# Patient Record
Sex: FEMALE | Race: WHITE | NOT HISPANIC OR LATINO | Employment: OTHER | URBAN - METROPOLITAN AREA
[De-identification: names, ages, dates, MRNs, and addresses within clinical notes are randomized per-mention and may not be internally consistent; named-entity substitution may affect disease eponyms.]

---

## 2017-01-04 ENCOUNTER — TELEPHONE (OUTPATIENT)
Dept: BARIATRICS | Facility: CLINIC | Age: 72
End: 2017-01-04

## 2017-01-04 ENCOUNTER — PATIENT MESSAGE (OUTPATIENT)
Dept: BARIATRICS | Facility: CLINIC | Age: 72
End: 2017-01-04

## 2017-01-04 NOTE — TELEPHONE ENCOUNTER
Pt called back and states she has a hiatal hernia.  Says she would like to have her sleeve sx done on the [facility].      Pt opted to do online finn. She requested instructions be sent via my [facility].

## 2017-01-04 NOTE — TELEPHONE ENCOUNTER
----- Message from Francokiera Edmond sent at 1/4/2017  9:27 AM CST -----  [phone]/pt states that she needs to speak with someone in ref to seeing if she can have her hernia repaired at the same time as a sleeve procedure/please call//thank you

## 2017-01-04 NOTE — TELEPHONE ENCOUNTER
Called pt back but no answer.  LVM stating she can have hernia taken out depending on what kind of hernia she has and if she can wait for the sleeve to have it repaired at once. Advised if she is having severe pain, she must take out first then sleeve later.  Requested she call me back to discuss what kind of hernia she has.

## 2017-01-13 ENCOUNTER — LAB VISIT (OUTPATIENT)
Dept: LAB | Facility: HOSPITAL | Age: 72
End: 2017-01-13
Attending: INTERNAL MEDICINE
Payer: MEDICARE

## 2017-01-13 ENCOUNTER — OFFICE VISIT (OUTPATIENT)
Dept: FAMILY MEDICINE | Facility: CLINIC | Age: 72
End: 2017-01-13
Payer: MEDICARE

## 2017-01-13 ENCOUNTER — TELEPHONE (OUTPATIENT)
Dept: FAMILY MEDICINE | Facility: CLINIC | Age: 72
End: 2017-01-13

## 2017-01-13 VITALS
SYSTOLIC BLOOD PRESSURE: 160 MMHG | WEIGHT: 247.94 LBS | TEMPERATURE: 98 F | BODY MASS INDEX: 37.58 KG/M2 | DIASTOLIC BLOOD PRESSURE: 90 MMHG | HEART RATE: 86 BPM | HEIGHT: 68 IN | RESPIRATION RATE: 17 BRPM | OXYGEN SATURATION: 97 %

## 2017-01-13 DIAGNOSIS — M27.69 FAILING DENTAL IMPLANT: ICD-10-CM

## 2017-01-13 DIAGNOSIS — N18.30 CKD (CHRONIC KIDNEY DISEASE), STAGE III: ICD-10-CM

## 2017-01-13 DIAGNOSIS — E66.9 OBESITY (BMI 30-39.9): ICD-10-CM

## 2017-01-13 DIAGNOSIS — N25.81 SECONDARY HYPERPARATHYROIDISM: ICD-10-CM

## 2017-01-13 DIAGNOSIS — I10 ESSENTIAL HYPERTENSION: Primary | ICD-10-CM

## 2017-01-13 DIAGNOSIS — Z23 NEED FOR INFLUENZA VACCINATION: ICD-10-CM

## 2017-01-13 DIAGNOSIS — M27.69 FAILING DENTAL IMPLANT: Primary | ICD-10-CM

## 2017-01-13 DIAGNOSIS — Z13.6 SCREENING FOR CARDIOVASCULAR CONDITION: ICD-10-CM

## 2017-01-13 LAB
25(OH)D3+25(OH)D2 SERPL-MCNC: 15 NG/ML
ALBUMIN SERPL BCP-MCNC: 3.6 G/DL
ANION GAP SERPL CALC-SCNC: 8 MMOL/L
BUN SERPL-MCNC: 16 MG/DL
CALCIUM SERPL-MCNC: 9.1 MG/DL
CHLORIDE SERPL-SCNC: 109 MMOL/L
CHOLEST/HDLC SERPL: 5.1 {RATIO}
CO2 SERPL-SCNC: 23 MMOL/L
CREAT SERPL-MCNC: 1.2 MG/DL
EST. GFR  (AFRICAN AMERICAN): 52.5 ML/MIN/1.73 M^2
EST. GFR  (NON AFRICAN AMERICAN): 45.6 ML/MIN/1.73 M^2
GLUCOSE SERPL-MCNC: 90 MG/DL
HDL/CHOLESTEROL RATIO: 19.5 %
HDLC SERPL-MCNC: 159 MG/DL
HDLC SERPL-MCNC: 31 MG/DL
LDLC SERPL CALC-MCNC: 105.4 MG/DL
NONHDLC SERPL-MCNC: 128 MG/DL
PHOSPHATE SERPL-MCNC: 3.7 MG/DL
POTASSIUM SERPL-SCNC: 4.6 MMOL/L
PTH-INTACT SERPL-MCNC: 160 PG/ML
SODIUM SERPL-SCNC: 140 MMOL/L
TRIGL SERPL-MCNC: 113 MG/DL

## 2017-01-13 PROCEDURE — 99999 PR PBB SHADOW E&M-EST. PATIENT-LVL III: CPT | Mod: PBBFAC,,, | Performed by: FAMILY MEDICINE

## 2017-01-13 PROCEDURE — 83970 ASSAY OF PARATHORMONE: CPT

## 2017-01-13 PROCEDURE — 36415 COLL VENOUS BLD VENIPUNCTURE: CPT | Mod: PO

## 2017-01-13 PROCEDURE — 99214 OFFICE O/P EST MOD 30 MIN: CPT | Mod: S$PBB,,, | Performed by: FAMILY MEDICINE

## 2017-01-13 PROCEDURE — 82306 VITAMIN D 25 HYDROXY: CPT

## 2017-01-13 PROCEDURE — 80061 LIPID PANEL: CPT

## 2017-01-13 PROCEDURE — 80069 RENAL FUNCTION PANEL: CPT

## 2017-01-13 NOTE — TELEPHONE ENCOUNTER
----- Message from Ayana Cervantes sent at 1/13/2017 11:41 AM CST -----  Pt requesting new referral to dr Marco Smith [address] [place] [address] [address] [place] [address], [place] Tel# [phone] Fax [phone]. Pt states dr hancock is no longer on [facility]. Please fax referral to his office.

## 2017-01-13 NOTE — PROGRESS NOTES
Chief Complaint   Patient presents with    Anxiety     1 month follow up , labs results discuss     Hypertension       Luisana Chun is a 71 y.o. female who presents per the Chief Complaint.  Pt is known to me and was last seen by me on 12/15/2016.  All known chronic medical issues have been documented.       Hypertension   This is a chronic problem. The current episode started more than 1 year ago. The problem is unchanged. The problem is uncontrolled. Associated symptoms include anxiety. Pertinent negatives include no blurred vision, chest pain, headaches, malaise/fatigue, neck pain, orthopnea, palpitations, peripheral edema, PND, shortness of breath or sweats. There are no associated agents to hypertension. Risk factors for coronary artery disease include obesity, post-menopausal state and sedentary lifestyle. Past treatments include angiotensin blockers and diuretics. The current treatment provides moderate improvement. Compliance problems include diet and exercise.  Hypertensive end-organ damage includes kidney disease. There is no history of angina, CAD/MI, CVA, heart failure, left ventricular hypertrophy, PVD, renovascular disease, retinopathy or a thyroid problem. Identifiable causes of hypertension include chronic renal disease. There is no history of coarctation of the aorta, hyperaldosteronism, hypercortisolism, hyperparathyroidism, a hypertension causing med, pheochromocytoma or sleep apnea.        ROS  Review of Systems   Constitutional: Negative.  Negative for activity change, appetite change, chills, diaphoresis, fatigue, fever, malaise/fatigue and unexpected weight change.   HENT: Negative.  Negative for congestion, ear pain, hearing loss, nosebleeds, postnasal drip, rhinorrhea, sinus pressure, sneezing, sore throat and trouble swallowing.    Eyes: Negative for blurred vision, pain and visual disturbance.   Respiratory: Negative for cough, choking and shortness of breath.    Cardiovascular:  "Negative for chest pain, palpitations, orthopnea, leg swelling and PND.   Gastrointestinal: Negative for abdominal pain, constipation, diarrhea, nausea and vomiting.   Genitourinary: Negative for difficulty urinating, dysuria, frequency and urgency.   Musculoskeletal: Positive for arthralgias (right shoulder) and myalgias. Negative for back pain, gait problem, joint swelling, neck pain and neck stiffness.   Skin: Negative.    Allergic/Immunologic: Negative for environmental allergies and food allergies.   Neurological: Negative.  Negative for dizziness, seizures, syncope, weakness, light-headedness and headaches.   Psychiatric/Behavioral: Negative.  Negative for confusion, decreased concentration, dysphoric mood and sleep disturbance. The patient is not nervous/anxious.        Physical Exam  Vitals:    01/13/17 0959   BP: (!) 160/90   Pulse: 86   Resp: 17   Temp: 98 °F (36.7 °C)    Body mass index is 37.69 kg/(m^2).  Weight: 112.5 kg (247 lb 14.5 oz)   Height: 5' 8" (172.7 cm)     Physical Exam   Constitutional: She is oriented to person, place, and time. She appears well-developed and well-nourished. She is active and cooperative.  Non-toxic appearance. She does not have a sickly appearance. She does not appear ill. No distress.   HENT:   Head: Normocephalic and atraumatic.   Right Ear: Hearing, tympanic membrane, external ear and ear canal normal. No tenderness. No foreign bodies. Tympanic membrane is not injected, not scarred, not perforated, not erythematous, not retracted and not bulging. No decreased hearing is noted.   Left Ear: Hearing, tympanic membrane, external ear and ear canal normal. No tenderness. No foreign bodies. Tympanic membrane is not injected, not scarred, not perforated, not erythematous, not retracted and not bulging. No decreased hearing is noted.   Nose: Nose normal. No rhinorrhea or nasal deformity.   Mouth/Throat: Uvula is midline, oropharynx is clear and moist and mucous membranes are " normal. She does not have dentures. No dental caries.   Eyes: Conjunctivae, EOM and lids are normal. Pupils are equal, round, and reactive to light. Right eye exhibits no chemosis, no discharge and no exudate. No foreign body present in the right eye. Left eye exhibits no chemosis, no discharge and no exudate. No foreign body present in the left eye. No scleral icterus.   Neck: Normal range of motion and full passive range of motion without pain. Neck supple. No thyroid mass and no thyromegaly present.   Cardiovascular: Normal rate, regular rhythm, S1 normal, S2 normal and normal heart sounds.  Exam reveals no gallop and no friction rub.    No murmur heard.  Pulmonary/Chest: Effort normal. She has no decreased breath sounds. She has no wheezes. She has no rhonchi. She has no rales. She exhibits no mass, no tenderness and no deformity.   Abdominal: Soft. Normal appearance and bowel sounds are normal. She exhibits no distension, no ascites and no mass. There is no hepatosplenomegaly. There is no tenderness. There is no rigidity, no rebound and no guarding.   Musculoskeletal:        Right shoulder: She exhibits decreased range of motion, tenderness, bony tenderness, pain and decreased strength. She exhibits no swelling, no effusion, no crepitus, no deformity, no laceration, no spasm and normal pulse.        Right knee: She exhibits normal range of motion, no swelling, no effusion, no ecchymosis, no deformity, no laceration, no erythema, normal alignment, no LCL laxity, normal patellar mobility, no bony tenderness, normal meniscus and no MCL laxity. Tenderness found. Medial joint line and patellar tendon tenderness noted. No MCL and no LCL tenderness noted.        Left knee: She exhibits bony tenderness. She exhibits normal range of motion, no swelling, no effusion, no ecchymosis, no deformity, no laceration, no erythema, normal alignment, no LCL laxity, normal patellar mobility, normal meniscus and no MCL laxity.  Tenderness found. No medial joint line, no lateral joint line, no MCL, no LCL and no patellar tendon tenderness noted.   Lymphadenopathy:        Head (right side): No submental, no submandibular, no tonsillar, no preauricular and no posterior auricular adenopathy present.        Head (left side): No submental, no submandibular, no tonsillar, no preauricular and no posterior auricular adenopathy present.     She has no cervical adenopathy.   Neurological: She is alert and oriented to person, place, and time. She has normal strength. No cranial nerve deficit or sensory deficit. She exhibits normal muscle tone. She displays no seizure activity.   Skin: Skin is warm, dry and intact. No rash noted. She is not diaphoretic. No pallor.   Psychiatric: She has a normal mood and affect. Her speech is normal and behavior is normal. Judgment and thought content normal. Cognition and memory are normal. She is attentive.   Vitals reviewed.      Assessment & Plan    1. Essential hypertension  Patient was counseled and encouraged to maintain a low sodium diet, as well as increasing physical activity.  Recommend random BP checks at home on a regular basis.  Will continue medication at this time, and follow up as recommended, or sooner if blood pressure is not well controlled.     2. Secondary hyperparathyroidism  Managed by Endocrinology; blood test will be drawn today and managed accordingly.    3. CKD (chronic kidney disease), stage III  Stable; encouraged patient to avoid NSAID medications and to increase water and clear liquid hydration.  Will continue to monitor for decline and refer as necessary.     4. Obesity (BMI 30-39.9)  We discussed weight and safe, effective ways of losing pounds, including low carbohydrate, low fat diet and increasing physical activity to improve cardiovascular performance and increase metabolism.  Patient was encouraged to set realistic attainable goals for weight loss, and we will follow up  periodically.  Interested in [employer] Study.    5. Failing dental implant  Refer for evaluation and management.   - Ambulatory consult to Oral Maxillofacial Surgery    6. Need for influenza vaccination  Received at outside pharmacy.    Follow up documented    ACTIVE MEDICAL ISSUES:  Documented in Problem List    PAST MEDICAL HISTORY  Documented    PAST SURGICAL HISTORY:  Documented    SOCIAL HISTORY:  Documented    FAMILY HISTORY:  Documented    ALLERGIES AND MEDICATIONS: updated and reviewed.  Documented    Health Maintenance       Date Due Completion Date    Lipid Panel 1945 ---    TETANUS VACCINE 9/16/1963 ---    Colonoscopy 9/16/1995 ---    Zoster Vaccine 9/16/2005 ---    Influenza Vaccine 8/1/2016 ---    Pneumococcal (65+) (1 of 2 - PCV13) 6/23/2017 (Originally 9/16/2010) ---    Mammogram 2/17/2018 2/17/2016    DEXA SCAN 2/16/2019 2/16/2016

## 2017-01-19 ENCOUNTER — TELEPHONE (OUTPATIENT)
Dept: FAMILY MEDICINE | Facility: CLINIC | Age: 72
End: 2017-01-19

## 2017-01-19 NOTE — TELEPHONE ENCOUNTER
Patient informed, per Dr. Lombard, that she should be fine taking the medication Cozaar, just do not add any potassium supplements to regimen.  Patient verbalized understanding, no questions noted.

## 2017-01-19 NOTE — TELEPHONE ENCOUNTER
----- Message from Aparna Austin sent at 1/19/2017 10:16 AM CST -----  Contact: self  Pt calling to discuss being on medication of losartan (COZAAR) 100 MG tablet. Pt states this medication contains potassium an she is not supposed to have that. Please call pt to discuss to [phone].

## 2017-01-31 ENCOUNTER — TELEPHONE (OUTPATIENT)
Dept: FAMILY MEDICINE | Facility: CLINIC | Age: 72
End: 2017-01-31

## 2017-01-31 NOTE — TELEPHONE ENCOUNTER
----- Message from Karely Treviño sent at 1/31/2017 11:15 AM CST -----  Contact: self  Patient states the Maxillo- facial specialist she was referred to does not accept Medicare . Can she be referred to someone else ? I suggested she may have to call her insurance company to find someone that accepts Medicare .   Please advise .      [phone]   LL

## 2017-01-31 NOTE — TELEPHONE ENCOUNTER
----- Message from Tamar Brito sent at 1/31/2017 10:56 AM CST -----  Contact: self  Please call pt in regards to referral for Maxillo-facial. Pt states that original referral, provider is no longer available. [phone]          Thanks

## 2017-01-31 NOTE — TELEPHONE ENCOUNTER
Pt states [facility] is no longer available; I informed her referral was placed for Dr Marco Smith; pt states she hasn't heard from anyone to schedule appointment; I gave pt phone number to office and also faxed referral to office per pt request

## 2017-02-10 ENCOUNTER — OFFICE VISIT (OUTPATIENT)
Dept: FAMILY MEDICINE | Facility: CLINIC | Age: 72
End: 2017-02-10
Payer: MEDICARE

## 2017-02-10 VITALS
HEART RATE: 80 BPM | TEMPERATURE: 99 F | DIASTOLIC BLOOD PRESSURE: 78 MMHG | RESPIRATION RATE: 16 BRPM | WEIGHT: 246.94 LBS | BODY MASS INDEX: 37.43 KG/M2 | OXYGEN SATURATION: 99 % | SYSTOLIC BLOOD PRESSURE: 146 MMHG | HEIGHT: 68 IN

## 2017-02-10 DIAGNOSIS — R19.7 DIARRHEA FOLLOWING GASTROINTESTINAL SURGERY: ICD-10-CM

## 2017-02-10 DIAGNOSIS — Z98.890 DIARRHEA FOLLOWING GASTROINTESTINAL SURGERY: ICD-10-CM

## 2017-02-10 DIAGNOSIS — I10 ESSENTIAL HYPERTENSION: Primary | ICD-10-CM

## 2017-02-10 PROCEDURE — 99214 OFFICE O/P EST MOD 30 MIN: CPT | Mod: S$PBB,,, | Performed by: FAMILY MEDICINE

## 2017-02-10 PROCEDURE — 99213 OFFICE O/P EST LOW 20 MIN: CPT | Mod: PBBFAC,PO | Performed by: FAMILY MEDICINE

## 2017-02-10 PROCEDURE — 99999 PR PBB SHADOW E&M-EST. PATIENT-LVL III: CPT | Mod: PBBFAC,,, | Performed by: FAMILY MEDICINE

## 2017-02-10 RX ORDER — DIPHENOXYLATE HYDROCHLORIDE AND ATROPINE SULFATE 2.5; .025 MG/1; MG/1
2 TABLET ORAL 3 TIMES DAILY PRN
Qty: 180 TABLET | Refills: 0 | Status: SHIPPED | OUTPATIENT
Start: 2017-02-10 | End: 2017-04-22 | Stop reason: SDUPTHER

## 2017-02-10 NOTE — PROGRESS NOTES
Chief Complaint   Patient presents with    Follow-up    lab results     kidney labs     discuss results for lab to check liver     ALP level    would like to discuss possible bacteria in system     toilet has black bacteria stains    infection in large intestine/colon     informed by Dr. Pham Larios     hurt both knees    medication request     would like to have a script for ambeva Lieberman Bessy Chun is a 71 y.o. female who presents per the Chief Complaint.  Pt is known to me and was last seen by me on 1/13/2017.  All known chronic medical issues have been documented.       Diarrhea    This is a chronic problem. The current episode started more than 1 year ago. The problem occurs 5 to 10 times per day. The problem has been unchanged. The stool consistency is described as watery. The patient states that diarrhea awakens her from sleep. Associated symptoms include abdominal pain. Pertinent negatives include no arthralgias, bloating, chills, coughing, fever, headaches, increased  flatus, myalgias, sweats, URI, vomiting or weight loss. Nothing aggravates the symptoms. She has tried anti-motility drug for the symptoms. 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 There is no history of inflammatory bowel disease, irritable bowel syndrome, a recent abdominal surgery or short gut syndrome.   Hypertension   This is a chronic problem. The current episode started more than 1 year ago. The problem is unchanged. The problem is uncontrolled. Associated symptoms include anxiety. Pertinent negatives include no blurred vision, chest pain, headaches, malaise/fatigue, neck pain, orthopnea, palpitations, peripheral edema, PND, shortness of breath or sweats. There are no associated agents to hypertension. Risk factors for coronary artery disease include obesity, post-menopausal state and sedentary lifestyle.  Past treatments include angiotensin blockers and diuretics. The current treatment provides moderate improvement. Compliance problems include diet and exercise.  Hypertensive end-organ damage includes kidney disease. There is no history of angina, CAD/MI, CVA, heart failure, left ventricular hypertrophy, PVD, renovascular disease, retinopathy or a thyroid problem. Identifiable causes of hypertension include chronic renal disease. There is no history of coarctation of the aorta, hyperaldosteronism, hypercortisolism, hyperparathyroidism, a hypertension causing med, pheochromocytoma or sleep apnea.        ROS  Review of Systems   Constitutional: Negative.  Negative for activity change, appetite change, chills, diaphoresis, fatigue, fever, malaise/fatigue, unexpected weight change and weight loss.   HENT: Negative.  Negative for congestion, ear pain, hearing loss, nosebleeds, postnasal drip, rhinorrhea, sinus pressure, sneezing, sore throat and trouble swallowing.    Eyes: Negative for blurred vision, pain and visual disturbance.   Respiratory: Negative for cough, choking and shortness of breath.    Cardiovascular: Positive for leg swelling. Negative for chest pain, palpitations, orthopnea and PND.   Gastrointestinal: Positive for abdominal pain and diarrhea. Negative for bloating, constipation, flatus, nausea and vomiting.   Genitourinary: Negative for difficulty urinating, dysuria, frequency and urgency.   Musculoskeletal: Negative.  Negative for arthralgias, back pain, gait problem, joint swelling, myalgias and neck pain.   Skin: Negative.    Allergic/Immunologic: Negative for environmental allergies and food allergies.   Neurological: Negative.  Negative for dizziness, seizures, syncope, weakness, light-headedness and headaches.   Psychiatric/Behavioral: Negative.  Negative for confusion, decreased concentration, dysphoric mood and sleep disturbance. The patient is not nervous/anxious.        Physical Exam  Vitals:  "   02/10/17 1428   BP: (!) 146/78   Pulse: 80   Resp: 16   Temp: 98.6 °F (37 °C)    Body mass index is 37.54 kg/(m^2).  Weight: 112 kg (246 lb 14.6 oz)   Height: 5' 8" (172.7 cm)     Physical Exam   Constitutional: She is oriented to person, place, and time. She appears well-developed and well-nourished. She is active and cooperative.  Non-toxic appearance. She does not have a sickly appearance. She does not appear ill. No distress.   HENT:   Head: Normocephalic and atraumatic.   Right Ear: Hearing, tympanic membrane, external ear and ear canal normal. No tenderness. No foreign bodies. Tympanic membrane is not injected, not scarred, not perforated, not erythematous, not retracted and not bulging. No decreased hearing is noted.   Left Ear: Hearing, tympanic membrane, external ear and ear canal normal. No tenderness. No foreign bodies. Tympanic membrane is not injected, not scarred, not perforated, not erythematous, not retracted and not bulging. No decreased hearing is noted.   Nose: Nose normal. No rhinorrhea or nasal deformity.   Mouth/Throat: Uvula is midline, oropharynx is clear and moist and mucous membranes are normal. She does not have dentures. Normal dentition. No dental caries.   Eyes: Conjunctivae, EOM and lids are normal. Pupils are equal, round, and reactive to light. Right eye exhibits no chemosis, no discharge and no exudate. No foreign body present in the right eye. Left eye exhibits no chemosis, no discharge and no exudate. No foreign body present in the left eye. No scleral icterus.   Neck: Normal range of motion and full passive range of motion without pain. Neck supple. No thyroid mass and no thyromegaly present.   Cardiovascular: Normal rate, regular rhythm, S1 normal, S2 normal and normal heart sounds.  Exam reveals no gallop and no friction rub.    No murmur heard.  Pulmonary/Chest: Effort normal and breath sounds normal. No accessory muscle usage. No respiratory distress. She has no decreased " breath sounds. She has no wheezes. She has no rhonchi. She has no rales. She exhibits no mass, no tenderness and no deformity.   Abdominal: Soft. Normal appearance and bowel sounds are normal. She exhibits no distension, no ascites and no mass. There is no hepatosplenomegaly. There is no tenderness. There is no rigidity, no rebound and no guarding.   Musculoskeletal:        Right shoulder: She exhibits decreased range of motion, tenderness, bony tenderness, pain and decreased strength. She exhibits no swelling, no effusion, no crepitus, no deformity, no laceration, no spasm and normal pulse.        Right knee: She exhibits normal range of motion, no swelling, no effusion, no ecchymosis, no deformity, no laceration, no erythema, normal alignment, no LCL laxity, normal patellar mobility, no bony tenderness, normal meniscus and no MCL laxity. Tenderness found. Medial joint line and patellar tendon tenderness noted. No MCL and no LCL tenderness noted.        Left knee: She exhibits bony tenderness. She exhibits normal range of motion, no swelling, no effusion, no ecchymosis, no deformity, no laceration, no erythema, normal alignment, no LCL laxity, normal patellar mobility, normal meniscus and no MCL laxity. Tenderness found. No medial joint line, no lateral joint line, no MCL, no LCL and no patellar tendon tenderness noted.   Lymphadenopathy:        Head (right side): No submental, no submandibular, no tonsillar, no preauricular and no posterior auricular adenopathy present.        Head (left side): No submental, no submandibular, no tonsillar, no preauricular and no posterior auricular adenopathy present.     She has no cervical adenopathy.   Neurological: She is alert and oriented to person, place, and time. She has normal strength. No cranial nerve deficit or sensory deficit. She exhibits normal muscle tone. She displays no seizure activity. Coordination and gait normal.   Skin: Skin is warm, dry and intact. No rash  noted. She is not diaphoretic. No pallor.   Psychiatric: She has a normal mood and affect. Her speech is normal and behavior is normal. Judgment and thought content normal. Cognition and memory are normal. She is attentive.   Vitals reviewed.      Assessment & Plan    1. Essential hypertension  Patient was counseled and encouraged to maintain a low sodium diet, as well as increasing physical activity.  Recommend random BP checks at home on a regular basis.  Will continue medication at this time, and follow up as recommended, or sooner if blood pressure is not well controlled.     2. Diarrhea following gastrointestinal surgery  Refer for evaluation and management.  The current medical regimen is effective at this time and no changes to present plan or medications will be made at this visit.     - Ambulatory referral to Gastroenterology  - diphenoxylate-atropine 2.5-0.025 mg (LOMOTIL) 2.5-0.025 mg per tablet; Take 2 tablets by mouth 3 (three) times daily as needed.  Dispense: 180 tablet; Refill: 0    Follow up documented    ACTIVE MEDICAL ISSUES:  Documented in Problem List    PAST MEDICAL HISTORY  Documented    PAST SURGICAL HISTORY:  Documented    SOCIAL HISTORY:  Documented    FAMILY HISTORY:  Documented    ALLERGIES AND MEDICATIONS: updated and reviewed.  Documented    Health Maintenance       Date Due Completion Date    TETANUS VACCINE 9/16/1963 ---    Colonoscopy 9/16/1995 ---    Zoster Vaccine 9/16/2005 ---    Influenza Vaccine 8/1/2016 ---    Pneumococcal (65+) (1 of 2 - PCV13) 6/23/2017 (Originally 9/16/2010) ---    Mammogram 2/17/2018 2/17/2016    DEXA SCAN 2/16/2019 2/16/2016    Lipid Panel 1/13/2022 1/13/2017

## 2017-02-27 NOTE — TELEPHONE ENCOUNTER
[facility] requesting refill for patient  - furosemide (LASIX) 20 MG tablet

## 2017-03-01 DIAGNOSIS — N18.30 CKD (CHRONIC KIDNEY DISEASE), STAGE III: ICD-10-CM

## 2017-03-01 DIAGNOSIS — F32.1 MAJOR DEPRESSIVE DISORDER, SINGLE EPISODE, MODERATE: ICD-10-CM

## 2017-03-01 DIAGNOSIS — I10 ESSENTIAL HYPERTENSION: ICD-10-CM

## 2017-03-01 RX ORDER — TRAZODONE HYDROCHLORIDE 50 MG/1
50 TABLET ORAL NIGHTLY
Refills: 2 | COMMUNITY
Start: 2017-01-19 | End: 2017-03-01 | Stop reason: SDUPTHER

## 2017-03-01 RX ORDER — FUROSEMIDE 20 MG/1
20 TABLET ORAL 2 TIMES DAILY
Qty: 30 TABLET | Refills: 11 | Status: SHIPPED | OUTPATIENT
Start: 2017-03-01 | End: 2017-03-01 | Stop reason: SDUPTHER

## 2017-03-01 RX ORDER — FLUOXETINE HYDROCHLORIDE 40 MG/1
40 CAPSULE ORAL EVERY MORNING
Qty: 90 CAPSULE | Refills: 3 | Status: SHIPPED | OUTPATIENT
Start: 2017-03-01 | End: 2017-12-15 | Stop reason: SDUPTHER

## 2017-03-01 RX ORDER — CALCITRIOL 0.25 UG/1
0.25 CAPSULE ORAL DAILY
Qty: 90 CAPSULE | Refills: 2 | Status: SHIPPED | OUTPATIENT
Start: 2017-03-01 | End: 2017-05-04

## 2017-03-01 RX ORDER — LOSARTAN POTASSIUM 100 MG/1
100 TABLET ORAL DAILY
Qty: 90 TABLET | Refills: 2 | Status: SHIPPED | OUTPATIENT
Start: 2017-03-01 | End: 2017-05-12

## 2017-03-01 RX ORDER — ATENOLOL 50 MG/1
50 TABLET ORAL DAILY
Refills: 2 | COMMUNITY
Start: 2017-01-19 | End: 2017-03-23

## 2017-03-01 RX ORDER — FUROSEMIDE 20 MG/1
20 TABLET ORAL 2 TIMES DAILY
Qty: 30 TABLET | Refills: 11 | Status: SHIPPED | OUTPATIENT
Start: 2017-03-01 | End: 2017-05-12

## 2017-03-01 RX ORDER — TRAZODONE HYDROCHLORIDE 50 MG/1
50 TABLET ORAL NIGHTLY
Qty: 90 TABLET | Refills: 2 | Status: SHIPPED | OUTPATIENT
Start: 2017-03-01 | End: 2017-08-16

## 2017-03-01 NOTE — TELEPHONE ENCOUNTER
Last OV was 02/10/2017 , last refill was last year different days 11/07/2016 , 12/15/2016 , 12/06/2016 this time was request to sent to [facility]

## 2017-03-01 NOTE — TELEPHONE ENCOUNTER
----- Message from Tamar Brito sent at 3/1/2017 11:29 AM CST -----  Contact: [facility]  Pt needs new scripts for followling meds: Please send to [facility] fax # [phone] Please call pt to confirm also [facility] can be reached at @ [phone]    furosemide (LASIX) 20 MG tablet  calcitRIOL (ROCALTROL) 0.25 MCG Cap  fluoxetine (PROZAC) 40 MG capsule  Trazodone 50 mg  losartan (COZAAR) 100 MG tablet

## 2017-03-06 ENCOUNTER — OFFICE VISIT (OUTPATIENT)
Dept: OPTOMETRY | Facility: CLINIC | Age: 72
End: 2017-03-06
Payer: MEDICARE

## 2017-03-06 DIAGNOSIS — H43.813 POSTERIOR VITREOUS DETACHMENT, BILATERAL: Primary | ICD-10-CM

## 2017-03-06 DIAGNOSIS — H52.7 REFRACTIVE ERROR: ICD-10-CM

## 2017-03-06 DIAGNOSIS — H25.13 NUCLEAR SCLEROSIS, BILATERAL: ICD-10-CM

## 2017-03-06 DIAGNOSIS — Z13.5 SCREENING FOR GLAUCOMA: ICD-10-CM

## 2017-03-06 PROCEDURE — 99999 PR PBB SHADOW E&M-EST. PATIENT-LVL I: CPT | Mod: PBBFAC,,, | Performed by: OPTOMETRIST

## 2017-03-06 PROCEDURE — 92015 DETERMINE REFRACTIVE STATE: CPT | Mod: ,,, | Performed by: OPTOMETRIST

## 2017-03-06 PROCEDURE — 92004 COMPRE OPH EXAM NEW PT 1/>: CPT | Mod: S$PBB,,, | Performed by: OPTOMETRIST

## 2017-03-06 PROCEDURE — 99211 OFF/OP EST MAY X REQ PHY/QHP: CPT | Mod: PBBFAC,PO | Performed by: OPTOMETRIST

## 2017-03-06 NOTE — PROGRESS NOTES
"HPI     Dls:  1-2 yrs     Pt here for hypertensive eye exam.   Pt states x 3 weeks when first waking up has a large black spot in center   of vision ou last for about 30 min. Then clears up. Pt states on occasion   notice the room has a tint of green and pink. Pt c/o blurry vision at   distance and near ou. Pt wears otc readers. Pt c/o tearing ou no itching   no burning no pain ha's.     Eye meds:   None    Family OHx    (+) glaucoma - brothers, father  (+) AMD - father, mother, siblings            Last edited by Melly Ralph, OD on 3/6/2017  2:39 PM.       Assessment /Plan     For exam results, see Encounter Report.    Posterior vitreous detachment, bilateral  - Discussed causes of flashes and floaters with the patient and described the warnings of a possible retinal detachment. Advised patient to call if there is an increase in flashes or floaters    Nuclear sclerosis, bilateral  - Educated patient on the presence of cataracts and effects on vision, including clouded, blurred or dim vision, increasing difficulty with vision at night, sensitivity to light and glare, need for brighter light for reading and other activities, and seeing "halos" around lights. No surgery at this time. Recheck in one year.    Screening for glaucoma  - No changes related to glaucoma. Monitor yearly.    Refractive error  - Educated patient on refractive error and discussed lens options. Gave pt Rx for specs. RTC in 1 year.    Eyeglass Final Rx     Eyeglass Final Rx      Sphere Cylinder Axis Add   Right +0.50 +0.25 170 +2.50   Left +0.25 Sphere  +2.50       Expiration Date:  3/7/2018                RTC 1 year(s) or sooner PRN                   "

## 2017-03-20 ENCOUNTER — TELEPHONE (OUTPATIENT)
Dept: FAMILY MEDICINE | Facility: CLINIC | Age: 72
End: 2017-03-20

## 2017-03-20 NOTE — TELEPHONE ENCOUNTER
----- Message from Nicole Castro sent at 3/20/2017  9:25 AM CDT -----  Contact: self  Pt is requesting a call back concerning lab results from [facility]. Pt would like to disuss medication [phone]

## 2017-03-23 ENCOUNTER — TELEPHONE (OUTPATIENT)
Dept: ENDOCRINOLOGY | Facility: CLINIC | Age: 72
End: 2017-03-23

## 2017-03-23 ENCOUNTER — OFFICE VISIT (OUTPATIENT)
Dept: ENDOCRINOLOGY | Facility: CLINIC | Age: 72
End: 2017-03-23
Payer: MEDICARE

## 2017-03-23 VITALS
SYSTOLIC BLOOD PRESSURE: 164 MMHG | HEART RATE: 78 BPM | WEIGHT: 247.81 LBS | HEIGHT: 68 IN | BODY MASS INDEX: 37.56 KG/M2 | DIASTOLIC BLOOD PRESSURE: 82 MMHG

## 2017-03-23 DIAGNOSIS — E66.9 OBESITY, CLASS II, BMI 35-39.9: ICD-10-CM

## 2017-03-23 DIAGNOSIS — I10 ESSENTIAL HYPERTENSION: ICD-10-CM

## 2017-03-23 DIAGNOSIS — N25.81 SECONDARY HYPERPARATHYROIDISM: ICD-10-CM

## 2017-03-23 DIAGNOSIS — E55.9 VITAMIN D DEFICIENCY: ICD-10-CM

## 2017-03-23 DIAGNOSIS — M81.0 OSTEOPOROSIS WITHOUT PATHOLOGICAL FRACTURE: Primary | ICD-10-CM

## 2017-03-23 PROCEDURE — 99999 PR PBB SHADOW E&M-EST. PATIENT-LVL IV: CPT | Mod: PBBFAC,GC,, | Performed by: INTERNAL MEDICINE

## 2017-03-23 PROCEDURE — 99204 OFFICE O/P NEW MOD 45 MIN: CPT | Mod: S$PBB,GC,, | Performed by: INTERNAL MEDICINE

## 2017-03-23 PROCEDURE — 99214 OFFICE O/P EST MOD 30 MIN: CPT | Mod: PBBFAC | Performed by: INTERNAL MEDICINE

## 2017-03-23 RX ORDER — ERGOCALCIFEROL 1.25 MG/1
50000 CAPSULE ORAL DAILY
Qty: 5 CAPSULE | Refills: 0 | Status: SHIPPED | OUTPATIENT
Start: 2017-03-23 | End: 2018-05-16 | Stop reason: SDUPTHER

## 2017-03-23 NOTE — PROGRESS NOTES
I  Martin Ochoa have personally taken the history and examined this patient and agree with the fellow's note.

## 2017-03-23 NOTE — MR AVS SNAPSHOT
[facility]  [address]  [facility] [address]  Phone: [phone]  Fax: [phone]                  Luisana Chun   3/23/2017 9:00 AM   Office Visit    Description:  Female : 1945   Provider:  Gurvinder Hendrix MD   Department:  [facility]           Reason for Visit     Osteoporosis           Diagnoses this Visit        Comments    Osteoporosis without pathological fracture    -  Primary     Essential hypertension         Obesity, Class II, BMI 35-39.9         Secondary hyperparathyroidism         Vitamin D deficiency                To Do List           Future Appointments        Provider Department Dept Phone    3/24/2017 2:40 PM Azikiwe K. Lombard, MD [facility] [phone]    2017 4:00 PM Azikiwe K. Lombard, MD [facility] [phone]    2017 10:45 AM SPECIMEN, [facility] [phone]    2017 11:00 AM LAB, [facility] [phone]      Goals (5 Years of Data)     None      Follow-Up and Disposition     Return in about 1 year (around 3/23/2018).       These Medications        Disp Refills Start End    ergocalciferol (ERGOCALCIFEROL) 50,000 unit Cap 5 capsule 0 3/23/2017     Take 1 capsule (50,000 Units total) by mouth once daily. - Oral    Pharmacy: [address] Ph #: [phone]         [facility]     [facility] -  Assistance  Registered nurses in the [facility] provide clinical advisement, health education, appointment booking, and other advisory services.  Call for this free service at [phone].             Medications           Message regarding Medications     Verify the changes and/or additions to your medication regime listed below are the same as discussed with your clinician today.  If any of these changes or additions are incorrect, please  notify your healthcare provider.        START taking these NEW medications        Refills    ergocalciferol (ERGOCALCIFEROL) 50,000 unit Cap 0    Sig: Take 1 capsule (50,000 Units total) by mouth once daily.    Class: Normal    Route: Oral      STOP taking these medications     atenolol (TENORMIN) 50 MG tablet Take 50 mg by mouth once daily.           Verify that the below list of medications is an accurate representation of the medications you are currently taking.  If none reported, the list may be blank. If incorrect, please contact your healthcare provider. Carry this list with you in case of emergency.           Current Medications     calcitRIOL (ROCALTROL) 0.25 MCG Cap Take 1 capsule (0.25 mcg total) by mouth once daily.    CHONDROITIN SULFATE A (CHONDROITIN SULFATE ORAL) Take by mouth.    clonazePAM (KLONOPIN) 1 MG tablet Take 1 tablet (1 mg total) by mouth nightly as needed.    estradiol (ESTRACE) 0.5 MG tablet Take 1 tablet (0.5 mg total) by mouth once daily.    fluoxetine (PROZAC) 40 MG capsule Take 1 capsule (40 mg total) by mouth every morning.    fluticasone (FLONASE) 50 mcg/actuation nasal spray     furosemide (LASIX) 20 MG tablet Take 1 tablet (20 mg total) by mouth 2 (two) times daily.    hydrochlorothiazide (HYDRODIURIL) 25 MG tablet Take 1 tablet (25 mg total) by mouth once daily.    losartan (COZAAR) 100 MG tablet Take 1 tablet (100 mg total) by mouth once daily.    medroxyPROGESTERone (PROVERA) 2.5 MG tablet Take 1 tablet (2.5 mg total) by mouth once daily.    MELATONIN ORAL Take by mouth.    sodium bicarbonate 325 MG tablet Take 325 mg by mouth 4 (four) times daily.    trazodone (DESYREL) 50 MG tablet Take 1 tablet (50 mg total) by mouth every evening.    triamcinolone acetonide 0.1% (KENALOG) 0.1 % cream 1 application 2 (two) times daily. Apply to affected area    ergocalciferol (ERGOCALCIFEROL) 50,000 unit Cap Take 1 capsule (50,000 Units total) by mouth once daily.           Clinical  "Reference Information           Your Vitals Were     BP Pulse Height Weight BMI    164/82 78 5' 8" (1.727 m) 112.4 kg (247 lb 12.8 oz) 37.68 kg/m2      Blood Pressure          Most Recent Value    BP  (!)  164/82      Allergies as of 3/23/2017     No Known Allergies      Immunizations Administered on Date of Encounter - 3/23/2017     None      Instructions    Take calcium 1200mg daily and Vit D 2000 IU daily       Language Assistance Services     ATTENTION: Language assistance services are available, free of charge. Please call [phone].      ATENCIÓN: Si habla español, tiene a schuster disposición servicios gratuitos de asistencia lingüística. Llame al [phone].     RISHABH Ý: N?u b?n nói Ti?ng Vi?t, có các d?ch v? h? tr? ngôn ng? mi?n phí dành cho b?n. G?i s? [phone].         [facility] complies with applicable Federal civil rights laws and does not discriminate on the basis of race, color, national origin, age, disability, or sex.        "

## 2017-03-23 NOTE — PROGRESS NOTES
Subjective:       Patient ID: Luisana Chun is a 71 y.o. female.    Chief Complaint: Osteoporosis    HPI Comments: Ms. Chun is presenting as a new patient for osteoporosis    DXA from 2/2016 notable for osteopenia of both left and right fem neck and right total hip.  History of right wrist fracture 40 years ago, however traumatic    Had research dxa at [facility] in 3/9/17. Osteopenia of hip and l-spine, however osteoporosis of wrist  Concern that may have included soft tissue of ulna in density calculation    History of lumbar vertebral biopsy in 2016, pathology did not suggest malignancy.  Mom with osteoporosis and dad may have had osteoporosis. Mom had broken humerus    Does not take calcium, takes calcitriol  Elevated pth since 2/2016    Vit d deficiency based on Jan 2017 labs    HTN uncontrolled today takes losartan, hctz    No regular exercise  Planning on gastric bypass surgery in 6 months    Works in real estate management    Review of Systems   Constitutional: Negative for unexpected weight change.   Eyes: Negative for visual disturbance.   Respiratory: Negative for shortness of breath.    Cardiovascular: Negative for chest pain.   Gastrointestinal: Negative for abdominal pain.   Musculoskeletal: Negative for arthralgias.   Skin: Negative for wound.   Neurological: Negative for headaches.   Hematological: Does not bruise/bleed easily.   Psychiatric/Behavioral: Negative for sleep disturbance.       Objective:      Physical Exam   Constitutional: She appears well-developed and well-nourished.   overweight   HENT:   Head: Normocephalic.   Eyes: EOM are normal.   Neck: Neck supple. No thyromegaly present.   Cardiovascular: Normal rate and regular rhythm.    Pulmonary/Chest: Effort normal.   Abdominal: Soft.   Musculoskeletal: Normal range of motion. She exhibits no edema.   Neurological: She is alert.   Skin: Skin is warm. No erythema.   Psychiatric: She has a normal mood and affect.   Vitals reviewed.       Assessment:       1. Osteoporosis without pathological fracture    2. Essential hypertension    3. Obesity, Class II, BMI 35-39.9    4. Secondary hyperparathyroidism    5. Vitamin D deficiency        Plan:       1. Osteopenia of hip and lumbar spine, however possible osteoporosis of distal 1/3 radius and ulna. Concern that dxa study including soft tissue of ulna which may falsely lower BMD. Will contact [facility] to see whether they can re-calculate BMD. Hyperparathyroidism should improve with CKD improvement and vit d repletion. May consider bisphosphonate therapy if re-calculation continues to show osteoporosis.  2. BP uncontrolled. Follow-up with pcp/nephrology  3. Recommend exercise, weight loss. Considering bariatric surgery.  4. Have written for Vit D 50k units for 5 days followed by vit d 2000 IU daily. Increased pth levels and cause worsening bone density of radius.  5. As above    Patient enrolling in [facility]. Will attempt contact via [facility].  Discussed with Dr. Don Hendrix MD

## 2017-03-23 NOTE — TELEPHONE ENCOUNTER
----- Message from Zuleima El sent at 3/23/2017 10:19 AM CDT -----  Contact: [facility]: [phone]  Pharmacy is calling to verify the Vitamin D script that was sent over for the patient needing clarification on dosage and directions.  Pharmacy can be reached at [phone].    Thanks

## 2017-03-24 ENCOUNTER — OFFICE VISIT (OUTPATIENT)
Dept: FAMILY MEDICINE | Facility: CLINIC | Age: 72
End: 2017-03-24
Payer: MEDICARE

## 2017-03-24 VITALS
OXYGEN SATURATION: 96 % | TEMPERATURE: 99 F | HEART RATE: 84 BPM | BODY MASS INDEX: 37.72 KG/M2 | WEIGHT: 248.88 LBS | RESPIRATION RATE: 17 BRPM | HEIGHT: 68 IN | DIASTOLIC BLOOD PRESSURE: 82 MMHG | SYSTOLIC BLOOD PRESSURE: 148 MMHG

## 2017-03-24 DIAGNOSIS — N25.81 SECONDARY HYPERPARATHYROIDISM: ICD-10-CM

## 2017-03-24 DIAGNOSIS — I10 ESSENTIAL HYPERTENSION: Primary | ICD-10-CM

## 2017-03-24 DIAGNOSIS — K64.8 INTERNAL HEMORRHOID, BLEEDING: ICD-10-CM

## 2017-03-24 DIAGNOSIS — M81.0 OSTEOPOROSIS WITHOUT PATHOLOGICAL FRACTURE: ICD-10-CM

## 2017-03-24 DIAGNOSIS — I10 ESSENTIAL HYPERTENSION: ICD-10-CM

## 2017-03-24 DIAGNOSIS — E55.9 VITAMIN D DEFICIENCY: ICD-10-CM

## 2017-03-24 DIAGNOSIS — F51.01 PRIMARY INSOMNIA: ICD-10-CM

## 2017-03-24 PROCEDURE — 99213 OFFICE O/P EST LOW 20 MIN: CPT | Mod: PBBFAC,PO | Performed by: FAMILY MEDICINE

## 2017-03-24 PROCEDURE — 99215 OFFICE O/P EST HI 40 MIN: CPT | Mod: S$PBB,,, | Performed by: FAMILY MEDICINE

## 2017-03-24 PROCEDURE — 99999 PR PBB SHADOW E&M-EST. PATIENT-LVL III: CPT | Mod: PBBFAC,,, | Performed by: FAMILY MEDICINE

## 2017-03-24 RX ORDER — AMLODIPINE BESYLATE 10 MG/1
10 TABLET ORAL DAILY
Qty: 30 TABLET | Refills: 2 | Status: SHIPPED | OUTPATIENT
Start: 2017-03-24 | End: 2017-05-12

## 2017-03-24 RX ORDER — HYDROCORTISONE 10 MG/G
1 CREAM TOPICAL 2 TIMES DAILY PRN
Qty: 30 G | Refills: 2 | Status: SHIPPED | OUTPATIENT
Start: 2017-03-24 | End: 2017-07-22 | Stop reason: SDUPTHER

## 2017-03-24 RX ORDER — HYDROCHLOROTHIAZIDE 25 MG/1
25 TABLET ORAL DAILY
Qty: 90 TABLET | Refills: 1 | Status: SHIPPED | OUTPATIENT
Start: 2017-03-24 | End: 2017-05-12

## 2017-03-24 RX ORDER — ZOLPIDEM TARTRATE 5 MG/1
5 TABLET ORAL NIGHTLY
Qty: 30 TABLET | Refills: 1 | Status: SHIPPED | OUTPATIENT
Start: 2017-03-24 | End: 2017-04-03

## 2017-03-24 RX ORDER — LISINOPRIL 20 MG/1
20 TABLET ORAL DAILY
Qty: 90 TABLET | Refills: 1 | Status: SHIPPED | OUTPATIENT
Start: 2017-03-24 | End: 2017-03-24 | Stop reason: CLARIF

## 2017-03-24 NOTE — PROGRESS NOTES
"Chief Complaint   Patient presents with    Discuss labs results    Hypertension     discuss about blood pressure medication     Hemorrhoids       Luisana Chun is a 71 y.o. female who presents per the Chief Complaint.  Pt is known to me and was last seen by me on 2/10/2017.  All known chronic medical issues have been documented.       HPI     ROS  Review of Systems   Constitutional: Negative.  Negative for activity change, appetite change, chills, diaphoresis, fatigue, fever and unexpected weight change.   HENT: Negative.  Negative for congestion, ear pain, hearing loss, nosebleeds, postnasal drip, rhinorrhea, sinus pressure, sneezing, sore throat and trouble swallowing.    Eyes: Negative for pain and visual disturbance.   Respiratory: Negative for cough, choking and shortness of breath.    Cardiovascular: Positive for leg swelling. Negative for chest pain and palpitations.   Gastrointestinal: Positive for abdominal pain and diarrhea. Negative for constipation, nausea and vomiting.   Genitourinary: Negative for difficulty urinating, dysuria, frequency and urgency.   Musculoskeletal: Negative.  Negative for arthralgias, back pain, gait problem, joint swelling, myalgias and neck pain.   Skin: Negative.    Allergic/Immunologic: Negative for environmental allergies and food allergies.   Neurological: Negative.  Negative for dizziness, seizures, syncope, weakness, light-headedness and headaches.   Psychiatric/Behavioral: Negative.  Negative for confusion, decreased concentration, dysphoric mood and sleep disturbance. The patient is not nervous/anxious.        Physical Exam  Vitals:    03/24/17 1439   BP: (!) 148/82   Pulse: 84   Resp: 17   Temp: 98.7 °F (37.1 °C)    Body mass index is 37.85 kg/(m^2).  Weight: 112.9 kg (248 lb 14.4 oz)   Height: 5' 8" (172.7 cm)     Physical Exam   Constitutional: She is oriented to person, place, and time. She appears well-developed and well-nourished. She is active and " cooperative.  Non-toxic appearance. She does not have a sickly appearance. She does not appear ill. No distress.   HENT:   Head: Normocephalic and atraumatic.   Right Ear: Hearing, tympanic membrane, external ear and ear canal normal. No tenderness. No foreign bodies. Tympanic membrane is not injected, not scarred, not perforated, not erythematous, not retracted and not bulging. No decreased hearing is noted.   Left Ear: Hearing, tympanic membrane, external ear and ear canal normal. No tenderness. No foreign bodies. Tympanic membrane is not injected, not scarred, not perforated, not erythematous, not retracted and not bulging. No decreased hearing is noted.   Nose: Nose normal. No rhinorrhea or nasal deformity.   Mouth/Throat: Uvula is midline, oropharynx is clear and moist and mucous membranes are normal. She does not have dentures. No dental caries.   Eyes: Conjunctivae, EOM and lids are normal. Pupils are equal, round, and reactive to light. Right eye exhibits no chemosis, no discharge and no exudate. No foreign body present in the right eye. Left eye exhibits no chemosis, no discharge and no exudate. No foreign body present in the left eye. No scleral icterus.   Neck: Normal range of motion and full passive range of motion without pain. Neck supple. No thyroid mass and no thyromegaly present.   Cardiovascular: Normal rate, regular rhythm, S1 normal, S2 normal and normal heart sounds.  Exam reveals no gallop and no friction rub.    No murmur heard.  Pulmonary/Chest: Effort normal. She has no decreased breath sounds. She has no wheezes. She has no rhonchi. She has no rales. She exhibits no mass, no tenderness and no deformity.   Abdominal: Soft. Normal appearance and bowel sounds are normal. She exhibits no distension, no ascites and no mass. There is no hepatosplenomegaly. There is no tenderness. There is no rigidity, no rebound and no guarding.   Musculoskeletal:        Right shoulder: She exhibits decreased  range of motion, tenderness, bony tenderness, pain and decreased strength. She exhibits no swelling, no effusion, no crepitus, no deformity, no laceration, no spasm and normal pulse.        Right knee: She exhibits normal range of motion, no swelling, no effusion, no ecchymosis, no deformity, no laceration, no erythema, normal alignment, no LCL laxity, normal patellar mobility, no bony tenderness, normal meniscus and no MCL laxity. Tenderness found. Medial joint line and patellar tendon tenderness noted. No MCL and no LCL tenderness noted.        Left knee: She exhibits bony tenderness. She exhibits normal range of motion, no swelling, no effusion, no ecchymosis, no deformity, no laceration, no erythema, normal alignment, no LCL laxity, normal patellar mobility, normal meniscus and no MCL laxity. Tenderness found. No medial joint line, no lateral joint line, no MCL, no LCL and no patellar tendon tenderness noted.   Lymphadenopathy:        Head (right side): No submental, no submandibular, no tonsillar, no preauricular and no posterior auricular adenopathy present.        Head (left side): No submental, no submandibular, no tonsillar, no preauricular and no posterior auricular adenopathy present.     She has no cervical adenopathy.   Neurological: She is alert and oriented to person, place, and time. She has normal strength. No cranial nerve deficit or sensory deficit. She exhibits normal muscle tone. She displays no seizure activity.   Skin: Skin is warm, dry and intact. No rash noted. She is not diaphoretic. No pallor.   Psychiatric: She has a normal mood and affect. Her speech is normal and behavior is normal. Judgment and thought content normal. Cognition and memory are normal. She is attentive.   Vitals reviewed.      Assessment & Plan    1. Essential hypertension  Patient was counseled and encouraged to maintain a low sodium diet, as well as increasing physical activity.  Recommend random BP checks at home on a  regular basis.  Will continue medication at this time, and follow up as recommended, or sooner if blood pressure is not well controlled.  Will add CCB to current therapy.  - amlodipine (NORVASC) 10 MG tablet; Take 1 tablet (10 mg total) by mouth once daily.  Dispense: 30 tablet; Refill: 2    2. Secondary hyperparathyroidism  Managed by Nephrology and Endocrinology; recent BMD test suggested osteoporosis.  Patient was started on additional medication for short time and once test recalculated, management will be adjusted.      3. Osteoporosis without pathological fracture  Stable; no therapeutic changes at this time.  Recent BMD in question by Endocrinology; request to recalculate still pending.    4. Vitamin D deficiency  Stable; no therapeutic changes at this time.  Managed by Endocrinology; continue OTC therapy.    5. Primary insomnia  Patient was advised to practice good sleep hygiene, which includes decreasing stimulation at least one hour before bedtime.  This includes no television, no stimulation reading or games.  Natural sleep aids were recommended as well including lavender and chamomile.  The problem of recurrent insomnia is discussed. Avoidance of caffeine sources is strongly encouraged. Sleep hygiene issues are reviewed.    - zolpidem (AMBIEN) 5 MG Tab; Take 1 tablet (5 mg total) by mouth nightly.  Dispense: 30 tablet; Refill: 1    6. Internal hemorrhoid, bleeding  Discussed benign nature of internal hemorrhoid; steroid rectal cream prescribed for symptom relief.  - hydrocortisone (PROCTO-RAZA) 1 % Crea; Place 1 applicator rectally 2 (two) times daily as needed.  Dispense: 30 g; Refill: 2    Follow up documented    ACTIVE MEDICAL ISSUES:  Documented in Problem List    PAST MEDICAL HISTORY  Documented    PAST SURGICAL HISTORY:  Documented    SOCIAL HISTORY:  Documented    FAMILY HISTORY:  Documented    ALLERGIES AND MEDICATIONS: updated and reviewed.  Documented    Health Maintenance       Date Due  Completion Date    TETANUS VACCINE 9/16/1963 ---    Colonoscopy 9/16/1995 ---    Zoster Vaccine 9/16/2005 ---    Influenza Vaccine 8/1/2016 ---    Pneumococcal (65+) (1 of 2 - PCV13) 6/23/2017 (Originally 9/16/2010) ---    Mammogram 2/17/2018 2/17/2016    DEXA SCAN 2/16/2019 2/16/2016    Lipid Panel 1/13/2022 1/13/2017

## 2017-03-24 NOTE — TELEPHONE ENCOUNTER
[facility] pharmacy requesting refill of medications.  Please advise.    LOV 2/10/2017  Last refill    8/15/2016 - hctz                     6/24/2016 - lisinopril  (discontinued 7/8/2016)    /78   2/10/2017  CMP  6/30/2016        Patient has appointment scheduled for today (3/24/2017).

## 2017-03-24 NOTE — TELEPHONE ENCOUNTER
----- Message from Myriam Olivier sent at 3/24/2017  9:35 AM CDT -----  Contact: [facility] needs hydrochlorothiazide (HYDRODIURIL) 25 MG tablet and lisinopril (PRINIVIL,ZESTRIL) 20 MG tablet refilled. Please call [phone]. Thanks

## 2017-03-27 NOTE — TELEPHONE ENCOUNTER
Called to inform patient of refill approval hctz, lisinopril; no answer, unable to leave message - voicemail not set up.

## 2017-03-29 ENCOUNTER — TELEPHONE (OUTPATIENT)
Dept: ENDOCRINOLOGY | Facility: CLINIC | Age: 72
End: 2017-03-29

## 2017-03-29 NOTE — TELEPHONE ENCOUNTER
Spoke with patient regarding attempt to contact [facility] to re-calculate bone density of distal 1/3 radius and ulna. Dr. Ochoa had attempted several telephone calls to [facility] but was unsuccessful. Discussed bisphosphonate therapy but patient is planning on having dental implant work in the next few months. Will plan to begin treatment following dental work. Gave office number to contact when dental work is complete to begin treatment. Recommend repeat renal panel, PTH, Vit D later this year to re-evaluate hyperparathyroidism.

## 2017-04-10 ENCOUNTER — OFFICE VISIT (OUTPATIENT)
Dept: FAMILY MEDICINE | Facility: CLINIC | Age: 72
End: 2017-04-10
Payer: MEDICARE

## 2017-04-10 VITALS
HEART RATE: 78 BPM | WEIGHT: 242.31 LBS | BODY MASS INDEX: 36.72 KG/M2 | RESPIRATION RATE: 17 BRPM | TEMPERATURE: 98 F | OXYGEN SATURATION: 97 % | SYSTOLIC BLOOD PRESSURE: 120 MMHG | HEIGHT: 68 IN | DIASTOLIC BLOOD PRESSURE: 76 MMHG

## 2017-04-10 DIAGNOSIS — N25.81 SECONDARY HYPERPARATHYROIDISM: ICD-10-CM

## 2017-04-10 DIAGNOSIS — N18.30 CKD (CHRONIC KIDNEY DISEASE), STAGE III: ICD-10-CM

## 2017-04-10 DIAGNOSIS — E55.9 VITAMIN D DEFICIENCY: ICD-10-CM

## 2017-04-10 DIAGNOSIS — I10 ESSENTIAL HYPERTENSION: Primary | ICD-10-CM

## 2017-04-10 PROCEDURE — 99999 PR PBB SHADOW E&M-EST. PATIENT-LVL III: CPT | Mod: PBBFAC,,, | Performed by: FAMILY MEDICINE

## 2017-04-10 PROCEDURE — 99213 OFFICE O/P EST LOW 20 MIN: CPT | Mod: PBBFAC,PO | Performed by: FAMILY MEDICINE

## 2017-04-10 PROCEDURE — 99214 OFFICE O/P EST MOD 30 MIN: CPT | Mod: S$PBB,,, | Performed by: FAMILY MEDICINE

## 2017-04-10 NOTE — PROGRESS NOTES
Chief Complaint   Patient presents with    Leg Swelling     would like to discuss about blood pressure meds     Leg Pain    legs cramps       Luisana Chun is a 71 y.o. female who presents per the Chief Complaint.  Pt is known to me and was last seen by me on 3/24/2017.  All known chronic medical issues have been documented.       Leg Pain    The incident occurred more than 1 week ago. The incident occurred at home. There was no injury mechanism. The pain is present in the left ankle and right ankle. The quality of the pain is described as aching. The pain is at a severity of 4/10. The pain is moderate. The pain has been constant since onset. Pertinent negatives include no inability to bear weight, loss of motion, loss of sensation, muscle weakness, numbness or tingling. She reports no foreign bodies present. Nothing aggravates the symptoms. She has tried nothing for the symptoms.   Diarrhea    This is a chronic problem. The current episode started more than 1 year ago. The problem occurs 5 to 10 times per day. The problem has been unchanged. The stool consistency is described as watery. The patient states that diarrhea awakens her from sleep. Associated symptoms include abdominal pain. Pertinent negatives include no arthralgias, bloating, chills, coughing, fever, headaches, increased  flatus, myalgias, sweats, URI, vomiting or weight loss. Nothing aggravates the symptoms. She has tried anti-motility drug for the symptoms. 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 There is no history of inflammatory bowel disease, irritable bowel syndrome, a recent abdominal surgery or short gut syndrome.   Hypertension   This is a chronic problem. The current episode started more than 1 year ago. The problem is unchanged. The problem is uncontrolled. Associated symptoms include anxiety. Pertinent negatives  include no blurred vision, chest pain, headaches, malaise/fatigue, neck pain, orthopnea, palpitations, peripheral edema, PND, shortness of breath or sweats. There are no associated agents to hypertension. Risk factors for coronary artery disease include obesity, post-menopausal state and sedentary lifestyle. Past treatments include angiotensin blockers and diuretics. The current treatment provides moderate improvement. Compliance problems include diet and exercise.  Hypertensive end-organ damage includes kidney disease. There is no history of angina, CAD/MI, CVA, heart failure, left ventricular hypertrophy, PVD, renovascular disease, retinopathy or a thyroid problem. Identifiable causes of hypertension include chronic renal disease. There is no history of coarctation of the aorta, hyperaldosteronism, hypercortisolism, hyperparathyroidism, a hypertension causing med, pheochromocytoma or sleep apnea.        ROS  Review of Systems   Constitutional: Negative.  Negative for activity change, appetite change, chills, diaphoresis, fatigue, fever, malaise/fatigue, unexpected weight change and weight loss.   HENT: Negative.  Negative for congestion, ear pain, hearing loss, nosebleeds, postnasal drip, rhinorrhea, sinus pressure, sneezing, sore throat and trouble swallowing.    Eyes: Negative for blurred vision, pain and visual disturbance.   Respiratory: Negative for cough, choking and shortness of breath.    Cardiovascular: Positive for leg swelling. Negative for chest pain, palpitations, orthopnea and PND.   Gastrointestinal: Positive for abdominal pain and diarrhea. Negative for bloating, constipation, flatus, nausea and vomiting.   Genitourinary: Negative for difficulty urinating, dysuria, frequency and urgency.   Musculoskeletal: Negative.  Negative for arthralgias, back pain, gait problem, joint swelling, myalgias and neck pain.   Skin: Negative.    Allergic/Immunologic: Negative for environmental allergies and food  "allergies.   Neurological: Negative.  Negative for dizziness, tingling, seizures, syncope, weakness, light-headedness, numbness and headaches.   Psychiatric/Behavioral: Negative.  Negative for confusion, decreased concentration, dysphoric mood and sleep disturbance. The patient is not nervous/anxious.        Physical Exam  Vitals:    04/10/17 1511   BP: 120/76   Pulse: 78   Resp: 17   Temp: 97.9 °F (36.6 °C)    Body mass index is 36.84 kg/(m^2).  Weight: 109.9 kg (242 lb 4.6 oz)   Height: 5' 8" (172.7 cm)     Physical Exam   Constitutional: She is oriented to person, place, and time. She appears well-developed and well-nourished. She is active and cooperative.  Non-toxic appearance. She does not have a sickly appearance. She does not appear ill. No distress.   HENT:   Head: Normocephalic and atraumatic.   Right Ear: Hearing, tympanic membrane, external ear and ear canal normal. No tenderness. No foreign bodies. Tympanic membrane is not injected, not scarred, not perforated, not erythematous, not retracted and not bulging. No decreased hearing is noted.   Left Ear: Hearing, tympanic membrane, external ear and ear canal normal. No tenderness. No foreign bodies. Tympanic membrane is not injected, not scarred, not perforated, not erythematous, not retracted and not bulging. No decreased hearing is noted.   Nose: Nose normal. No rhinorrhea or nasal deformity.   Mouth/Throat: Uvula is midline, oropharynx is clear and moist and mucous membranes are normal. She does not have dentures. No dental caries.   Eyes: Conjunctivae, EOM and lids are normal. Pupils are equal, round, and reactive to light. Right eye exhibits no chemosis, no discharge and no exudate. No foreign body present in the right eye. Left eye exhibits no chemosis, no discharge and no exudate. No foreign body present in the left eye. No scleral icterus.   Neck: Normal range of motion and full passive range of motion without pain. Neck supple. No thyroid mass and " no thyromegaly present.   Cardiovascular: Normal rate, regular rhythm, S1 normal, S2 normal and normal heart sounds.  Exam reveals no gallop and no friction rub.    No murmur heard.  Pulmonary/Chest: Effort normal. She has no decreased breath sounds. She has no wheezes. She has no rhonchi. She has no rales. She exhibits no mass, no tenderness and no deformity.   Abdominal: Soft. Normal appearance and bowel sounds are normal. She exhibits no distension, no ascites and no mass. There is no hepatosplenomegaly. There is no tenderness. There is no rigidity, no rebound and no guarding.   Musculoskeletal:        Right shoulder: She exhibits decreased range of motion, tenderness, bony tenderness, pain and decreased strength. She exhibits no swelling, no effusion, no crepitus, no deformity, no laceration, no spasm and normal pulse.        Right knee: She exhibits normal range of motion, no swelling, no effusion, no ecchymosis, no deformity, no laceration, no erythema, normal alignment, no LCL laxity, normal patellar mobility, no bony tenderness, normal meniscus and no MCL laxity. Tenderness found. Medial joint line and patellar tendon tenderness noted. No MCL and no LCL tenderness noted.        Left knee: She exhibits bony tenderness. She exhibits normal range of motion, no swelling, no effusion, no ecchymosis, no deformity, no laceration, no erythema, normal alignment, no LCL laxity, normal patellar mobility, normal meniscus and no MCL laxity. Tenderness found. No medial joint line, no lateral joint line, no MCL, no LCL and no patellar tendon tenderness noted.        Right ankle: She exhibits swelling. She exhibits normal range of motion, no ecchymosis, no deformity, no laceration and normal pulse. Tenderness. No lateral malleolus, no medial malleolus, no AITFL, no CF ligament, no posterior TFL, no head of 5th metatarsal and no proximal fibula tenderness found. Achilles tendon normal.        Left ankle: She exhibits  swelling. She exhibits normal range of motion, no ecchymosis, no deformity, no laceration and normal pulse. Tenderness. No lateral malleolus, no medial malleolus, no AITFL, no CF ligament, no posterior TFL, no head of 5th metatarsal and no proximal fibula tenderness found. Achilles tendon normal. Achilles tendon exhibits no pain, no defect and normal Colin's test results.   Lymphadenopathy:        Head (right side): No submental, no submandibular, no tonsillar, no preauricular and no posterior auricular adenopathy present.        Head (left side): No submental, no submandibular, no tonsillar, no preauricular and no posterior auricular adenopathy present.     She has no cervical adenopathy.   Neurological: She is alert and oriented to person, place, and time. She has normal strength. No cranial nerve deficit or sensory deficit. She exhibits normal muscle tone. She displays no seizure activity.   Skin: Skin is warm, dry and intact. No rash noted. She is not diaphoretic. No pallor.   Psychiatric: She has a normal mood and affect. Her speech is normal and behavior is normal. Judgment and thought content normal. Cognition and memory are normal. She is attentive.   Vitals reviewed.      Assessment & Plan    1. Essential hypertension  Patient was counseled and encouraged to maintain a low sodium diet, as well as increasing physical activity.  Recommend random BP checks at home on a regular basis.  Will continue medication at this time, and follow up as recommended, or sooner if blood pressure is not well controlled.  Will reduce dosage to 5 mg and monitor for decrease in leg swelling and continued BP control.    2. CKD (chronic kidney disease), stage III  Stable; encouraged patient to avoid NSAID medications and to increase water and clear liquid hydration.  Will continue to monitor for decline and refer as necessary.     3. Secondary hyperparathyroidism  Managed by Nephrology; Stable; no therapeutic changes at this  time.     4. Vitamin D deficiency  The current medical regimen is effective at this time and no changes to present plan or medications will be made at this visit.       Follow up documented    ACTIVE MEDICAL ISSUES:  Documented in Problem List    PAST MEDICAL HISTORY  Documented    PAST SURGICAL HISTORY:  Documented    SOCIAL HISTORY:  Documented    FAMILY HISTORY:  Documented    ALLERGIES AND MEDICATIONS: updated and reviewed.  Documented    Health Maintenance       Date Due Completion Date    TETANUS VACCINE 9/16/1963 ---    Colonoscopy 9/16/1995 ---    Zoster Vaccine 9/16/2005 ---    Influenza Vaccine 8/1/2016 ---    Pneumococcal (65+) (1 of 2 - PCV13) 6/23/2017 (Originally 9/16/2010) ---    Mammogram 2/17/2018 2/17/2016    DEXA SCAN 2/16/2019 2/16/2016    Lipid Panel 1/13/2022 1/13/2017

## 2017-04-20 ENCOUNTER — TELEPHONE (OUTPATIENT)
Dept: FAMILY MEDICINE | Facility: CLINIC | Age: 72
End: 2017-04-20

## 2017-04-20 NOTE — TELEPHONE ENCOUNTER
----- Message from Kathleen Washington sent at 4/19/2017  4:09 PM CDT -----  Contact: Pharmacist   Pharmacist requesting a refill on pt hormones medication. Pt is not sure of name. Please call [phone].

## 2017-04-20 NOTE — TELEPHONE ENCOUNTER
LOV  4/10/2017  Last refill  estradoil 12/14/2016 11 refills        Provera  12/14/2016  11 refills   Left message informing patient of previous script and refills; and to call office is these are the not the medications she is requesting refills for.

## 2017-04-22 DIAGNOSIS — R19.7 DIARRHEA FOLLOWING GASTROINTESTINAL SURGERY: ICD-10-CM

## 2017-04-22 DIAGNOSIS — Z98.890 DIARRHEA FOLLOWING GASTROINTESTINAL SURGERY: ICD-10-CM

## 2017-04-24 ENCOUNTER — OFFICE VISIT (OUTPATIENT)
Dept: PODIATRY | Facility: CLINIC | Age: 72
End: 2017-04-24
Payer: MEDICARE

## 2017-04-24 VITALS
DIASTOLIC BLOOD PRESSURE: 64 MMHG | SYSTOLIC BLOOD PRESSURE: 120 MMHG | WEIGHT: 242 LBS | HEIGHT: 68 IN | BODY MASS INDEX: 36.68 KG/M2

## 2017-04-24 DIAGNOSIS — M20.31 ACQUIRED HALLUX MALLEUS OF RIGHT FOOT: ICD-10-CM

## 2017-04-24 DIAGNOSIS — M20.42 HAMMER TOES OF BOTH FEET: ICD-10-CM

## 2017-04-24 DIAGNOSIS — M20.41 HAMMER TOES OF BOTH FEET: ICD-10-CM

## 2017-04-24 DIAGNOSIS — M72.2 PLANTAR FASCIITIS: ICD-10-CM

## 2017-04-24 DIAGNOSIS — N18.30 CKD (CHRONIC KIDNEY DISEASE), STAGE III: Primary | ICD-10-CM

## 2017-04-24 DIAGNOSIS — M79.672 FOOT PAIN, LEFT: ICD-10-CM

## 2017-04-24 DIAGNOSIS — Q82.8 POROKERATOSIS: ICD-10-CM

## 2017-04-24 PROCEDURE — 99999 PR PBB SHADOW E&M-EST. PATIENT-LVL II: CPT | Mod: PBBFAC,,, | Performed by: PODIATRIST

## 2017-04-24 PROCEDURE — 11056 PARNG/CUTG B9 HYPRKR LES 2-4: CPT | Performed by: PODIATRIST

## 2017-04-24 PROCEDURE — 99212 OFFICE O/P EST SF 10 MIN: CPT | Mod: PBBFAC,PO,25 | Performed by: PODIATRIST

## 2017-04-24 PROCEDURE — 11056 PARNG/CUTG B9 HYPRKR LES 2-4: CPT | Mod: Q9,S$PBB,, | Performed by: PODIATRIST

## 2017-04-24 PROCEDURE — 99213 OFFICE O/P EST LOW 20 MIN: CPT | Mod: 25,S$PBB,, | Performed by: PODIATRIST

## 2017-04-24 NOTE — PATIENT INSTRUCTIONS
Recommend lotions: eucerin, aquaphor, A&D ointment, gold bond for diabetics, sween    Shoe recommendations: (try 6pm.com, zappos[employer] , nordstromrack.[employer], or shoes[employer] for discounted prices) you can visit [employer] in [place] as well    Asics (GT 1000 or gel foundations), new balance, saucony (stabil c3),  Christie (transcend), vionic, propet (tennis shoe)    soft brand, clarks, crocs, aerosoles, naturalizers, SAS, ecco, jovani, gopal joseph, rockports (dress shoes)    Vionic, volitiles, burkenstocks, fitflops, propet (sandals)    Nike comfort thong sandals, crocs (house shoes)    Nail Home remedy:  Vicks Vapor rub OR Listerine and apple cider vinegar in a spray bottle to nails    Occasional soaks for 15-20 mins in luke warm water with 1 cup of listerine and 1 cup of apple cider vinegar are ok You may add several drops of oil of oregano or tea tree oil as well      Understanding Plantar Fasciitis    Plantar fasciitis is a condition that causes foot and heel pain. The plantar fascia is a tough band of tissue that runs across the bottom of the foot from the heel to the toes. This tissue pulls on the heel bone. It supports the arch of the foot as it pushes off the ground. If the tissue becomes irritated or red and swollen (inflamed), it is called plantar fasciitis.  How to say it  PLAN-tuhr fa-see-IY-tis   What causes plantar fasciitis?  Plantar fasciitis most often occurs from overusing the plantar fascia. The tissue may become damaged from activities that put repeated stress on the heel and foot. Or it may wear down over time with age and ankle stiffness. You are more likely to have plantar fasciitis if you:  · Do activities that require a lot of running, jumping, or dancing  · Have a job that requires being on your feet for long periods  · Are overweight or obese  · Have certain foot problems, such as a tight Achilles tendon, flat feet, or high arches  · Often wear poorly fitting shoes  Symptoms of plantar fasciitis  The  condition most often causes pain in the heel and the bottom of the foot. The pain may occur when you take your first steps in the morning. It may get better as you walk throughout the day. But as you continue to put weight on the foot, the pain often returns. Pain may also occur after standing or sitting for long periods.  Treating plantar fasciitis  Treatments for plantar fasciitis include:  · Resting the foot. This involves limiting movements that make your foot hurt. You may also need to avoid certain sports and types of work for a time.  · Using cold packs. Put an ice pack on the heel and foot to help reduce pain and swelling.  · Taking pain medicines. Prescription and over-the-counter pain medicines can help relieve pain and swelling.  · Using heel cups or foot inserts (orthotics). These are placed in the shoes to help support the heel or arch and cushion the heel. You may also be told to buy proper-fitting shoes with good arch support and cushioned soles.  · Taping the foot. This supports the arch and limits the movement of the plantar fascia to help relieve symptoms.  · Wearing a night splint. This stretches the plantar fascia and leg muscles while you sleep. This may help relieve pain.  · Doing exercises and physical therapy. These stretch and strengthen the plantar fascia and the muscles in the leg that support the heel and foot.  · Getting shots of medicine into the foot. These may help relieve symptoms for a time.  · Having surgery. This may be needed if other treatments fail to relieve symptoms. During surgery, the surgeon may partially cut the plantar fascia to release tension.  Possible complications of plantar fasciitis  Without proper care and treatment, healing may take longer than normal. Also, symptoms may continue or get worse. Over time, the plantar fascia may be damaged. This can make it hard to walk or even stand without pain.  When to call your healthcare provider  Call your healthcare  provider right away if you have any of these:  · Fever of 100.4°F (38°C) or higher, or as directed  · Symptoms that dont get better with treatment, or get worse  · New symptoms, such as numbness, tingling, or weakness in the foot   © 6158-9121 [employer]. [address] [place] [address], [place] [address]. All rights reserved. This information is not intended as a substitute for professional medical care. Always follow your healthcare professional's instructions.        Treating Plantar Fasciitis  First, your healthcare provider tries to determine the cause of your problem in order to suggest ways to relieve pain. If your pain is due to poor foot mechanics, custom-made shoe inserts (orthoses) may help.    Reduce symptoms  · To relieve mild symptoms, try aspirin, ibuprofen, or other medicines as directed. Rubbing ice on the affected area may also help.  · To reduce severe pain and swelling, your healthcare provider may prescribe pills or injections or a walking cast in some instances. Physical therapy, such as ultrasound or a daily stretching program, may also be recommended. Surgery is rarely required.  · To reduce symptoms caused by poor foot mechanics, your foot may be taped. This supports the arch and temporarily controls movement. Night splints may also help by stretching the fascia.  Control movement  If taping helps, your healthcare provider may prescribe orthoses. Built from plaster casts of your feet, these inserts control the way your foot moves. As a result, your symptoms should go away.  Reduce overuse  Every time your foot strikes the ground, the plantar fascia is stretched. You can reduce the strain on the plantar fascia and the possibility of overuse by following these suggestions:  · Lose any excess weight.  · Avoid running on hard or uneven ground.  · Use orthoses at all times in your shoes and house slippers.  If surgery is needed  Your healthcare provider may consider surgery if other types  of treatment don't control your pain. During surgery, the plantar fascia is partially cut to release tension. As you heal, fibrous tissue fills the space between the heel bone and the plantar fascia.   © 8351-3105 [employer] [facility]. [address] [place] [address], [place] [address]. All rights reserved. This information is not intended as a substitute for professional medical care. Always follow your healthcare professional's instructions.        Wearing Proper Shoes                    You walk on your feet every day, forcing them to support the weight of your body. Repeated stress on your feet can cause damage over time. The right shoes can help protect your feet. The wrong shoes can cause more foot problems. Read the information below to help you find a shoe that fits your foot needs.     A good shoe fit will cover your foot outline.       A shoe that doesnt cover the outline is a bad fit.      Whats Your Foot Shape?  To get a good fit, you need to know the shape of your foot. Do this simple test: While standing, place your foot on a piece of paper and trace around it. Is your foot straight or curved? Do you have a foot problem, such as a bunion, that causes your foot outline to show a bulge on the side of your big toe?  Finding Your Fit  Bring your foot outline to the [employer] store to help you find the right shoe. Place a shoe you like on top of the outline to see if it matches the shape. The shoe should cover the outline. (If you have a bunion, the shoe may not cover the bulge on the outline. Look for soft leather shoes to stretch over the bunion.) Once youve found a pair of proper shoes, put them on. Walk around. Be sure the shoes dont rub or pinch. If the shoes feel good, youve found your fit!  The Right Shoe for You  A good shoe has features that provide comfort and support. It must also be the right size and shape for your feet. Look for a shoe made of breathable fabric and lining, such as leather or  canvas. Be sure that shoes have enough tread to prevent slipping. Go to a good shoe store for help finding the right shoe.  Good Shoe Features  An ideal shoe has the following:  · Laces for support. If tying laces is a problem for you, try shoes with Velcro fasteners or nata.  · A front of the shoe (toe box) with ½ inch space in front of your longest toes.  · An arch shape that supports your foot.  · No more than 1½ inches of heel.  · A stiff, snug back of the shoe to keep your foot from sliding around.  · A smooth lining with no rough seams.  Shoe Shopping Tips  Below are some dos and donts for when you go to the shoe store.  Do:  · Select the shoes that feel right. Wear them around the house. Then bring them to your foot doctor to check for fit. If they dont fit well, return them.  · Shop late in the day, when your feet will be slightly bigger.  · Each time you buy shoes, have both your feet measured while you are standing. Foot size changes with time.  · Pick shoes to suit their purpose. High heels are okay for an occasional night on the town. But for everyday wear, choose a more sensible shoe.  · Try on shoes while wearing any inserts specially made for your feet (orthoses).  · Try on both the right and left shoes. If your feet are different sizes, pick a pair that fits the larger foot.  Dont:  · Dont buy shoes based on shoe size alone. Always try on shoes, as sizes differ from brand to brand and within brands.  · Dont expect shoes to break in. If they dont fit at the store, dont buy them.  · Dont buy a shoe that doesnt match your foot shape.  What About Socks?  Always wear socks with shoes. Socks help absorb sweat and reduce friction and blistering. When shopping for shoes, choose soft, padded socks with seams that dont irritate your feet.  If You Have Foot Problems  Some foot problems cause deformities. This can make it hard to find a good fit. Look for shoes made of soft leather to stretch  over the deformity. If you have bunions, buy shoes with a wider toe box. To fit hammertoes, look for shoes with a tall toe box. If you have arch problems, you may need inserts. In some cases, youll need to have custom footwear or orthoses made for your feet.  Suggested Footwear  Ask your healthcare provider what kind of footwear you need. He or she may recommend a certain brand or shoe store.  © 4873-0955 [employer]. [address] [place] [address]. All rights reserved. This information is not intended as a substitute for professional medical care. Always follow your healthcare professional's instructions.        Toe Extension (Flexibility)    These instructions are for your right foot. Switch sides for your left foot.  1. Sit in a chair. Rest your right ankle on your left knee.  2. Hold your toes with your right hand. Gently bend the toes backward. Feel a stretch in the undersides of the toes and ball of the foot. Hold for 30 to 60 seconds.  3. Then gently bend the toes in the other direction. Gently press on them until your foot is pointed. Hold for 30 to 60 seconds.  4. Repeat 5 times, or as instructed.  © 4825-1026 [employer]. [address] [place] [address]. All rights reserved. This information is not intended as a substitute for professional medical care. Always follow your healthcare professional's instructions.      Ankle Alphabet (Flexibility)    These instructions are for your right foot. Switch sides for your left foot.  5. Sit on the floor with your legs straight in front of you.  6. Rest your right calf on a rolled-up towel. Use your foot to write the letters of the alphabet in mid-air.  7. Repeat this exercise 3 times a day, or as instructed.  Date Last Reviewed: 3/10/2016  © 0469-4605 [employer]. [address] [place] [address]. All rights reserved. This information is not intended as a substitute for professional medical  care. Always follow your healthcare professional's instructions.        Calf Raise (Strength)    8. Stand up straight with both feet flat on the floor, slightly apart. Hold onto a sturdy chair, railing, counter, or table.  9. Raise both heels so youre standing on the balls of your feet. Dont lock your knees or arch your back. Hold for 5 seconds. Then slowly lower your heels back down to the floor.  10. Repeat 10 times, or as instructed.  11. Do this exercise 3 times a day, or as instructed.     Challenge yourself  As you become stronger, do this exercise on one foot at a time.   Date Last Reviewed: 3/10/2016  © 7458-4657 [employer]. [address] [place] [address]. All rights reserved. This information is not intended as a substitute for professional medical care. Always follow your healthcare professional's instructions.        Bent-Knee Calf Stretch  This exercise is designed to stretch and strengthen your feet and ankles. Before beginning the exercise, read through all the instructions. While exercising, breathe normally and dont bounce. If you feel any pain, stop the exercise. If pain persists, inform your healthcare provider:    · Stand an arms length away from a wall. Place the palms of your hands on the wall. Step forward about 12 inches with your ______ foot.  · Keeping toes pointed forward and both heels on the floor, bend both knees and lean forward. Hold for ______ seconds. Relax.  · Repeat ______ times. Do ______ sets a day.  Date Last Reviewed: 8/16/2015  © 3540-4050 [employer]. [address] [place] [address]. All rights reserved. This information is not intended as a substitute for professional medical care. Always follow your healthcare professional's instructions.        Arch retraining    These exercises are for your right foot. Switch sides for your left foot.  12. Sit in a chair or stand with both feet flat on the floor. Press down with the  ball of your right foot, but only on the left side of the foot, just under the big toe.  13. Then pull the bottom of your big toe back toward your heel. This should pull up the arch of your foot. Dont flex your toes while doing this. It is a subtle movement of the arch.  14. Hold for 5 seconds. Relax.  Date Last Reviewed: 3/10/2016  © 9287-3011 [employer]. [address] [place] [address]. All rights reserved. This information is not intended as a substitute for professional medical care. Always follow your healthcare professional's instructions.        Ankle Dorsiflexion/Plantarflexion (Flexibility)    15. Sit on the floor or in bed with your legs straight in front of you.  16. Point both feet. Then flex both feet.  17. Do this 10 to 30 times in a row.  18. Repeat this exercise 2 times a day, or as instructed.  Date Last Reviewed: 5/1/2016 © 2000-2016 [employer]. [address] [place] [address]. All rights reserved. This information is not intended as a substitute for professional medical care. Always follow your healthcare professional's instructions.

## 2017-04-24 NOTE — PROGRESS NOTES
"Subjective:      Patient ID: Luisana Chun is a 71 y.o. female.    Chief Complaint: Heel Pain (pcp Dr. Lombard 4/10/17)      Luisana Chun is a 71 y.o. female who presents to the podiatry clinic  with complaint of  left foot pain. Description: moderate Nature: sharp Location: plantar and forefoot Onset of the symptoms was several months ago. Precipitating event: stepped on a "splinter".  History of injury: no Current symptoms include: ability to bear weight, but with some pain. Aggravating factors: any weight bearing. Alleviating factors: rest Symptoms have progressed to a point and plateaued. Patient has had no prior foot problems. Evaluation to date: none. Treatment to date: self treatment with razor and soaks. Patients rates pain 7/10 on pain scale.    Patient also complains of spurring to the left heel, painful in the morning    Paresthesia to the feet including feeling like being cut with razor blades in the evening.    Patient admits to barefoot walking often in and around home    Current shoe gear:  [employer]    Patient Active Problem List   Diagnosis    Osteoporosis without pathological fracture    Menopausal state    CKD (chronic kidney disease), stage III    Secondary hyperparathyroidism    Essential hypertension    Obesity, Class II, BMI 35-39.9    Vitamin D deficiency     Current Outpatient Prescriptions on File Prior to Visit   Medication Sig Dispense Refill    amlodipine (NORVASC) 10 MG tablet Take 1 tablet (10 mg total) by mouth once daily. (Patient taking differently: Take 5 mg by mouth once daily. ) 30 tablet 2    calcitRIOL (ROCALTROL) 0.25 MCG Cap Take 1 capsule (0.25 mcg total) by mouth once daily. 90 capsule 2    CHONDROITIN SULFATE A (CHONDROITIN SULFATE ORAL) Take by mouth.      clonazePAM (KLONOPIN) 1 MG tablet Take 1 tablet (1 mg total) by mouth nightly as needed. 30 tablet 2    ergocalciferol (ERGOCALCIFEROL) 50,000 unit Cap Take 1 capsule (50,000 Units total) by mouth " once daily. 5 capsule 0    estradiol (ESTRACE) 0.5 MG tablet Take 1 tablet (0.5 mg total) by mouth once daily. 30 tablet 11    fluoxetine (PROZAC) 40 MG capsule Take 1 capsule (40 mg total) by mouth every morning. 90 capsule 3    fluticasone (FLONASE) 50 mcg/actuation nasal spray       furosemide (LASIX) 20 MG tablet Take 1 tablet (20 mg total) by mouth 2 (two) times daily. (Patient taking differently: Take 20 mg by mouth once daily. ) 30 tablet 11    hydrochlorothiazide (HYDRODIURIL) 25 MG tablet Take 1 tablet (25 mg total) by mouth once daily. (Patient taking differently: Take 25 mg by mouth daily as needed. ) 90 tablet 1    losartan (COZAAR) 100 MG tablet Take 1 tablet (100 mg total) by mouth once daily. 90 tablet 2    medroxyPROGESTERone (PROVERA) 2.5 MG tablet Take 1 tablet (2.5 mg total) by mouth once daily. 30 tablet 11    sodium bicarbonate 325 MG tablet Take 325 mg by mouth 4 (four) times daily.      trazodone (DESYREL) 50 MG tablet Take 1 tablet (50 mg total) by mouth every evening. 90 tablet 2    triamcinolone acetonide 0.1% (KENALOG) 0.1 % cream 1 application 2 (two) times daily. Apply to affected area  1    zolpidem (AMBIEN) 5 MG Tab Take 5 mg by mouth nightly as needed.       No current facility-administered medications on file prior to visit.      Review of patient's allergies indicates:  No Known Allergies  Past Surgical History:   Procedure Laterality Date    APPENDECTOMY      BACK SURGERY  february 2016    COLONOSCOPY      ESOPHAGOGASTRODUODENOSCOPY      KNEE SURGERY Bilateral     left toe Left     TONSILLECTOMY       Family History   Problem Relation Age of Onset    Hypertension Mother     Heart disease Mother     Macular degeneration Mother     Hypertension Father     Cataracts Father     Glaucoma Father     Macular degeneration Father     Glaucoma Brother     Macular degeneration Brother     No Known Problems Sister     No Known Problems Maternal Aunt     No Known  "Problems Maternal Uncle     No Known Problems Paternal Aunt     No Known Problems Paternal Uncle     Macular degeneration Maternal Grandmother     Macular degeneration Maternal Grandfather     Macular degeneration Paternal Grandmother     Glaucoma Paternal Grandfather     Macular degeneration Paternal Grandfather     Amblyopia Neg Hx     Blindness Neg Hx     Cancer Neg Hx     Diabetes Neg Hx     Retinal detachment Neg Hx     Strabismus Neg Hx     Stroke Neg Hx     Thyroid disease Neg Hx      Social History     Social History    Marital status:      Spouse name: N/A    Number of children: N/A    Years of education: N/A     Occupational History    Not on file.     Social History Main Topics    Smoking status: Former Smoker    Smokeless tobacco: Never Used    Alcohol use 0.0 oz/week     0 Standard drinks or equivalent per week      Comment: social    Drug use: No    Sexual activity: No     Other Topics Concern    Not on file     Social History Narrative       Review of Systems   Constitution: Negative for chills, fever and weakness.   Cardiovascular: Positive for leg swelling. Negative for claudication.   Respiratory: Negative for cough and shortness of breath.    Skin: Positive for dry skin and suspicious lesions (left forefoot). Negative for itching, nail changes and rash.   Musculoskeletal: Positive for arthritis, back pain, joint pain and myalgias. Negative for falls, joint swelling and muscle weakness.   Gastrointestinal: Negative for diarrhea, nausea and vomiting.   Neurological: Positive for numbness and paresthesias. Negative for tremors.   Psychiatric/Behavioral: Negative for altered mental status and hallucinations.           Objective:       Vitals:    04/24/17 0907   BP: 120/64   Weight: 109.8 kg (242 lb)   Height: 5' 8" (1.727 m)       Physical Exam   Constitutional: She appears well-developed and well-nourished. No distress.   Alert and oriented x 3. No evidence of " depression, anxiety, or agitation. Calm, cooperative, and communicative. Appropriate interactions and affect.       Cardiovascular:   Pulses:       Dorsalis pedis pulses are 2+ on the right side, and 2+ on the left side.        Posterior tibial pulses are 1+ on the right side, and 1+ on the left side.   Dorsalis pedis and posterior tibial pulses are diminished Bilaterally.     There is decreased digital hair. Skin is atrophic, slightly hyperpigmented    1+ pitting edema           Musculoskeletal:        Right ankle: She exhibits no swelling and no ecchymosis. No tenderness. Achilles tendon exhibits no pain.        Left ankle: She exhibits no swelling and no ecchymosis. No tenderness. Achilles tendon exhibits no pain.        Right foot: There is normal range of motion, no swelling and normal capillary refill.        Left foot: There is tenderness (sub 4th MTPJ and plantar medial heel). There is normal range of motion, no swelling and normal capillary refill.    Muscle strength is 5/5 in all groups bilaterally.    Decreased stride, station of gait.  apropulsive toe off.  Increased angle and base of gait.    Patient has hammertoes of digits 2-5 bilateral partially reducible without symptom today. Hallux malleus of the right foot    Fat pad atrophy to heels and met heads bilateral    Biomechanical exam: Pain on palpation left medial calcaneal tubercle at origin of plantar fascia. There is equinus deformity bilateral with decreased dorsiflexion at the ankle joint bilateral. No tenderness with compression of heel. Negative tinels sign. Gait analysis reveals excessive pronation through midstance and propulsion with early heel off. Shoes reveals lateral heel counter wear bilateral    Lymphadenopathy:   No lymphatic streaking    Negative lymphadenopathy bilateral popliteal fossa and tarsal tunnel.     Neurological:   [place] 5.07 monofilament is intact bilateral feet. Sharp/dull sensation is also intact Bilateral  feet.    Paresthesias, and hyperesthesia bilateral feet at toes with no clearly identified trigger or source.       Skin: Skin is warm, dry and intact. No abrasion, no ecchymosis and no rash noted. She is not diaphoretic. No cyanosis or erythema. No pallor. Nails show no clubbing.   Focal hyperkeratotic lesion with painful central core consisting entirely of hyperkeratotic tissue without open skin, drainage, pus, fluctuance, malodor, or signs of infection: sub 4th MTPJ left foot    Focal hyperkeratotic lesion consisting entirely of hyperkeratotic tissue without open skin, drainage, pus, fluctuance, malodor, or signs of infection  sub 5th MTPJ left foot       Psychiatric: She has a normal mood and affect. Her speech is normal and behavior is normal.   Nursing note and vitals reviewed.          Assessment:       Encounter Diagnoses   Name Primary?    CKD (chronic kidney disease), stage III Yes    Foot pain, left     Porokeratosis     Plantar fasciitis - Left Foot     Hammer toes of both feet     Acquired hallux malleus of right foot          Plan:       Luisana was seen today for heel pain.    Diagnoses and all orders for this visit:    CKD (chronic kidney disease), stage III    Foot pain, left    Porokeratosis    Plantar fasciitis - Left Foot    Hammer toes of both feet    Acquired hallux malleus of right foot      I counseled the patient on her conditions, their implications and medical management.    Shoe inspection. Foot Education. Patient instructed on proper foot hygeine. We discussed wearing proper shoe gear, daily foot inspections, never walking without protective shoe gear, never putting sharp instruments to feet    Discussed different treatment options for heel pain. I gave written and verbal instructions on stretching exercises. Patient expressed understanding. Discussed icing the affected area as needed and also wearing appropriate shoe gear and avoiding flats, slippers, sandals, and going barefoot. My  recommendation for OTC supports is [facility]. Patient instructed on adequate icing techniques. Patient should ice the affected area at least once per day x 10 minutes for 10 days . I advised the patient that extra icing would also be beneficial to ensure adequate anti inflammatory effect. We also discussed cortisone injections and NSAID therapy.    Discussed conservative and surgical options for the treatment of hammer digit condition.    Patient was advised on supportive shoe gear, offloading the area in shoe gear, use of a pumice stone, and skin emollients to slow the progression of callus formation. After cleansing the  area w/ alcohol prep pad the above mentioned hyperkeratosis was trimmed utilizing No 15 scapel, to a smooth base with out incident.  Attention was then directed to the [facility] center where this area was carefully excised. No incident. Patient tolerated this well and reported comfort to the area of sub 4th MTPJ left foot and sub 5th MTPJ left foot    Pt to RTC  In 4-6 months, sooner PRN

## 2017-04-24 NOTE — MR AVS SNAPSHOT
[facility]  [address] [facility]  [place] [address]  Phone: [phone]                  Luisana Chun   2017 9:15 AM   Office Visit    Description:  Female : 1945   Provider:  Yisel Walters DPM   Department:  [facility]           Reason for Visit     Heel Pain           Diagnoses this Visit        Comments    CKD (chronic kidney disease), stage III    -  Primary     Foot pain, left         Porokeratosis         Plantar fasciitis         Hammer toes of both feet         Acquired hallux malleus of right foot                To Do List           Future Appointments        Provider Department Dept Phone    2017 10:45 AM SPECIMEN, [facility] [phone]    2017 11:00 AM LAB, [facility] [phone]    5/10/2017 3:30 PM Natasha Roman RD [facility] - [place], [place] - [facility] [address] [phone]      Goals (5 Years of Data)     None      [facility]     [facility] -  Assistance  Unless otherwise directed by your provider, please contact [facility], our nurse care line that is available for  assistance.     Registered nurses in the [facility] provide: appointment scheduling, clinical advisement, health education, and other advisory services.  Call: [phone] (toll free)               Medications           Message regarding Medications     Verify the changes and/or additions to your medication regime listed below are the same as discussed with your clinician today.  If any of these changes or additions are incorrect, please notify your healthcare provider.             Verify that the below list of medications is an accurate representation of the medications you are currently taking.  If none reported, the list may be blank. If incorrect, please contact your healthcare provider. Carry this list with you in case of emergency.           Current Medications     amlodipine (NORVASC) 10 MG tablet  "Take 1 tablet (10 mg total) by mouth once daily.    calcitRIOL (ROCALTROL) 0.25 MCG Cap Take 1 capsule (0.25 mcg total) by mouth once daily.    CHONDROITIN SULFATE A (CHONDROITIN SULFATE ORAL) Take by mouth.    clonazePAM (KLONOPIN) 1 MG tablet Take 1 tablet (1 mg total) by mouth nightly as needed.    ergocalciferol (ERGOCALCIFEROL) 50,000 unit Cap Take 1 capsule (50,000 Units total) by mouth once daily.    estradiol (ESTRACE) 0.5 MG tablet Take 1 tablet (0.5 mg total) by mouth once daily.    fluoxetine (PROZAC) 40 MG capsule Take 1 capsule (40 mg total) by mouth every morning.    fluticasone (FLONASE) 50 mcg/actuation nasal spray     furosemide (LASIX) 20 MG tablet Take 1 tablet (20 mg total) by mouth 2 (two) times daily.    hydrochlorothiazide (HYDRODIURIL) 25 MG tablet Take 1 tablet (25 mg total) by mouth once daily.    losartan (COZAAR) 100 MG tablet Take 1 tablet (100 mg total) by mouth once daily.    medroxyPROGESTERone (PROVERA) 2.5 MG tablet Take 1 tablet (2.5 mg total) by mouth once daily.    PROCTO-RAZA 1 % crpe     sodium bicarbonate 325 MG tablet Take 325 mg by mouth 4 (four) times daily.    trazodone (DESYREL) 50 MG tablet Take 1 tablet (50 mg total) by mouth every evening.    triamcinolone acetonide 0.1% (KENALOG) 0.1 % cream 1 application 2 (two) times daily. Apply to affected area    zolpidem (AMBIEN) 5 MG Tab Take 5 mg by mouth nightly as needed.           Clinical Reference Information           Your Vitals Were     BP Height Weight BMI       120/64 5' 8" (1.727 m) 109.8 kg (242 lb) 36.8 kg/m2       Blood Pressure          Most Recent Value    BP  120/64      Allergies as of 4/24/2017     No Known Allergies      Immunizations Administered on Date of Encounter - 4/24/2017     None      Instructions    Recommend lotions: eucerin, aquaphor, A&D ointment, gold bond for diabetics, sween    Shoe recommendations: (try 6pm.com, zappos.com , nordstromrack.com, or shoes.com for discounted prices) you can " visit [employer] shoes in [place] as well    Asics (GT 1000 or gel foundations), new balance, saucony (stabil c3),  Christie (transcend), vionic, propet (tennis shoe)    soft brand, clarks, crocs, aerosoles, naturalizers, SAS, ecco, jovani, gopal joseph, rockports (dress shoes)    Vionic, volitiles, burkenstocks, fitflops, propet (sandals)    Nike comfort thong sandals, crocs (house shoes)    Nail Home remedy:  Vicks Vapor rub OR Listerine and apple cider vinegar in a spray bottle to nails    Occasional soaks for 15-20 mins in luke warm water with 1 cup of listerine and 1 cup of apple cider vinegar are ok You may add several drops of oil of oregano or tea tree oil as well      Understanding Plantar Fasciitis    Plantar fasciitis is a condition that causes foot and heel pain. The plantar fascia is a tough band of tissue that runs across the bottom of the foot from the heel to the toes. This tissue pulls on the heel bone. It supports the arch of the foot as it pushes off the ground. If the tissue becomes irritated or red and swollen (inflamed), it is called plantar fasciitis.  How to say it  PLAN-tuhr fa-see-IY-tis   What causes plantar fasciitis?  Plantar fasciitis most often occurs from overusing the plantar fascia. The tissue may become damaged from activities that put repeated stress on the heel and foot. Or it may wear down over time with age and ankle stiffness. You are more likely to have plantar fasciitis if you:  · Do activities that require a lot of running, jumping, or dancing  · Have a job that requires being on your feet for long periods  · Are overweight or obese  · Have certain foot problems, such as a tight Achilles tendon, flat feet, or high arches  · Often wear poorly fitting shoes  Symptoms of plantar fasciitis  The condition most often causes pain in the heel and the bottom of the foot. The pain may occur when you take your first steps in the morning. It may get better as you walk throughout the day. But as you  continue to put weight on the foot, the pain often returns. Pain may also occur after standing or sitting for long periods.  Treating plantar fasciitis  Treatments for plantar fasciitis include:  · Resting the foot. This involves limiting movements that make your foot hurt. You may also need to avoid certain sports and types of work for a time.  · Using cold packs. Put an ice pack on the heel and foot to help reduce pain and swelling.  · Taking pain medicines. Prescription and over-the-counter pain medicines can help relieve pain and swelling.  · Using heel cups or foot inserts (orthotics). These are placed in the shoes to help support the heel or arch and cushion the heel. You may also be told to buy proper-fitting shoes with good arch support and cushioned soles.  · Taping the foot. This supports the arch and limits the movement of the plantar fascia to help relieve symptoms.  · Wearing a night splint. This stretches the plantar fascia and leg muscles while you sleep. This may help relieve pain.  · Doing exercises and physical therapy. These stretch and strengthen the plantar fascia and the muscles in the leg that support the heel and foot.  · Getting shots of medicine into the foot. These may help relieve symptoms for a time.  · Having surgery. This may be needed if other treatments fail to relieve symptoms. During surgery, the surgeon may partially cut the plantar fascia to release tension.  Possible complications of plantar fasciitis  Without proper care and treatment, healing may take longer than normal. Also, symptoms may continue or get worse. Over time, the plantar fascia may be damaged. This can make it hard to walk or even stand without pain.  When to call your healthcare provider  Call your healthcare provider right away if you have any of these:  · Fever of 100.4°F (38°C) or higher, or as directed  · Symptoms that dont get better with treatment, or get worse  · New symptoms, such as numbness, tingling,  or weakness in the foot   © 0392-6089 [employer]. [address] [place] [address], [place] [address]. All rights reserved. This information is not intended as a substitute for professional medical care. Always follow your healthcare professional's instructions.        Treating Plantar Fasciitis  First, your healthcare provider tries to determine the cause of your problem in order to suggest ways to relieve pain. If your pain is due to poor foot mechanics, custom-made shoe inserts (orthoses) may help.    Reduce symptoms  · To relieve mild symptoms, try aspirin, ibuprofen, or other medicines as directed. Rubbing ice on the affected area may also help.  · To reduce severe pain and swelling, your healthcare provider may prescribe pills or injections or a walking cast in some instances. Physical therapy, such as ultrasound or a daily stretching program, may also be recommended. Surgery is rarely required.  · To reduce symptoms caused by poor foot mechanics, your foot may be taped. This supports the arch and temporarily controls movement. Night splints may also help by stretching the fascia.  Control movement  If taping helps, your healthcare provider may prescribe orthoses. Built from plaster casts of your feet, these inserts control the way your foot moves. As a result, your symptoms should go away.  Reduce overuse  Every time your foot strikes the ground, the plantar fascia is stretched. You can reduce the strain on the plantar fascia and the possibility of overuse by following these suggestions:  · Lose any excess weight.  · Avoid running on hard or uneven ground.  · Use orthoses at all times in your shoes and house slippers.  If surgery is needed  Your healthcare provider may consider surgery if other types of treatment don't control your pain. During surgery, the plantar fascia is partially cut to release tension. As you heal, fibrous tissue fills the space between the heel bone and the plantar fascia.   ©  7610-1872 [employer]. [address] [place] [address], [place] [address]. All rights reserved. This information is not intended as a substitute for professional medical care. Always follow your healthcare professional's instructions.        Wearing Proper Shoes                    You walk on your feet every day, forcing them to support the weight of your body. Repeated stress on your feet can cause damage over time. The right shoes can help protect your feet. The wrong shoes can cause more foot problems. Read the information below to help you find a shoe that fits your foot needs.     A good shoe fit will cover your foot outline.       A shoe that doesnt cover the outline is a bad fit.      Whats Your Foot Shape?  To get a good fit, you need to know the shape of your foot. Do this simple test: While standing, place your foot on a piece of paper and trace around it. Is your foot straight or curved? Do you have a foot problem, such as a bunion, that causes your foot outline to show a bulge on the side of your big toe?  Finding Your Fit  Bring your foot outline to the shore store to help you find the right shoe. Place a shoe you like on top of the outline to see if it matches the shape. The shoe should cover the outline. (If you have a bunion, the shoe may not cover the bulge on the outline. Look for soft leather shoes to stretch over the bunion.) Once youve found a pair of proper shoes, put them on. Walk around. Be sure the shoes dont rub or pinch. If the shoes feel good, youve found your fit!  The Right Shoe for You  A good shoe has features that provide comfort and support. It must also be the right size and shape for your feet. Look for a shoe made of breathable fabric and lining, such as leather or canvas. Be sure that shoes have enough tread to prevent slipping. Go to a good shoe store for help finding the right shoe.  Good Shoe Features  An ideal shoe has the following:  · Laces for support. If tying  laces is a problem for you, try shoes with Velcro fasteners or nata.  · A front of the shoe (toe box) with ½ inch space in front of your longest toes.  · An arch shape that supports your foot.  · No more than 1½ inches of heel.  · A stiff, snug back of the shoe to keep your foot from sliding around.  · A smooth lining with no rough seams.  Shoe Shopping Tips  Below are some dos and donts for when you go to the shoe store.  Do:  · Select the shoes that feel right. Wear them around the house. Then bring them to your foot doctor to check for fit. If they dont fit well, return them.  · Shop late in the day, when your feet will be slightly bigger.  · Each time you buy shoes, have both your feet measured while you are standing. Foot size changes with time.  · Pick shoes to suit their purpose. High heels are okay for an occasional night on the town. But for everyday wear, choose a more sensible shoe.  · Try on shoes while wearing any inserts specially made for your feet (orthoses).  · Try on both the right and left shoes. If your feet are different sizes, pick a pair that fits the larger foot.  Dont:  · Dont buy shoes based on shoe size alone. Always try on shoes, as sizes differ from brand to brand and within brands.  · Dont expect shoes to break in. If they dont fit at the store, dont buy them.  · Dont buy a shoe that doesnt match your foot shape.  What About Socks?  Always wear socks with shoes. Socks help absorb sweat and reduce friction and blistering. When shopping for shoes, choose soft, padded socks with seams that dont irritate your feet.  If You Have Foot Problems  Some foot problems cause deformities. This can make it hard to find a good fit. Look for shoes made of soft leather to stretch over the deformity. If you have bunions, buy shoes with a wider toe box. To fit hammertoes, look for shoes with a tall toe box. If you have arch problems, you may need inserts. In some cases, youll need to have  custom footwear or orthoses made for your feet.  Suggested Footwear  Ask your healthcare provider what kind of footwear you need. He or she may recommend a certain brand or shoe store.  © 9968-6362 [employer]. [address] [place] [address]. All rights reserved. This information is not intended as a substitute for professional medical care. Always follow your healthcare professional's instructions.        Toe Extension (Flexibility)    These instructions are for your right foot. Switch sides for your left foot.  1. Sit in a chair. Rest your right ankle on your left knee.  2. Hold your toes with your right hand. Gently bend the toes backward. Feel a stretch in the undersides of the toes and ball of the foot. Hold for 30 to 60 seconds.  3. Then gently bend the toes in the other direction. Gently press on them until your foot is pointed. Hold for 30 to 60 seconds.  4. Repeat 5 times, or as instructed.  © 6126-8926 [employer]. [address] [place] [address]. All rights reserved. This information is not intended as a substitute for professional medical care. Always follow your healthcare professional's instructions.      Ankle Alphabet (Flexibility)    These instructions are for your right foot. Switch sides for your left foot.  5. Sit on the floor with your legs straight in front of you.  6. Rest your right calf on a rolled-up towel. Use your foot to write the letters of the alphabet in mid-air.  7. Repeat this exercise 3 times a day, or as instructed.  Date Last Reviewed: 3/10/2016  © 8076-8706 [employer]. [address] [place] [address]. All rights reserved. This information is not intended as a substitute for professional medical care. Always follow your healthcare professional's instructions.        Calf Raise (Strength)    8. Stand up straight with both feet flat on the floor, slightly apart. Hold onto a sturdy chair, railing, counter, or  table.  9. Raise both heels so youre standing on the balls of your feet. Dont lock your knees or arch your back. Hold for 5 seconds. Then slowly lower your heels back down to the floor.  10. Repeat 10 times, or as instructed.  11. Do this exercise 3 times a day, or as instructed.     Challenge yourself  As you become stronger, do this exercise on one foot at a time.   Date Last Reviewed: 3/10/2016  © 1741-2123 [employer]. [address] [place] [address]. All rights reserved. This information is not intended as a substitute for professional medical care. Always follow your healthcare professional's instructions.        Bent-Knee Calf Stretch  This exercise is designed to stretch and strengthen your feet and ankles. Before beginning the exercise, read through all the instructions. While exercising, breathe normally and dont bounce. If you feel any pain, stop the exercise. If pain persists, inform your healthcare provider:    · Stand an arms length away from a wall. Place the palms of your hands on the wall. Step forward about 12 inches with your ______ foot.  · Keeping toes pointed forward and both heels on the floor, bend both knees and lean forward. Hold for ______ seconds. Relax.  · Repeat ______ times. Do ______ sets a day.  Date Last Reviewed: 8/16/2015  © 9853-9587 [employer]. [address] [place] [address]. All rights reserved. This information is not intended as a substitute for professional medical care. Always follow your healthcare professional's instructions.        Arch retraining    These exercises are for your right foot. Switch sides for your left foot.  12. Sit in a chair or stand with both feet flat on the floor. Press down with the ball of your right foot, but only on the left side of the foot, just under the big toe.  13. Then pull the bottom of your big toe back toward your heel. This should pull up the arch of your foot. Dont flex your toes  while doing this. It is a subtle movement of the arch.  14. Hold for 5 seconds. Relax.  Date Last Reviewed: 3/10/2016  © 6484-7611 [employer]. [address] [place] [address]. All rights reserved. This information is not intended as a substitute for professional medical care. Always follow your healthcare professional's instructions.        Ankle Dorsiflexion/Plantarflexion (Flexibility)    15. Sit on the floor or in bed with your legs straight in front of you.  16. Point both feet. Then flex both feet.  17. Do this 10 to 30 times in a row.  18. Repeat this exercise 2 times a day, or as instructed.  Date Last Reviewed: 5/1/2016 © 2000-2016 [employer]. [address] [place] [address]. All rights reserved. This information is not intended as a substitute for professional medical care. Always follow your healthcare professional's instructions.             Language Assistance Services     ATTENTION: Language assistance services are available, free of charge. Please call [phone].      ATENCIÓN: Si habla kaileeañol, tiene a schuster disposición servicios gratuitos de asistencia lingüística. Llame al [phone].     RISHABH Ý: N?u b?n nói Ti?ng Vi?t, có các d?ch v? h? tr? ngôn ng? mi?n phí dành cho b?n. G?i s? [phone].         [facility] complies with applicable Federal civil rights laws and does not discriminate on the basis of race, color, national origin, age, disability, or sex.

## 2017-04-26 RX ORDER — DIPHENOXYLATE HYDROCHLORIDE AND ATROPINE SULFATE 2.5; .025 MG/1; MG/1
TABLET ORAL
Qty: 180 TABLET | Refills: 0 | Status: SHIPPED | OUTPATIENT
Start: 2017-04-26 | End: 2017-05-17

## 2017-05-01 ENCOUNTER — TELEPHONE (OUTPATIENT)
Dept: FAMILY MEDICINE | Facility: CLINIC | Age: 72
End: 2017-05-01

## 2017-05-01 ENCOUNTER — LAB VISIT (OUTPATIENT)
Dept: LAB | Facility: HOSPITAL | Age: 72
End: 2017-05-01
Attending: INTERNAL MEDICINE
Payer: MEDICARE

## 2017-05-01 DIAGNOSIS — N18.30 CKD (CHRONIC KIDNEY DISEASE), STAGE III: ICD-10-CM

## 2017-05-01 NOTE — TELEPHONE ENCOUNTER
----- Message from Lacy Smith sent at 5/1/2017 10:22 AM CDT -----  Contact: Self   Patient returned your call from 4/20. Please call at  [phone]

## 2017-05-02 ENCOUNTER — TELEPHONE (OUTPATIENT)
Dept: NUTRITION | Facility: HOSPITAL | Age: 72
End: 2017-05-02

## 2017-05-02 NOTE — TELEPHONE ENCOUNTER
Informed pt that referral was sent to Dr. Campos for evaluation of KING david to inability to schedule appointment with [facility].  Also reminded pt to nutrition appointment scheduled for next week (5/10/17) at 3:30 pm in [facility] [address].  Pt verbalized understanding.

## 2017-05-04 ENCOUNTER — OFFICE VISIT (OUTPATIENT)
Dept: FAMILY MEDICINE | Facility: CLINIC | Age: 72
End: 2017-05-04
Payer: MEDICARE

## 2017-05-04 VITALS
BODY MASS INDEX: 37.72 KG/M2 | DIASTOLIC BLOOD PRESSURE: 78 MMHG | WEIGHT: 248.88 LBS | TEMPERATURE: 98 F | RESPIRATION RATE: 16 BRPM | HEART RATE: 72 BPM | OXYGEN SATURATION: 97 % | SYSTOLIC BLOOD PRESSURE: 140 MMHG | HEIGHT: 68 IN

## 2017-05-04 DIAGNOSIS — Z23 NEED FOR VACCINATION WITH 13-POLYVALENT PNEUMOCOCCAL CONJUGATE VACCINE: ICD-10-CM

## 2017-05-04 DIAGNOSIS — N17.9 AKI (ACUTE KIDNEY INJURY): Primary | ICD-10-CM

## 2017-05-04 DIAGNOSIS — D64.9 NORMOCYTIC ANEMIA: ICD-10-CM

## 2017-05-04 DIAGNOSIS — N25.81 SECONDARY HYPERPARATHYROIDISM: ICD-10-CM

## 2017-05-04 DIAGNOSIS — N18.9 HISTORY OF ANEMIA DUE TO CHRONIC KIDNEY DISEASE: ICD-10-CM

## 2017-05-04 DIAGNOSIS — K12.2 ORAL CELLULITIS: ICD-10-CM

## 2017-05-04 DIAGNOSIS — Z86.2 HISTORY OF ANEMIA DUE TO CHRONIC KIDNEY DISEASE: ICD-10-CM

## 2017-05-04 DIAGNOSIS — E55.9 HYPOVITAMINOSIS D: ICD-10-CM

## 2017-05-04 PROCEDURE — 99999 PR PBB SHADOW E&M-EST. PATIENT-LVL IV: CPT | Mod: PBBFAC,,, | Performed by: FAMILY MEDICINE

## 2017-05-04 PROCEDURE — 90670 PCV13 VACCINE IM: CPT | Mod: PBBFAC,PO | Performed by: FAMILY MEDICINE

## 2017-05-04 PROCEDURE — 99215 OFFICE O/P EST HI 40 MIN: CPT | Mod: S$PBB,,, | Performed by: FAMILY MEDICINE

## 2017-05-04 PROCEDURE — 99214 OFFICE O/P EST MOD 30 MIN: CPT | Mod: PBBFAC,25,PO | Performed by: FAMILY MEDICINE

## 2017-05-04 RX ORDER — CALCITRIOL 0.5 UG/1
0.5 CAPSULE ORAL DAILY
Qty: 90 CAPSULE | Refills: 0 | Status: SHIPPED | OUTPATIENT
Start: 2017-05-04 | End: 2017-08-10 | Stop reason: SDUPTHER

## 2017-05-04 RX ORDER — AMOXICILLIN 500 MG/1
500 TABLET, FILM COATED ORAL EVERY 12 HOURS
Qty: 20 TABLET | Refills: 0 | Status: SHIPPED | OUTPATIENT
Start: 2017-05-04 | End: 2017-05-14

## 2017-05-04 RX ORDER — FLUCONAZOLE 150 MG/1
150 TABLET ORAL ONCE
Qty: 1 TABLET | Refills: 0 | Status: SHIPPED | OUTPATIENT
Start: 2017-05-04 | End: 2017-05-04

## 2017-05-04 NOTE — PROGRESS NOTES
Subjective:       Patient ID: Luisana Chun is a 71 y.o. female.    Chief Complaint: Results (follow up on recent labs ) and Mouth Lesions (x4 days )    HPI    Pt here for review of her test results. Her results show that her PTH has increased, her GFR has decreased and her Hg is stable.     She is also has developed a peridental infection x 4 days that is worsening. No fever, chills. She took a friends bactrim rx without improvement.     CRISTEL  - no NSAID use, pt has been assuring rehydration. She is asymptomatic. No new medications.     Hyperparathyroidism - worsening - Pt is adherent with drisdol and calcitriol. No constipation or muscle aches.      Current Outpatient Prescriptions on File Prior to Visit   Medication Sig Dispense Refill    amlodipine (NORVASC) 10 MG tablet Take 1 tablet (10 mg total) by mouth once daily. (Patient taking differently: Take 5 mg by mouth once daily. ) 30 tablet 2    CHONDROITIN SULFATE A (CHONDROITIN SULFATE ORAL) Take by mouth once daily at 6am.       clonazePAM (KLONOPIN) 1 MG tablet Take 1 tablet (1 mg total) by mouth nightly as needed. 30 tablet 2    diphenoxylate-atropine 2.5-0.025 mg (LOMOTIL) 2.5-0.025 mg per tablet TAKE TWO TABLETS BY MOUTH THREE TIMES DAILY AS NEEDED 180 tablet 0    ergocalciferol (ERGOCALCIFEROL) 50,000 unit Cap Take 1 capsule (50,000 Units total) by mouth once daily. 5 capsule 0    estradiol (ESTRACE) 0.5 MG tablet Take 1 tablet (0.5 mg total) by mouth once daily. 30 tablet 11    fluoxetine (PROZAC) 40 MG capsule Take 1 capsule (40 mg total) by mouth every morning. 90 capsule 3    fluticasone (FLONASE) 50 mcg/actuation nasal spray by Nasal route as needed.       furosemide (LASIX) 20 MG tablet Take 1 tablet (20 mg total) by mouth 2 (two) times daily. (Patient taking differently: Take 20 mg by mouth once daily. ) 30 tablet 11    hydrochlorothiazide (HYDRODIURIL) 25 MG tablet Take 1 tablet (25 mg total) by mouth once daily. (Patient taking  differently: Take 25 mg by mouth daily as needed. ) 90 tablet 1    losartan (COZAAR) 100 MG tablet Take 1 tablet (100 mg total) by mouth once daily. 90 tablet 2    medroxyPROGESTERone (PROVERA) 2.5 MG tablet Take 1 tablet (2.5 mg total) by mouth once daily. 30 tablet 11    PROCTO-RAZA 1 % crpe once daily at 6am.       sodium bicarbonate 325 MG tablet Take 325 mg by mouth 4 (four) times daily.      trazodone (DESYREL) 50 MG tablet Take 1 tablet (50 mg total) by mouth every evening. 90 tablet 2    triamcinolone acetonide 0.1% (KENALOG) 0.1 % cream 1 application 2 (two) times daily. Apply to affected area  1    zolpidem (AMBIEN) 5 MG Tab Take 5 mg by mouth nightly as needed.      [DISCONTINUED] calcitRIOL (ROCALTROL) 0.25 MCG Cap Take 1 capsule (0.25 mcg total) by mouth once daily. 90 capsule 2     No current facility-administered medications on file prior to visit.        Past Medical History:   Diagnosis Date    Hypertension     Kidney disease, chronic, stage III (GFR 30-59 ml/min)     Osteoporosis        Family History   Problem Relation Age of Onset    Hypertension Mother     Heart disease Mother     Macular degeneration Mother     Hypertension Father     Cataracts Father     Glaucoma Father     Macular degeneration Father     Glaucoma Brother     Macular degeneration Brother     No Known Problems Sister     No Known Problems Maternal Aunt     No Known Problems Maternal Uncle     No Known Problems Paternal Aunt     No Known Problems Paternal Uncle     Macular degeneration Maternal Grandmother     Macular degeneration Maternal Grandfather     Macular degeneration Paternal Grandmother     Glaucoma Paternal Grandfather     Macular degeneration Paternal Grandfather     Amblyopia Neg Hx     Blindness Neg Hx     Cancer Neg Hx     Diabetes Neg Hx     Retinal detachment Neg Hx     Strabismus Neg Hx     Stroke Neg Hx     Thyroid disease Neg Hx         reports that she has quit smoking.  She has never used smokeless tobacco. She reports that she drinks alcohol. She reports that she does not use illicit drugs.    Review of Systems   Constitutional: Negative for chills and fever.   Gastrointestinal: Negative for constipation and vomiting.   Musculoskeletal: Negative for myalgias.       Objective:     Vitals:    05/04/17 1533   BP: (!) 140/78   Pulse: 72   Resp: 16   Temp: 98.2 °F (36.8 °C)        Physical Exam   Constitutional: She appears well-developed. No distress.   HENT:   Head: Normocephalic and atraumatic.   Mouth/Throat:       Eyes: Conjunctivae are normal. Right eye exhibits no discharge. Left eye exhibits no discharge. No scleral icterus.   Cardiovascular: Normal rate, regular rhythm and normal heart sounds.  Exam reveals no gallop and no friction rub.    No murmur heard.  Pulmonary/Chest: Effort normal and breath sounds normal. No respiratory distress. She has no wheezes. She has no rales.   Neurological: She is alert.   Skin: She is not diaphoretic.   Psychiatric: She has a normal mood and affect.   Vitals reviewed.      Assessment:       1. CRISTEL (acute kidney injury)    2. Secondary hyperparathyroidism    3. Oral cellulitis    4. Hypovitaminosis D    5. Normocytic anemia    6. History of anemia due to chronic kidney disease    7. Need for vaccination with 13-polyvalent pneumococcal conjugate vaccine        Plan:       Luisana was seen today for results and mouth lesions.    Diagnoses and all orders for this visit:    CRISTEL (acute kidney injury) - HIGH RISK  -     Basic metabolic panel; Future  -     Middleton's Stain, Urine Random  I advised increase in hydration, and to hold losartan and lasix x 1 week, at which time I will recheck her labs.     Urine for eos.     I also asked her to monitor her Bp daily.     Pt to notify me if she starts to feel ill in any way prior to when I see her next week.     Pt to keep f/u with Nephro    Secondary hyperparathyroidism  Will increase calcitriol to 0.5  daily.     Oral cellulitis  -     fluconazole (DIFLUCAN) 150 MG Tab; Take 1 tablet (150 mg total) by mouth once.  -     amoxicillin (AMOXIL) 500 MG Tab; Take 1 tablet (500 mg total) by mouth every 12 (twelve) hours.    Hypovitaminosis D  -     calcitRIOL (ROCALTROL) 0.5 MCG Cap; Take 1 capsule (0.5 mcg total) by mouth once daily.    Normocytic anemia  Chronic - secondary to renal disease. Stable. Continue iron.     History of anemia due to chronic kidney disease  - as above.    Need for vaccination with 13-polyvalent pneumococcal conjugate vaccine  -     Pneumococcal Conjugate Vaccine (13 Valent) (IM)            Return in about 6 days (around 5/10/2017).        Pt verbalized understanding and agreed with our plan.

## 2017-05-04 NOTE — MR AVS SNAPSHOT
[facility]  [address] [place] [address]  Phone: [phone]  Fax: [phone]                  Luisana Chun   2017 3:20 PM   Office Visit    Description:  Female : 1945   Provider:  Scott Dave MD   Department:  [facility]           Reason for Visit     Results     Mouth Lesions           Diagnoses this Visit        Comments    Secondary hyperparathyroidism    -  Primary     Oral cellulitis         Hypovitaminosis D         Normocytic anemia         History of anemia due to chronic kidney disease         Need for vaccination with 13-polyvalent pneumococcal conjugate vaccine         CRISTEL (acute kidney injury)                To Do List           Future Appointments        Provider Department Dept Phone    2017 3:00 PM Azikiwe K. Lombard, MD [facility] [phone]    5/10/2017 3:00 PM LAB, [place] [facility] [place] [phone]    5/10/2017 3:30 PM Natasha Roman RD [facility] [phone]    2017 2:20 PM Scott Dave MD [facility] [phone]      Goals (5 Years of Data)     None      Follow-Up and Disposition     Return in about 6 days (around 5/10/2017).       These Medications        Disp Refills Start End    fluconazole (DIFLUCAN) 150 MG Tab 1 tablet 0 2017    Take 1 tablet (150 mg total) by mouth once. - Oral    Pharmacy: [address] Ph #: [phone]       amoxicillin (AMOXIL) 500 MG Tab 20 tablet 0 2017    Take 1 tablet (500 mg total) by mouth every 12 (twelve) hours. - Oral    Pharmacy: [address] Ph #: [phone]       calcitRIOL (ROCALTROL) 0.5 MCG Cap 90 capsule 0 2017     Take 1 capsule (0.5 mcg total) by mouth once daily. - Oral    Pharmacy: [address] Ph  #: [phone]         [facility]     [facility] On [facility] Nurse Care Line - 24/7 Assistance  Unless otherwise directed by your provider, please contact [facility], our nurse care line that is available for 24/7 assistance.     Registered nurses in the [facility] provide: appointment scheduling, clinical advisement, health education, and other advisory services.  Call: [phone] (toll free)               Medications           Message regarding Medications     Verify the changes and/or additions to your medication regime listed below are the same as discussed with your clinician today.  If any of these changes or additions are incorrect, please notify your healthcare provider.        START taking these NEW medications        Refills    fluconazole (DIFLUCAN) 150 MG Tab 0    Sig: Take 1 tablet (150 mg total) by mouth once.    Class: Normal    Route: Oral    amoxicillin (AMOXIL) 500 MG Tab 0    Sig: Take 1 tablet (500 mg total) by mouth every 12 (twelve) hours.    Class: Normal    Route: Oral    calcitRIOL (ROCALTROL) 0.5 MCG Cap 0    Sig: Take 1 capsule (0.5 mcg total) by mouth once daily.    Class: Normal    Route: Oral           Verify that the below list of medications is an accurate representation of the medications you are currently taking.  If none reported, the list may be blank. If incorrect, please contact your healthcare provider. Carry this list with you in case of emergency.           Current Medications     amlodipine (NORVASC) 10 MG tablet Take 1 tablet (10 mg total) by mouth once daily.    CHONDROITIN SULFATE A (CHONDROITIN SULFATE ORAL) Take by mouth once daily at 6am.     clonazePAM (KLONOPIN) 1 MG tablet Take 1 tablet (1 mg total) by mouth nightly as needed.    diphenoxylate-atropine 2.5-0.025 mg (LOMOTIL) 2.5-0.025 mg per tablet TAKE TWO TABLETS BY MOUTH THREE TIMES DAILY AS NEEDED    ergocalciferol (ERGOCALCIFEROL) 50,000 unit Cap Take 1 capsule (50,000 Units total) by mouth  "once daily.    estradiol (ESTRACE) 0.5 MG tablet Take 1 tablet (0.5 mg total) by mouth once daily.    fluoxetine (PROZAC) 40 MG capsule Take 1 capsule (40 mg total) by mouth every morning.    fluticasone (FLONASE) 50 mcg/actuation nasal spray by Nasal route as needed.     furosemide (LASIX) 20 MG tablet Take 1 tablet (20 mg total) by mouth 2 (two) times daily.    hydrochlorothiazide (HYDRODIURIL) 25 MG tablet Take 1 tablet (25 mg total) by mouth once daily.    losartan (COZAAR) 100 MG tablet Take 1 tablet (100 mg total) by mouth once daily.    medroxyPROGESTERone (PROVERA) 2.5 MG tablet Take 1 tablet (2.5 mg total) by mouth once daily.    PROCTO-RAZA 1 % crpe once daily at 6am.     sodium bicarbonate 325 MG tablet Take 325 mg by mouth 4 (four) times daily.    trazodone (DESYREL) 50 MG tablet Take 1 tablet (50 mg total) by mouth every evening.    triamcinolone acetonide 0.1% (KENALOG) 0.1 % cream 1 application 2 (two) times daily. Apply to affected area    zolpidem (AMBIEN) 5 MG Tab Take 5 mg by mouth nightly as needed.    amoxicillin (AMOXIL) 500 MG Tab Take 1 tablet (500 mg total) by mouth every 12 (twelve) hours.    calcitRIOL (ROCALTROL) 0.5 MCG Cap Take 1 capsule (0.5 mcg total) by mouth once daily.    fluconazole (DIFLUCAN) 150 MG Tab Take 1 tablet (150 mg total) by mouth once.           Clinical Reference Information           Your Vitals Were     BP Pulse Temp Resp Height Weight    140/78 (BP Location: Right arm, Patient Position: Sitting, BP Method: Manual) 72 98.2 °F (36.8 °C) (Oral) 16 5' 8" (1.727 m) 112.9 kg (248 lb 14.4 oz)    SpO2 BMI             97% 37.85 kg/m2         Blood Pressure          Most Recent Value    BP  (!)  140/78      Allergies as of 5/4/2017     No Known Allergies      Immunizations Administered on Date of Encounter - 5/4/2017     Name Date Dose VIS Date Route    Pneumococcal Conjugate - 13 Valent  Incomplete 0.5 mL 11/5/2015 Intramuscular      Orders Placed During Today's Visit      " Normal Orders This Visit    Pneumococcal Conjugate Vaccine (13 Valent) (IM)     Future Labs/Procedures Expected by Expires    Basic metabolic panel  5/4/2017 7/3/2018      Language Assistance Services     ATTENTION: Language assistance services are available, free of charge. Please call [phone].      ATENCIÓN: Si habla chad, tiene a schuster disposición servicios gratuitos de asistencia lingüística. Llame al [phone].     CHÚ Ý: N?u b?n nói Ti?ng Vi?t, có các d?ch v? h? tr? ngôn ng? mi?n phí dành cho b?n. G?i s? [phone].         [facility] complies with applicable Federal civil rights laws and does not discriminate on the basis of race, color, national origin, age, disability, or sex.

## 2017-05-04 NOTE — PROGRESS NOTES
Administered Pneumococcal 13 vaccine IM to left deltoid.  Patient tolerated injection well, no adverse reactions noted.

## 2017-05-05 ENCOUNTER — TELEPHONE (OUTPATIENT)
Dept: NEPHROLOGY | Facility: CLINIC | Age: 72
End: 2017-05-05

## 2017-05-09 ENCOUNTER — OFFICE VISIT (OUTPATIENT)
Dept: FAMILY MEDICINE | Facility: CLINIC | Age: 72
End: 2017-05-09
Payer: MEDICARE

## 2017-05-09 VITALS
BODY MASS INDEX: 37.89 KG/M2 | SYSTOLIC BLOOD PRESSURE: 138 MMHG | RESPIRATION RATE: 17 BRPM | HEIGHT: 68 IN | DIASTOLIC BLOOD PRESSURE: 72 MMHG | WEIGHT: 250 LBS | HEART RATE: 84 BPM | TEMPERATURE: 98 F | OXYGEN SATURATION: 97 %

## 2017-05-09 DIAGNOSIS — N18.4 CKD (CHRONIC KIDNEY DISEASE), STAGE 4 (SEVERE): ICD-10-CM

## 2017-05-09 DIAGNOSIS — K12.2 ORAL CELLULITIS: ICD-10-CM

## 2017-05-09 DIAGNOSIS — E55.9 VITAMIN D DEFICIENCY: ICD-10-CM

## 2017-05-09 DIAGNOSIS — N18.9 HISTORY OF ANEMIA DUE TO CHRONIC KIDNEY DISEASE: ICD-10-CM

## 2017-05-09 DIAGNOSIS — Z86.2 HISTORY OF ANEMIA DUE TO CHRONIC KIDNEY DISEASE: ICD-10-CM

## 2017-05-09 DIAGNOSIS — I10 ESSENTIAL HYPERTENSION: Primary | ICD-10-CM

## 2017-05-09 DIAGNOSIS — N25.81 SECONDARY HYPERPARATHYROIDISM: ICD-10-CM

## 2017-05-09 PROCEDURE — 99214 OFFICE O/P EST MOD 30 MIN: CPT | Mod: S$PBB,,, | Performed by: FAMILY MEDICINE

## 2017-05-09 PROCEDURE — 99999 PR PBB SHADOW E&M-EST. PATIENT-LVL III: CPT | Mod: PBBFAC,,, | Performed by: FAMILY MEDICINE

## 2017-05-09 PROCEDURE — 99213 OFFICE O/P EST LOW 20 MIN: CPT | Mod: PBBFAC,PO | Performed by: FAMILY MEDICINE

## 2017-05-09 RX ORDER — AMOXICILLIN AND CLAVULANATE POTASSIUM 875; 125 MG/1; MG/1
TABLET, FILM COATED ORAL
COMMUNITY
Start: 2017-05-07 | End: 2017-05-09 | Stop reason: ALTCHOICE

## 2017-05-09 RX ORDER — CLINDAMYCIN HYDROCHLORIDE 150 MG/1
150 CAPSULE ORAL 2 TIMES DAILY PRN
Qty: 14 CAPSULE | Refills: 0 | Status: SHIPPED | OUTPATIENT
Start: 2017-05-09 | End: 2017-05-16

## 2017-05-09 NOTE — PROGRESS NOTES
Chief Complaint   Patient presents with    labs results follow up    Facial Pain       Luisana Chun is a 71 y.o. female who presents per the Chief Complaint.  Pt is known to me and was last seen by me on 4/10/2017.  All known chronic medical issues have been documented.       Facial Pain   This is a new problem. The current episode started in the past 7 days. The problem occurs daily. Associated symptoms include abdominal pain. Pertinent negatives include no anorexia, arthralgias, change in bowel habit, chest pain, chills, congestion, coughing, diaphoresis, fatigue, fever, headaches, joint swelling, myalgias, nausea, neck pain, numbness, rash, sore throat, swollen glands, urinary symptoms, vertigo, visual change, vomiting or weakness. The symptoms are aggravated by eating and drinking. Treatments tried: antibiotics.   Diarrhea    This is a chronic problem. The current episode started more than 1 year ago. The problem occurs 5 to 10 times per day. The problem has been unchanged. The stool consistency is described as watery. The patient states that diarrhea awakens her from sleep. Associated symptoms include abdominal pain. Pertinent negatives include no arthralgias, bloating, chills, coughing, fever, headaches, increased  flatus, myalgias, sweats, URI, vomiting or weight loss. Nothing aggravates the symptoms. She has tried anti-motility drug for the symptoms. 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 There is no history of inflammatory bowel disease, irritable bowel syndrome, a recent abdominal surgery or short gut syndrome.   Hypertension   This is a chronic problem. The current episode started more than 1 year ago. The problem is unchanged. The problem is controlled. Associated symptoms include anxiety. Pertinent negatives include no blurred vision, chest pain, headaches, malaise/fatigue, neck  pain, orthopnea, palpitations, peripheral edema, PND, shortness of breath or sweats. There are no associated agents to hypertension. Risk factors for coronary artery disease include obesity, post-menopausal state and sedentary lifestyle. Past treatments include angiotensin blockers and diuretics. The current treatment provides moderate improvement. Compliance problems include diet and exercise.  Hypertensive end-organ damage includes kidney disease. There is no history of angina, CAD/MI, CVA, heart failure, left ventricular hypertrophy, PVD, renovascular disease, retinopathy or a thyroid problem. Identifiable causes of hypertension include chronic renal disease. There is no history of coarctation of the aorta, hyperaldosteronism, hypercortisolism, hyperparathyroidism, a hypertension causing med, pheochromocytoma or sleep apnea.        ROS  Review of Systems   Constitutional: Negative.  Negative for activity change, appetite change, chills, diaphoresis, fatigue, fever, malaise/fatigue, unexpected weight change and weight loss.   HENT: Positive for facial swelling (left jaw pain). Negative for congestion, dental problem, drooling, ear discharge, ear pain, hearing loss, nosebleeds, postnasal drip, rhinorrhea, sinus pressure, sneezing, sore throat and trouble swallowing.    Eyes: Negative for blurred vision, pain and visual disturbance.   Respiratory: Negative for cough, choking and shortness of breath.    Cardiovascular: Negative for chest pain, palpitations, orthopnea, leg swelling and PND.   Gastrointestinal: Positive for abdominal pain and diarrhea. Negative for abdominal distention, anal bleeding, anorexia, bloating, blood in stool, change in bowel habit, constipation, flatus, nausea, rectal pain and vomiting.   Genitourinary: Negative for difficulty urinating, dysuria, frequency and urgency.   Musculoskeletal: Negative.  Negative for arthralgias, back pain, gait problem, joint swelling, myalgias and neck pain.  "  Skin: Negative.  Negative for rash.   Allergic/Immunologic: Negative for environmental allergies and food allergies.   Neurological: Negative.  Negative for dizziness, vertigo, tingling, seizures, syncope, weakness, light-headedness, numbness and headaches.   Psychiatric/Behavioral: Negative.  Negative for confusion, decreased concentration, dysphoric mood and sleep disturbance. The patient is not nervous/anxious.        Physical Exam  Vitals:    05/09/17 1438   BP: 138/72   Pulse: 84   Resp: 17   Temp: 98.4 °F (36.9 °C)    Body mass index is 38.01 kg/(m^2).  Weight: 113.4 kg (250 lb)   Height: 5' 8" (172.7 cm)     Physical Exam   Constitutional: She is oriented to person, place, and time. She appears well-developed and well-nourished. She is active and cooperative.  Non-toxic appearance. She does not have a sickly appearance. She does not appear ill. No distress.   HENT:   Head: Normocephalic and atraumatic.   Right Ear: Hearing, tympanic membrane, external ear and ear canal normal. No tenderness. No foreign bodies. Tympanic membrane is not injected, not scarred, not perforated, not erythematous, not retracted and not bulging. No decreased hearing is noted.   Left Ear: Hearing, tympanic membrane, external ear and ear canal normal. No tenderness. No foreign bodies. Tympanic membrane is not injected, not scarred, not perforated, not erythematous, not retracted and not bulging. No decreased hearing is noted.   Nose: Nose normal. No rhinorrhea or nasal deformity.   Mouth/Throat: Uvula is midline, oropharynx is clear and moist and mucous membranes are normal. She does not have dentures. No dental caries.   Eyes: Conjunctivae, EOM and lids are normal. Pupils are equal, round, and reactive to light. Right eye exhibits no chemosis, no discharge and no exudate. No foreign body present in the right eye. Left eye exhibits no chemosis, no discharge and no exudate. No foreign body present in the left eye. No scleral icterus. "   Neck: Normal range of motion and full passive range of motion without pain. Neck supple. No thyroid mass and no thyromegaly present.   Cardiovascular: Normal rate, regular rhythm, S1 normal, S2 normal and normal heart sounds.  Exam reveals no gallop and no friction rub.    No murmur heard.  Pulmonary/Chest: Effort normal. She has no decreased breath sounds. She has no wheezes. She has no rhonchi. She has no rales. She exhibits no mass, no tenderness and no deformity.   Abdominal: Soft. Normal appearance and bowel sounds are normal. She exhibits no distension, no ascites and no mass. There is no hepatosplenomegaly. There is no tenderness. There is no rigidity, no rebound and no guarding.   Musculoskeletal:        Right shoulder: She exhibits decreased range of motion, tenderness, bony tenderness, pain and decreased strength. She exhibits no swelling, no effusion, no crepitus, no deformity, no laceration, no spasm and normal pulse.        Right knee: She exhibits normal range of motion, no swelling, no effusion, no ecchymosis, no deformity, no laceration, no erythema, normal alignment, no LCL laxity, normal patellar mobility, no bony tenderness, normal meniscus and no MCL laxity. Tenderness found. Medial joint line and patellar tendon tenderness noted. No MCL and no LCL tenderness noted.        Left knee: She exhibits bony tenderness. She exhibits normal range of motion, no swelling, no effusion, no ecchymosis, no deformity, no laceration, no erythema, normal alignment, no LCL laxity, normal patellar mobility, normal meniscus and no MCL laxity. Tenderness found. No medial joint line, no lateral joint line, no MCL, no LCL and no patellar tendon tenderness noted.        Right ankle: She exhibits swelling. She exhibits normal range of motion, no ecchymosis, no deformity, no laceration and normal pulse. Tenderness. No lateral malleolus, no medial malleolus, no AITFL, no CF ligament, no posterior TFL, no head of 5th  metatarsal and no proximal fibula tenderness found. Achilles tendon normal.        Left ankle: She exhibits swelling. She exhibits normal range of motion, no ecchymosis, no deformity, no laceration and normal pulse. Tenderness. No lateral malleolus, no medial malleolus, no AITFL, no CF ligament, no posterior TFL, no head of 5th metatarsal and no proximal fibula tenderness found. Achilles tendon normal. Achilles tendon exhibits no pain, no defect and normal Colin's test results.   Lymphadenopathy:        Head (right side): No submental, no submandibular, no tonsillar, no preauricular and no posterior auricular adenopathy present.        Head (left side): No submental, no submandibular, no tonsillar, no preauricular and no posterior auricular adenopathy present.     She has no cervical adenopathy.   Neurological: She is alert and oriented to person, place, and time. She has normal strength. No cranial nerve deficit or sensory deficit. She exhibits normal muscle tone. She displays no seizure activity.   Skin: Skin is warm, dry and intact. No rash noted. She is not diaphoretic. No pallor.   Psychiatric: She has a normal mood and affect. Her speech is normal and behavior is normal. Judgment and thought content normal. Cognition and memory are normal. She is attentive.   Vitals reviewed.      Assessment & Plan    1. Essential hypertension  Patient was counseled and encouraged to maintain a low sodium diet, as well as increasing physical activity.  Recommend random BP checks at home on a regular basis.  Repeat BP at end of visit was not necessary. Will continue medication at this time, and follow up in 1 month, or sooner if blood pressure is not well controlled.       2. CKD (chronic kidney disease), stage 4 (severe)  Sudden drop in renal function may be acute due to HCTZ being restarted; has help all BP medication since 5/4 with improvement in BP.      3. Oral cellulitis  Advise to hold medication until 7 days before  planned surgery.    - clindamycin (Cleocin) 150 mg capsule; twice daily for 7 days    4. Secondary hyperparathyroidism  Managed by Nephrology.  Stable; no therapeutic changes at this time.     5. Vitamin D deficiency  Stable; no therapeutic changes at this time.  The current medical regimen is effective at this time and no changes to present plan or medications will be made at this visit.       6. History of anemia due to chronic kidney disease  Stable; no therapeutic changes at this time.       Follow up documented    ACTIVE MEDICAL ISSUES:  Documented in Problem List    PAST MEDICAL HISTORY  Documented    PAST SURGICAL HISTORY:  Documented    SOCIAL HISTORY:  Documented    FAMILY HISTORY:  Documented    ALLERGIES AND MEDICATIONS: updated and reviewed.  Documented    Health Maintenance       Date Due Completion Date    TETANUS VACCINE 9/16/1963 ---    Colonoscopy 9/16/1995 ---    Zoster Vaccine 9/16/2005 ---    Influenza Vaccine 8/1/2017 ---    Mammogram 2/17/2018 2/17/2016    Pneumococcal (65+) (2 of 2 - PPSV23) 5/4/2018 5/4/2017    DEXA SCAN 2/16/2019 2/16/2016    Lipid Panel 1/13/2022 1/13/2017

## 2017-05-10 ENCOUNTER — LAB VISIT (OUTPATIENT)
Dept: LAB | Facility: HOSPITAL | Age: 72
End: 2017-05-10
Attending: FAMILY MEDICINE
Payer: MEDICARE

## 2017-05-10 ENCOUNTER — NUTRITION (OUTPATIENT)
Dept: NUTRITION | Facility: HOSPITAL | Age: 72
End: 2017-05-10
Attending: SURGERY
Payer: MEDICARE

## 2017-05-10 VITALS — WEIGHT: 253.75 LBS | BODY MASS INDEX: 38.58 KG/M2

## 2017-05-10 DIAGNOSIS — D64.9 NORMOCYTIC ANEMIA: ICD-10-CM

## 2017-05-10 DIAGNOSIS — N17.9 AKI (ACUTE KIDNEY INJURY): ICD-10-CM

## 2017-05-10 DIAGNOSIS — Z71.3 PRE-BARIATRIC SURGERY NUTRITION EVALUATION: Primary | ICD-10-CM

## 2017-05-10 DIAGNOSIS — N17.9 AKI (ACUTE KIDNEY INJURY): Primary | ICD-10-CM

## 2017-05-10 DIAGNOSIS — N18.30 CKD (CHRONIC KIDNEY DISEASE), STAGE III: ICD-10-CM

## 2017-05-10 LAB
ANION GAP SERPL CALC-SCNC: 8 MMOL/L
BUN SERPL-MCNC: 27 MG/DL
CALCIUM SERPL-MCNC: 9.1 MG/DL
CHLORIDE SERPL-SCNC: 110 MMOL/L
CO2 SERPL-SCNC: 21 MMOL/L
CREAT SERPL-MCNC: 1.4 MG/DL
EST. GFR  (AFRICAN AMERICAN): 43.6 ML/MIN/1.73 M^2
EST. GFR  (NON AFRICAN AMERICAN): 37.8 ML/MIN/1.73 M^2
GLUCOSE SERPL-MCNC: 94 MG/DL
POTASSIUM SERPL-SCNC: 4.7 MMOL/L
SODIUM SERPL-SCNC: 139 MMOL/L

## 2017-05-10 PROCEDURE — 99212 OFFICE O/P EST SF 10 MIN: CPT | Mod: PBBFAC | Performed by: DIETITIAN, REGISTERED

## 2017-05-10 PROCEDURE — 99999 PR PBB SHADOW E&M-EST. PATIENT-LVL II: CPT | Mod: PBBFAC,,, | Performed by: DIETITIAN, REGISTERED

## 2017-05-10 NOTE — PROGRESS NOTES
"NUTRITIONAL CONSULT    Referring Physician: Dr. Goff   Reason for MNT Referral: Initial assessment for sleeve gastrectomy work-up    PAST MEDICAL HISTORY:   71 y.o. female presents with a BMI of Body mass index is 38.58 kg/(m^2)..  Weight history includes: States when she was younger she weighed 110#. Patient reports she started gaining weight after her first marriage. States she is unable to lose the weight.   Dieting attempts include: seeing a diet doctor (diet pills), teeth wired, low calorie/excercising     Past Medical History:   Diagnosis Date    Hypertension     Kidney disease, chronic, stage III (GFR 30-59 ml/min)     Osteoporosis        CLINICAL DATA:  71 y.o.-year-old White female.  Height: 5' 9"  Weight: 253 lbs  IBW: 169 lbs  Body mass index is 38.58 kg/(m^2).  The patient's goal weight (60 % EBW -50): 203 lbs  Personal goal weight: 160 lbs    Goal for Bariatric Surgery: to improve health, to improve quality of life, to lose weight and to prevent future medical conditions    NUTRITION & HEALTH HISTORY:  Greatest challenge: sweets and portion control    Current diet recall: 24 hr recall:   Breakfast: Kipper fish (smoked fish) (canned) + 2 pieces of toast + 2 mugs of coffee + 1/2 cup fruit juice  Lunch: 2 hamburger patties on 1 bun (whopper from [employer]) + 1 sprite  Dinner: Spaghetti noodles with marinara sauce with carrots and onions   Other: Drinks water throughout the day (64 oz)        Current Diet:  Meal pattern: x2-3 meals/day  Protein supplements: x0  Snacking: x1 / weekly   Vegetables: Likes a variety. Eats almost daily.  Fruits: Likes a variety. Eats daily.  Beverages: water, sugary beverages, sweet tea, coffee with sugar and fat-free milk, soda occasionally   Dining out: Monthly. Mostly fast food.  Cooking at home: Daily. Mostly smothered and fried meat, fish, starchy CHO and vegetables. Patient cooks on 1 skillet electric de anda pan   Protein: Likes: chicken, pork, beef, kippers, salmon, " tilapia, cottage cheese, milk, pudding, yogurt, eggs  Dislikes: nuts/seeds, hummus, tofu      Exercise:  Past exercise: Adequate    Current exercise: None, will do gardening occasionally   Restrictions to exercise: none      Vitamins / Minerals / Herbs:   Vitamin C, A, iron supplement, Sodium bicarbonate, MVI, Calcium, magnesium, CoQ10, omega 3, selenium     Food Allergies:   None     Social:  Retired.  Lives with alone with her cat.  Grocery shopping and food prep done by patient .  Patient believes the household will be supportive after surgery.  Alcohol: Socially.  Smoking: None.    ASSESSMENT:  · Patient reports attempts at weight loss, only to regain lost weight.  · Patient demonstrated knowledge of healthy eating behaviors and exercise patterns; admits to not eating healthy and not exercising at this point.  · Patient states willingness to change lifestyle and make behavior modifications.  · Expect fair  compliance after surgery at this time.    Insurance requires medically supervised diet prior to consideration for bariatric surgery.    BARIATRIC DIET DISCUSSION:  Discussed diet after surgery and related to patients food record.  Reviewed diet progression before and after surgery.  Reinforced that surgery is not a magic bullet and importance of low fat foods and no snacking.  Stressed importance of exercise and its role in achieving weight loss goals.  Answered all questions.    RECOMMENDATIONS:  Patient is a potential candidate for bariatric surgery.    Needs additional visit(s) with RD.    PLAN:  Continue to review Bariatric Nutrition Guidebook at home and call with any questions.  Work on Bariatric Nutrition Checklist.  Work on expanding variety of vegetables.  Work on gradually cutting back on starchy CHO in the diet.  1200-calorie diet.  5-6 meals per day.  Start including protein supplements in the diet plan daily.  Reduce frequency of dining out.  More grocery shopping and meal preparation at  home.  Start shopping for bariatric vitamins & minerals.  Return to clinic.    SESSION TIME:  60 minutes

## 2017-05-11 ENCOUNTER — OFFICE VISIT (OUTPATIENT)
Dept: SLEEP MEDICINE | Facility: CLINIC | Age: 72
End: 2017-05-11
Payer: MEDICARE

## 2017-05-11 ENCOUNTER — OFFICE VISIT (OUTPATIENT)
Dept: NEPHROLOGY | Facility: CLINIC | Age: 72
End: 2017-05-11
Payer: MEDICARE

## 2017-05-11 VITALS
HEIGHT: 68 IN | WEIGHT: 254 LBS | SYSTOLIC BLOOD PRESSURE: 149 MMHG | BODY MASS INDEX: 38.49 KG/M2 | OXYGEN SATURATION: 98 % | DIASTOLIC BLOOD PRESSURE: 82 MMHG | HEART RATE: 81 BPM

## 2017-05-11 VITALS — HEART RATE: 90 BPM | OXYGEN SATURATION: 98 % | SYSTOLIC BLOOD PRESSURE: 146 MMHG | DIASTOLIC BLOOD PRESSURE: 80 MMHG

## 2017-05-11 DIAGNOSIS — G47.33 OSA (OBSTRUCTIVE SLEEP APNEA): Primary | ICD-10-CM

## 2017-05-11 DIAGNOSIS — I10 ESSENTIAL HYPERTENSION: ICD-10-CM

## 2017-05-11 DIAGNOSIS — E55.9 VITAMIN D DEFICIENCY: ICD-10-CM

## 2017-05-11 DIAGNOSIS — N25.81 SECONDARY HYPERPARATHYROIDISM: ICD-10-CM

## 2017-05-11 DIAGNOSIS — G47.00 INSOMNIA, UNSPECIFIED TYPE: ICD-10-CM

## 2017-05-11 DIAGNOSIS — E66.01 MORBID OBESITY DUE TO EXCESS CALORIES: ICD-10-CM

## 2017-05-11 DIAGNOSIS — N18.30 CKD (CHRONIC KIDNEY DISEASE), STAGE III: Primary | ICD-10-CM

## 2017-05-11 PROBLEM — N18.9 HISTORY OF ANEMIA DUE TO CHRONIC KIDNEY DISEASE: Status: RESOLVED | Noted: 2017-05-04 | Resolved: 2017-05-11

## 2017-05-11 PROBLEM — Z86.2 HISTORY OF ANEMIA DUE TO CHRONIC KIDNEY DISEASE: Status: RESOLVED | Noted: 2017-05-04 | Resolved: 2017-05-11

## 2017-05-11 PROCEDURE — 99999 PR PBB SHADOW E&M-EST. PATIENT-LVL III: CPT | Mod: PBBFAC,,, | Performed by: INTERNAL MEDICINE

## 2017-05-11 PROCEDURE — 99215 OFFICE O/P EST HI 40 MIN: CPT | Mod: S$PBB,,, | Performed by: INTERNAL MEDICINE

## 2017-05-11 PROCEDURE — 99204 OFFICE O/P NEW MOD 45 MIN: CPT | Mod: S$PBB,,, | Performed by: INTERNAL MEDICINE

## 2017-05-11 PROCEDURE — 99213 OFFICE O/P EST LOW 20 MIN: CPT | Mod: PBBFAC,27,PO | Performed by: INTERNAL MEDICINE

## 2017-05-11 NOTE — LETTER
May 11, 2017      Abraham Goff MD  [address]  [address]  [facility]  [place] [address]           [facility]  [address]  [place] [address]  Phone: [phone]  Fax: [phone]          Patient: Luisana Chun   MR Number: [id number]   YOB: 1945   Date of Visit: 5/11/2017       Dear Dr. Abraham Goff:    Thank you for referring Luisana Chun to me for evaluation. Attached you will find relevant portions of my assessment and plan of care.    If you have questions, please do not hesitate to call me. I look forward to following Luisana Chun along with you.    Sincerely,    Russell Murdock MD    Enclosure  CC:  No Recipients    If you would like to receive this communication electronically, please contact [email or web address] or [phone] to request more information on [employer] access.    For providers and/or their staff who would like to refer a patient to [facility], please contact us through our one-stop-shop provider referral line, [facility], at [phone].    If you feel you have received this communication in error or would no longer like to receive these types of communications, please e-mail [email or web address]

## 2017-05-11 NOTE — PROGRESS NOTES
Subjective:       Patient ID: Luisana Chun is a 71 y.o. White female who presents for follow up on  Chronic Kidney Disease    HPI     Ms. Chun is seen in nephrology clinic for follow up on CKD. She was last followed on 12/7/16, she established care with our clinic on 2/25/16. Originally she had stated she was visiting from [place] and had plans to go back there but apparently that has not been the case. She reports she has a diagnosis of CKD and had established nephrology follow up in [place]. She reports she was told of CKD due to her chronic diarrhea?. Pt has hypertension for several years, with onset in her 20's. She denies any h/o diabetes. Pt reports h/o UTI. Prior records from [place] have not been received. She reports she has chronic diarrhea for several years but states that she had no work up done for this. She reports having leg swelling but she never had any abnormal proteinuria so etiology of leg swelling not clear.     She is enrolled in NICE study at [facility] and she had labs done from [facility] from 11/22/16. Per those labs her creatinine was 1.6 which is close to her baseline. Now she reports she has been out of that study. I have not received any communication from [facility] as to the findings of her participation into the study.     Currently she has no nausea/ vomiting/ fever/ abdominal pain/ flank pain/ decreased urine/ dysuria. She reports she does have cough and sneezing. She had labs taken on 5/1/17 for pre clinic purpose, it showed CRISTEL on her CKDIII. On further questioning it appears that she had restarted taking HCTZ in around 3/17. She claims she was drinking enough fluids but labs showed pre renal azotemia. Per her PCP who followed her since these labs, she was taken off losartan and HCTZ. But as it appears pt has stopped all of her antihypertensive medications including amlodipine. She reports she will be seeing her PCP again tomorrow to discuss her BP medicines. Since taking her off ARB and  HCTZ, she had labs taken by her PCP again yesterday which are noted for improvement in her creatinine from 1.9 to 1.4 and BUN from 47 to 27. Labs from yesterday show Na 139, K 4.7, bicarbonate 21, BUN 27, creatinine 1.4, eGFR 37.8, Ca 9.1, recent vit D 24, . She reports recently she was advised to take higher dose of calcitriol to manage her osteoporosis by her other physician. Most recent urine PC ratio normal, no eosinophils in the urine.    Prior labs showed hep C and B screen is negative, and has normal protein electrophoresis pattern. On US she has 10.5 cm kidney size. There is decreased corticomedullary differentiation and decreased cortical thickness. There is no mass/ stone/ obstruction.     Review of Systems   Constitutional: Negative for activity change, appetite change, chills, fatigue and fever.   HENT: Negative for sneezing and sore throat.    Eyes: Negative for discharge and redness.   Respiratory: Negative for cough, shortness of breath and wheezing.    Cardiovascular: Negative for chest pain and leg swelling.   Gastrointestinal: Negative for abdominal distention, abdominal pain, blood in stool, diarrhea, nausea and vomiting.   Endocrine: Negative for polydipsia and polyuria.   Genitourinary: Negative for decreased urine volume, difficulty urinating, dysuria, flank pain, frequency and hematuria.   Musculoskeletal: Negative for gait problem and joint swelling.   Skin: Negative for pallor and rash.   Neurological: Negative for dizziness, light-headedness and headaches.   Psychiatric/Behavioral: Negative for behavioral problems. The patient is not nervous/anxious.        Objective:      Physical Exam   Constitutional: She is oriented to person, place, and time. She appears well-developed. No distress.   HENT:   Head: Normocephalic and atraumatic.   Eyes: Conjunctivae are normal.   Neck: Normal range of motion. Neck supple.   Cardiovascular: Normal rate, regular rhythm and normal heart sounds.     No murmur heard.  Pulmonary/Chest: Effort normal and breath sounds normal. No respiratory distress. She has no wheezes. She has no rales.   Abdominal: Soft. Bowel sounds are normal. There is no tenderness.   Musculoskeletal: She exhibits no edema.   Neurological: She is alert and oriented to person, place, and time.   Skin: Skin is warm and dry.   Psychiatric: She has a normal mood and affect. Her behavior is normal.   Nursing note and vitals reviewed.      Assessment:       1. CKD (chronic kidney disease), stage III    2. Essential hypertension    3. Secondary hyperparathyroidism    4. Vitamin D deficiency        Plan:     Ms. Chun has longstanding hypertension, chronic diarrhea, NSAID use, she is here for nephrology visit for CKD. She is from [place] where she had an established renal follow up. She had labs from PCP office from 12/15 which show creatinine of around 1.3, eGFR of 40, K 5, bicarbonate 17 to 20. Her labs from [place] are not available for review but pt reports her creatinine and GFR are somewhat similar to these results. Her CKD III likely due to longstanding hypertension and NSAID use. We discussed about this in detail. CKD staging, potential risk of decline in renal function was discussed with her.     Most recently seen CRISTEL on CKD could be due to prerenal azotemia from HCTZ use. She has now improvement in her creatinine and BUN since she stopped taking it. Of note she has stopped taking losartan also. Now that her CRISTEL/ azotemia has improved and her kidney function is back to her baseline, I have suggested her to restart losartan. But she feels it causes leg swelling. Of note she is not taking any medicine at present for hypertension (at least for the past few days). She wants to discuss this further with her PCP tomorrow. Defer further antihypertensive management to her PCP. For long term she would benefit by using RAAS blockade to slow down progression of her CKD as long as she does not develop CRISTEL and  hyperkalemia. This was explained to her in detail. Also explained that she has US from 6/16 which shows changes of CKD. She has cortical thinning on that US. She already had CKD known to her for the past few years. Her serologic work up has been negative. As such I do not see a role in offering her invasive work up like percutaneous kidney biopsy. Especially her most recent episode of CRISTEL was likely due to volume depletion from diuretic causing pre renal azotemia.     Plan:  - avoid NSAID use ever, discussed with her, printed NSAID medication list was already given to her, have stressed to avoid it due to risk of progression of CKD  - have advised to stop multivitamin tablet as it can have K, Ca, vit D and runs risk of electrolyte disturbance as a result of such preparations   - continue calcitriol and continue to trend PTH and corrected Ca  - strict low salt diet and low K diet  - advise periodic monitoring of renal labs for electrolytes, acid base status, to rule out progression of CKD  - goal BP less than 130-140/80, advise home BP monitoring  - continue to follow with PCP for anemia management, chronic fatigue, leg edema. This was again stressed to her.   - explained to her that at present she does not need protein or phos restriction but does need low salt and low K diet  - continue sodium bicarbonate   - CKD staging and potential risk for progression of CKD d/w her     RTC 3 months with pre clinic labs  Plan d/w pt in detail including diet, labs, follow up.

## 2017-05-11 NOTE — PATIENT INSTRUCTIONS
[place] or [place] will contact you to schedule your sleep study. Their number is [phone] (ext 1). The [facility] [place] [facility] is located on 7th floor of the [facility].    We will call you when the sleep study results are ready - if you have not heard from us by 2 weeks from the date of the study, please call [phone] (ext 2).    You are advised to abstain from driving should you feel sleepy or drowsy.

## 2017-05-11 NOTE — MR AVS SNAPSHOT
[facility]  [address]  [place] [address]  Phone: [phone]  Fax: [phone]                  Luisana Chun   2017 1:00 PM   Office Visit    Description:  Female : 1945   Provider:  Russell Murdock MD   Department:  [facility]           Reason for Visit     Sleep Apnea           Diagnoses this Visit        Comments    KING (obstructive sleep apnea)    -  Primary     Morbid obesity due to excess calories         Insomnia, unspecified type                To Do List           Future Appointments        Provider Department Dept Phone    2017 2:20 PM Scott Dave MD [facility] [phone]      Goals (5 Years of Data)     None      Follow-Up and Disposition     Return after sleep study.      [facility] On Call     [facility] On Call Nurse Care Line -  Assistance  Unless otherwise directed by your provider, please contact [facility], our nurse care line that is available for  assistance.     Registered nurses in the [facility] On Call Center provide: appointment scheduling, clinical advisement, health education, and other advisory services.  Call: [phone] (toll free)               Medications           Message regarding Medications     Verify the changes and/or additions to your medication regime listed below are the same as discussed with your clinician today.  If any of these changes or additions are incorrect, please notify your healthcare provider.             Verify that the below list of medications is an accurate representation of the medications you are currently taking.  If none reported, the list may be blank. If incorrect, please contact your healthcare provider. Carry this list with you in case of emergency.           Current Medications     amlodipine (NORVASC) 10 MG tablet Take 1 tablet (10 mg total) by mouth once daily.    amoxicillin (AMOXIL) 500 MG Tab Take 1 tablet (500 mg total) by mouth every 12 (twelve)  "hours.    calcitRIOL (ROCALTROL) 0.5 MCG Cap Take 1 capsule (0.5 mcg total) by mouth once daily.    CHONDROITIN SULFATE A (CHONDROITIN SULFATE ORAL) Take by mouth once daily at 6am.     clindamycin (CLEOCIN) 150 MG capsule Take 1 capsule (150 mg total) by mouth 2 (two) times daily as needed.    clonazePAM (KLONOPIN) 1 MG tablet Take 1 tablet (1 mg total) by mouth nightly as needed.    diphenoxylate-atropine 2.5-0.025 mg (LOMOTIL) 2.5-0.025 mg per tablet TAKE TWO TABLETS BY MOUTH THREE TIMES DAILY AS NEEDED    ergocalciferol (ERGOCALCIFEROL) 50,000 unit Cap Take 1 capsule (50,000 Units total) by mouth once daily.    estradiol (ESTRACE) 0.5 MG tablet Take 1 tablet (0.5 mg total) by mouth once daily.    fluoxetine (PROZAC) 40 MG capsule Take 1 capsule (40 mg total) by mouth every morning.    fluticasone (FLONASE) 50 mcg/actuation nasal spray by Nasal route as needed.     furosemide (LASIX) 20 MG tablet Take 1 tablet (20 mg total) by mouth 2 (two) times daily.    hydrochlorothiazide (HYDRODIURIL) 25 MG tablet Take 1 tablet (25 mg total) by mouth once daily.    losartan (COZAAR) 100 MG tablet Take 1 tablet (100 mg total) by mouth once daily.    medroxyPROGESTERone (PROVERA) 2.5 MG tablet Take 1 tablet (2.5 mg total) by mouth once daily.    PROCTO-RAZA 1 % crpe once daily at 6am.     sodium bicarbonate 325 MG tablet Take 325 mg by mouth 4 (four) times daily.    trazodone (DESYREL) 50 MG tablet Take 1 tablet (50 mg total) by mouth every evening.    triamcinolone acetonide 0.1% (KENALOG) 0.1 % cream 1 application 2 (two) times daily. Apply to affected area    zolpidem (AMBIEN) 5 MG Tab Take 5 mg by mouth nightly as needed.           Clinical Reference Information           Your Vitals Were     BP Pulse Height Weight SpO2 BMI    149/82 81 5' 8" (1.727 m) 115.2 kg (253 lb 15.5 oz) 98% 38.62 kg/m2      Blood Pressure          Most Recent Value    BP  (!)  149/82      Allergies as of 5/11/2017     No Known Allergies    "   Immunizations Administered on Date of Encounter - 5/11/2017     None      Orders Placed During Today's Visit     Future Labs/Procedures Expected by Expires    Polysomnogram (CPAP will be added if patient meets diagnostic criteria.)  As directed 5/11/2018      Instructions    [place] or [place] will contact you to schedule your sleep study. Their number is [phone] (ext 1). The [facility] is located on 7th floor of the [facility].    We will call you when the sleep study results are ready - if you have not heard from us by 2 weeks from the date of the study, please call [phone] (ext 2).    You are advised to abstain from driving should you feel sleepy or drowsy.        Language Assistance Services     ATTENTION: Language assistance services are available, free of charge. Please call [phone].      ATENCIÓN: Si giselela chad, tiene a schuster disposición servicios gratuitos de asistencia lingüística. Llame al [phone].     CHÚ Ý: N?u b?n nói Ti?ng Vi?t, có các d?ch v? h? tr? ngôn ng? mi?n phí dành cho b?n. G?i s? [phone].         [facility] complies with applicable Federal civil rights laws and does not discriminate on the basis of race, color, national origin, age, disability, or sex.

## 2017-05-11 NOTE — PROGRESS NOTES
Luisana Chun  was seen as a new patient at the request of  Abraham Goff MD for the evaluation of  raheel.    CHIEF COMPLAINT:    Chief Complaint   Patient presents with    Sleep Apnea       HISTORY OF PRESENT ILLNESS: Luisana Chun is a 71 y.o. female is here for sleep evaluation.   Patient is interested in bariatric surgery.  Patient sleep alone and is not sure if she snores.  No witnessed apnea.  Feeling rested upon awake.  No parasomnia.  No cataplexy.  Patient was told to have raheel based sleep study in 2013 in [place].  Did not tolerate.  No weigh changes.      +frequent cramping upon awake.        Ryde Sleepiness Scale score during initial sleep evaluation was 3.    SLEEP ROUTINE:  Activity the hour prior to sleep: read    Bed partner:  alone  Time to bed:  11 pm   Lights off:  yes  Sleep onset latency:  20 minutes        Disruptions or awakenings:    3 times (no difficulty going back to sleep)    Wakeup time:      8:30 am   Perceived sleep quality:  rested       Daytime naps:      60 minutes (rested)  Weekend sleep routine:      11 pm to 9 am   Caffeine use: 2 mugs of coffee in am   exercise habit:   Gardening and minimal walking      PAST MEDICAL HISTORY:    Active Ambulatory Problems     Diagnosis Date Noted    Osteoporosis without pathological fracture 02/16/2016    Menopausal state 02/16/2016    CKD (chronic kidney disease), stage III 05/20/2016    Secondary hyperparathyroidism 05/20/2016    Essential hypertension 06/07/2016    Obesity, Class II, BMI 35-39.9 12/07/2016    Vitamin D deficiency 03/23/2017    Normocytic anemia 05/04/2017     Resolved Ambulatory Problems     Diagnosis Date Noted    Annual physical exam 02/16/2016    Sebaceous cyst 02/16/2016    Spinal axis tumor 06/10/2016    Bone lesion 06/30/2016    Cancer screening 07/05/2016    History of anemia due to chronic kidney disease 05/04/2017     Past Medical History:   Diagnosis Date    Hypertension     Kidney disease,  chronic, stage III (GFR 30-59 ml/min)     Obesity     Osteoporosis                 PAST SURGICAL HISTORY:    Past Surgical History:   Procedure Laterality Date    APPENDECTOMY      BACK SURGERY  february 2016    COLONOSCOPY      ESOPHAGOGASTRODUODENOSCOPY      KNEE SURGERY Bilateral     left toe Left     TONSILLECTOMY           FAMILY HISTORY:                Family History   Problem Relation Age of Onset    Hypertension Mother     Heart disease Mother     Macular degeneration Mother     Hypertension Father     Cataracts Father     Glaucoma Father     Macular degeneration Father     Glaucoma Brother     Macular degeneration Brother     No Known Problems Sister     No Known Problems Maternal Aunt     No Known Problems Maternal Uncle     No Known Problems Paternal Aunt     No Known Problems Paternal Uncle     Macular degeneration Maternal Grandmother     Macular degeneration Maternal Grandfather     Macular degeneration Paternal Grandmother     Glaucoma Paternal Grandfather     Macular degeneration Paternal Grandfather     Amblyopia Neg Hx     Blindness Neg Hx     Cancer Neg Hx     Diabetes Neg Hx     Retinal detachment Neg Hx     Strabismus Neg Hx     Stroke Neg Hx     Thyroid disease Neg Hx        SOCIAL HISTORY:          Tobacco:   History   Smoking Status    Former Smoker   Smokeless Tobacco    Never Used       alcohol use:    History   Alcohol Use    0.0 oz/week    0 Standard drinks or equivalent per week     Comment: social                 Occupation:  Real estate management    ALLERGIES:  Review of patient's allergies indicates:  No Known Allergies    CURRENT MEDICATIONS:    Current Outpatient Prescriptions   Medication Sig Dispense Refill    amlodipine (NORVASC) 10 MG tablet Take 1 tablet (10 mg total) by mouth once daily. (Patient taking differently: Take 5 mg by mouth once daily. ) 30 tablet 2    amoxicillin (AMOXIL) 500 MG Tab Take 1 tablet (500 mg total) by mouth  every 12 (twelve) hours. 20 tablet 0    calcitRIOL (ROCALTROL) 0.5 MCG Cap Take 1 capsule (0.5 mcg total) by mouth once daily. 90 capsule 0    CHONDROITIN SULFATE A (CHONDROITIN SULFATE ORAL) Take by mouth once daily at 6am.       clindamycin (CLEOCIN) 150 MG capsule Take 1 capsule (150 mg total) by mouth 2 (two) times daily as needed. 14 capsule 0    clonazePAM (KLONOPIN) 1 MG tablet Take 1 tablet (1 mg total) by mouth nightly as needed. 30 tablet 2    diphenoxylate-atropine 2.5-0.025 mg (LOMOTIL) 2.5-0.025 mg per tablet TAKE TWO TABLETS BY MOUTH THREE TIMES DAILY AS NEEDED 180 tablet 0    ergocalciferol (ERGOCALCIFEROL) 50,000 unit Cap Take 1 capsule (50,000 Units total) by mouth once daily. 5 capsule 0    estradiol (ESTRACE) 0.5 MG tablet Take 1 tablet (0.5 mg total) by mouth once daily. 30 tablet 11    fluoxetine (PROZAC) 40 MG capsule Take 1 capsule (40 mg total) by mouth every morning. 90 capsule 3    fluticasone (FLONASE) 50 mcg/actuation nasal spray by Nasal route as needed.       furosemide (LASIX) 20 MG tablet Take 1 tablet (20 mg total) by mouth 2 (two) times daily. (Patient taking differently: Take 20 mg by mouth once daily. ) 30 tablet 11    hydrochlorothiazide (HYDRODIURIL) 25 MG tablet Take 1 tablet (25 mg total) by mouth once daily. (Patient taking differently: Take 25 mg by mouth daily as needed. ) 90 tablet 1    losartan (COZAAR) 100 MG tablet Take 1 tablet (100 mg total) by mouth once daily. 90 tablet 2    medroxyPROGESTERone (PROVERA) 2.5 MG tablet Take 1 tablet (2.5 mg total) by mouth once daily. 30 tablet 11    PROCTO-RAZA 1 % crpe once daily at 6am.       sodium bicarbonate 325 MG tablet Take 325 mg by mouth 4 (four) times daily.      trazodone (DESYREL) 50 MG tablet Take 1 tablet (50 mg total) by mouth every evening. 90 tablet 2    triamcinolone acetonide 0.1% (KENALOG) 0.1 % cream 1 application 2 (two) times daily. Apply to affected area  1    zolpidem (AMBIEN) 5 MG Tab Take  "5 mg by mouth nightly as needed.       No current facility-administered medications for this visit.                   REVIEW OF SYSTEMS:     Sleep related symptoms as per HPI.  CONST:+ weight gain    HEENT:  +sinus congestion from uri  PULM: Denies dyspnea  CARD:  Denies palpitations   GI:  + acid reflux  : Denies polyuria; +nocturia  NEURO: + headaches  PSYCH: +depressed and anxious.  HEME: Denies anemia   Otherwise, a balance of systems reviewed is negative.          PHYSICAL EXAM:  Vitals:    05/11/17 1312   BP: (!) 149/82   Pulse: 81   SpO2: 98%   Weight: 115.2 kg (253 lb 15.5 oz)   Height: 5' 8" (1.727 m)   PainSc:   6   PainLoc: Mouth     Body mass index is 38.62 kg/(m^2).     GENERAL: Normal development, well groomed  HEENT:  Conjunctivae are non-erythematous; Pupils equal, round, and reactive to light; Nose is symmetrical; Nasal mucosa is pink and moist; Septum is midline; Inferior turbinates are normal; Nasal airflow is normal; Posterior pharynx is pink; Modified Mallampati: 3; Posterior palate is normal; Tonsils +1; Uvula is normal and pink;Tongue is normal; edentulous; No TMJ tenderness; Jaw opening and protrusion without click and without discomfort.  NECK: Supple. Neck circumference is 17 inches. No thyromegaly. No palpable nodes.     SKIN: On face and neck: No abrasions, no rashes, no lesions.  No subcutaneous nodules are palpable.  RESPIRATORY: Chest is clear to auscultation.  Normal chest expansion and non-labored breathing at rest.  CARDIOVASCULAR: Normal S1, S2.  No murmurs, gallops or rubs. No carotid bruits bilaterally.  EXTREMITIES: No edema. No clubbing. No cyanosis. Station normal. Gait normal.        NEURO/PSYCH: Oriented to time, place and person. Normal attention span and concentration. Affect is full. Mood is normal.                                              DATA no prior sleep study  Lab Results   Component Value Date    TSH 1.663 12/15/2016     ASSESSMENT    ICD-10-CM ICD-9-CM    1. " KING (obstructive sleep apnea) G47.33 327.23 Polysomnogram (CPAP will be added if patient meets diagnostic criteria.)   2. Morbid obesity due to excess calories E66.01 278.01    3. Insomnia, unspecified type G47.00 780.52        PLAN:    Sleep Apnea NEC. The patient symptomatically has elevated bmi, enlarged neck, high grade mallampatti, htn. This warrants further investigation for possible obstructive sleep apnea.  Patient will be contacted after sleep study is done.  Unable to recall name of lab of prior sleep study.  Patient believe facility has gone out of business.  Will bring back for requalification.   Will review sleep study over the phone if negative for king.      Obesity - planned for bariatric surgery.    Insomnia - maintenance is the issue.  May related to hyperarousal from untreated king.  Will monitor and address after sleep study.      Diagnostic: Polysomnogram. The nature of this procedure and its indication was discussed with the patient.     Education: During our discussion today, we talked about the etiology of obstructive sleep apnea as well as the potential ramifications of untreated sleep apnea, which could include daytime sleepiness, hypertension, heart disease and/or stroke.     Precautions: The patient was advised to abstain from driving should they feel sleepy or drowsy.       Thank you for allowing me the opportunity to participate in the care of your patient.    Patient will Return after sleep study.    Please cc note to  Abraham Goff MD.    This is 45 minutes visit, over 50% of time spent in direct consultation with patient.

## 2017-05-12 ENCOUNTER — HOSPITAL ENCOUNTER (OUTPATIENT)
Dept: RADIOLOGY | Facility: HOSPITAL | Age: 72
Discharge: HOME | End: 2017-05-12
Attending: FAMILY MEDICINE
Payer: MEDICARE

## 2017-05-12 ENCOUNTER — TELEPHONE (OUTPATIENT)
Dept: FAMILY MEDICINE | Facility: CLINIC | Age: 72
End: 2017-05-12

## 2017-05-12 ENCOUNTER — TELEPHONE (OUTPATIENT)
Dept: SLEEP MEDICINE | Facility: OTHER | Age: 72
End: 2017-05-12

## 2017-05-12 ENCOUNTER — OFFICE VISIT (OUTPATIENT)
Dept: FAMILY MEDICINE | Facility: CLINIC | Age: 72
End: 2017-05-12
Payer: MEDICARE

## 2017-05-12 VITALS
WEIGHT: 256 LBS | HEIGHT: 68 IN | TEMPERATURE: 99 F | OXYGEN SATURATION: 97 % | DIASTOLIC BLOOD PRESSURE: 100 MMHG | BODY MASS INDEX: 38.8 KG/M2 | SYSTOLIC BLOOD PRESSURE: 156 MMHG | HEART RATE: 87 BPM | RESPIRATION RATE: 16 BRPM

## 2017-05-12 DIAGNOSIS — N18.30 CKD (CHRONIC KIDNEY DISEASE), STAGE III: Primary | ICD-10-CM

## 2017-05-12 DIAGNOSIS — J20.8 ACUTE BRONCHITIS, VIRAL: ICD-10-CM

## 2017-05-12 DIAGNOSIS — R93.89 ABNORMAL CHEST X-RAY: Primary | ICD-10-CM

## 2017-05-12 DIAGNOSIS — I10 HYPERTENSION, UNCONTROLLED: ICD-10-CM

## 2017-05-12 PROCEDURE — 71020 XR CHEST PA AND LATERAL: CPT | Mod: 26,,, | Performed by: RADIOLOGY

## 2017-05-12 PROCEDURE — 99999 PR PBB SHADOW E&M-EST. PATIENT-LVL III: CPT | Mod: PBBFAC,,, | Performed by: FAMILY MEDICINE

## 2017-05-12 PROCEDURE — 71020 XR CHEST PA AND LATERAL: CPT | Mod: TC,PO

## 2017-05-12 PROCEDURE — 99214 OFFICE O/P EST MOD 30 MIN: CPT | Mod: S$PBB,,, | Performed by: FAMILY MEDICINE

## 2017-05-12 RX ORDER — PROMETHAZINE HYDROCHLORIDE AND DEXTROMETHORPHAN HYDROBROMIDE 6.25; 15 MG/5ML; MG/5ML
5 SYRUP ORAL NIGHTLY PRN
Qty: 120 ML | Refills: 0 | Status: SHIPPED | OUTPATIENT
Start: 2017-05-12 | End: 2017-05-26

## 2017-05-12 RX ORDER — VALSARTAN 320 MG/1
320 TABLET ORAL DAILY
Qty: 90 TABLET | Refills: 3 | Status: SHIPPED | OUTPATIENT
Start: 2017-05-12 | End: 2017-05-19

## 2017-05-12 NOTE — TELEPHONE ENCOUNTER
Notified patient of information below. Verbalized understanding. Attempt to call scheduling dept but unable to reach LM. Notified pt of LM but will follow up with scheduling Monday.

## 2017-05-12 NOTE — PROGRESS NOTES
Subjective:       Patient ID: Luisana Chun is a 71 y.o. female.    Chief Complaint: Results (follow up recent labs )    HPI    CRISTEL - resolved! Pt is doing much better than when I saw her last. She saw nephrology yesterday who advised her to discontinue hctz       Cough - onset 1 week ago - productive, sore throat, sinus pressure, chills, and ringing in both ears.     No relief with leandro-seltzer plus.     Sxs are unchanged since the onset.    HTN - pt has mistakenly been off ALL over BP meds since I saw her last. Her bPs, have been running high.     Current Outpatient Prescriptions on File Prior to Visit   Medication Sig Dispense Refill    [] amoxicillin (AMOXIL) 500 MG Tab Take 1 tablet (500 mg total) by mouth every 12 (twelve) hours. 20 tablet 0    calcitRIOL (ROCALTROL) 0.5 MCG Cap Take 1 capsule (0.5 mcg total) by mouth once daily. 90 capsule 0    CHONDROITIN SULFATE A (CHONDROITIN SULFATE ORAL) Take by mouth once daily at 6am.       clindamycin (CLEOCIN) 150 MG capsule Take 1 capsule (150 mg total) by mouth 2 (two) times daily as needed. 14 capsule 0    clonazePAM (KLONOPIN) 1 MG tablet Take 1 tablet (1 mg total) by mouth nightly as needed. 30 tablet 2    diphenoxylate-atropine 2.5-0.025 mg (LOMOTIL) 2.5-0.025 mg per tablet TAKE TWO TABLETS BY MOUTH THREE TIMES DAILY AS NEEDED 180 tablet 0    ergocalciferol (ERGOCALCIFEROL) 50,000 unit Cap Take 1 capsule (50,000 Units total) by mouth once daily. 5 capsule 0    estradiol (ESTRACE) 0.5 MG tablet Take 1 tablet (0.5 mg total) by mouth once daily. 30 tablet 11    fluoxetine (PROZAC) 40 MG capsule Take 1 capsule (40 mg total) by mouth every morning. 90 capsule 3    fluticasone (FLONASE) 50 mcg/actuation nasal spray by Nasal route as needed.       medroxyPROGESTERone (PROVERA) 2.5 MG tablet Take 1 tablet (2.5 mg total) by mouth once daily. 30 tablet 11    PROCTO-RAZA 1 % crpe once daily at 6am.       sodium bicarbonate 325 MG tablet Take 325 mg  by mouth 4 (four) times daily.      trazodone (DESYREL) 50 MG tablet Take 1 tablet (50 mg total) by mouth every evening. 90 tablet 2    triamcinolone acetonide 0.1% (KENALOG) 0.1 % cream 1 application 2 (two) times daily. Apply to affected area  1    zolpidem (AMBIEN) 5 MG Tab Take 5 mg by mouth nightly as needed.       No current facility-administered medications on file prior to visit.        Past Medical History:   Diagnosis Date    Hypertension     Kidney disease, chronic, stage III (GFR 30-59 ml/min)     Obesity     Osteoporosis        Family History   Problem Relation Age of Onset    Hypertension Mother     Heart disease Mother     Macular degeneration Mother     Hypertension Father     Cataracts Father     Glaucoma Father     Macular degeneration Father     Glaucoma Brother     Macular degeneration Brother     No Known Problems Sister     No Known Problems Maternal Aunt     No Known Problems Maternal Uncle     No Known Problems Paternal Aunt     No Known Problems Paternal Uncle     Macular degeneration Maternal Grandmother     Macular degeneration Maternal Grandfather     Macular degeneration Paternal Grandmother     Glaucoma Paternal Grandfather     Macular degeneration Paternal Grandfather     Amblyopia Neg Hx     Blindness Neg Hx     Cancer Neg Hx     Diabetes Neg Hx     Retinal detachment Neg Hx     Strabismus Neg Hx     Stroke Neg Hx     Thyroid disease Neg Hx         reports that she has quit smoking. She has never used smokeless tobacco. She reports that she drinks alcohol. She reports that she does not use illicit drugs.    Review of Systems   Constitutional: Negative for chills and fever.   Gastrointestinal: Negative for nausea and vomiting.   Genitourinary: Negative for hematuria.        No oliguria       Objective:     Vitals:    05/12/17 1421   BP: (!) 156/100   Pulse: 87   Resp: 16   Temp: 98.7 °F (37.1 °C)        Physical Exam   Constitutional: She appears  "well-developed. No distress.   HENT:   Head: Normocephalic and atraumatic.   Eyes: Conjunctivae are normal. No scleral icterus.   Pulmonary/Chest: Effort normal.   Neurological: She is alert.   Skin: She is not diaphoretic.   Psychiatric: She has a normal mood and affect. Her behavior is normal.   Vitals reviewed.      Assessment:       1. CKD (chronic kidney disease), stage III    2. Acute bronchitis, viral    3. Hypertension, uncontrolled        Plan:       Luisana was seen today for results.    Diagnoses and all orders for this visit:    CKD (chronic kidney disease), stage III  -     valsartan (DIOVAN) 320 MG tablet; Take 1 tablet (320 mg total) by mouth once daily.    Pts CRISTEL on CKI has improved --> CKD 3 - will restart losartan. Will discontinue hctz permanently.     Acute bronchitis, viral  -     promethazine-dextromethorphan (PROMETHAZINE-DM) 6.25-15 mg/5 mL Syrp; Take 5 mLs by mouth nightly as needed.  -     X-Ray Chest PA And Lateral; Future    Increase fluids and rest.  You body needs increased water but other beverages may aid in comfort.  You will know that you have had enough water to be hydrated when your urine is clear or at least a very pale yellow.  You may use Mucinex to help thin thick secretions to allow you to expel them but it only works if you drink more water. Nasal saline may help with removal of mucus as well.  Ibuprofen is preferred for aches and pains as well as fever reduction.  Zyrtec or Claritin or Allegra may help with some of the runny nose symptoms if you are having them.  If you do not have high blood pressure, then you may use a decongestant such as pseudoephedrine or one of the above medications that have the letter, "-D" following it.  Hot tea with honey can help with sore throats as the heat with reduce the inflammation and the honey will coat your throat to help it feel better. Prescription cough medicine should be used at night to aid in sleep.  Coughing during the day is the " body's way of removing the infectious agent; however, the prescription cough medicine may also be used for coughing fits during the day. Lastly, good hand washing and cough hygiene (cough into your elbow) will help prevent the spread of the illness.  A general rule is that you are no longer contagious once you have been without a fever for over 24 hours without requiring fever reducing medications.     HTN unc - as above, restarted valsartan after CRISTEL resolved.         Return in about 2 weeks (around 5/26/2017), or if symptoms worsen or fail to improve, for Hypertension, cristel lab check .        Pt verbalized understanding and agreed with our plan.

## 2017-05-12 NOTE — TELEPHONE ENCOUNTER
----- Message from Scott Dave MD sent at 5/12/2017  3:46 PM CDT -----  Please notify patient results are abnormal, as they see a possible lung nodule on th xray, and the radiologist is requesting a lung cat scan. We will help you schedule this. NO sign of pneumonia.  Thanks.

## 2017-05-12 NOTE — MR AVS SNAPSHOT
[place] [facility] [address] [place] [address]  Phone: [phone]  Fax: [phone]                  Luisana Chun   2017 2:20 PM   Office Visit    Description:  Female : 1945   Provider:  Scott Dave MD   Department:  [facility]           Reason for Visit     Results           Diagnoses this Visit        Comments    CKD (chronic kidney disease), stage III    -  Primary     Acute bronchitis, viral                To Do List           Future Appointments        Provider Department Dept Phone    2017 7:30 PM LAB, SLEEP [facility] [phone]      Goals (5 Years of Data)     None      Follow-Up and Disposition     Return in about 2 weeks (around 2017), or if symptoms worsen or fail to improve, for Hypertension, timo lab check .       These Medications        Disp Refills Start End    valsartan (DIOVAN) 320 MG tablet 90 tablet 3 2017    Take 1 tablet (320 mg total) by mouth once daily. - Oral    Pharmacy: [address] [place] Ph #: [phone]       promethazine-dextromethorphan (PROMETHAZINE-DM) 6.25-15 mg/5 mL Syrp 120 mL 0 2017     Take 5 mLs by mouth nightly as needed. - Oral    Pharmacy: [address] Ph #: [phone]         [facility]     [facility] Nurse Care Line -  Assistance  Unless otherwise directed by your provider, please contact [facility], our nurse care line that is available for 24/ assistance.     Registered nurses in the [facility] provide: appointment scheduling, clinical advisement, health education, and other advisory services.  Call: [phone] (toll free)               Medications           Message regarding Medications     Verify the changes and/or additions to your medication regime listed below are the same as discussed  with your clinician today.  If any of these changes or additions are incorrect, please notify your healthcare provider.        START taking these NEW medications        Refills    valsartan (DIOVAN) 320 MG tablet 3    Sig: Take 1 tablet (320 mg total) by mouth once daily.    Class: Normal    Route: Oral    promethazine-dextromethorphan (PROMETHAZINE-DM) 6.25-15 mg/5 mL Syrp 0    Sig: Take 5 mLs by mouth nightly as needed.    Class: Normal    Route: Oral      STOP taking these medications     furosemide (LASIX) 20 MG tablet Take 1 tablet (20 mg total) by mouth 2 (two) times daily.    hydrochlorothiazide (HYDRODIURIL) 25 MG tablet Take 1 tablet (25 mg total) by mouth once daily.    amlodipine (NORVASC) 10 MG tablet Take 1 tablet (10 mg total) by mouth once daily.    losartan (COZAAR) 100 MG tablet Take 1 tablet (100 mg total) by mouth once daily.           Verify that the below list of medications is an accurate representation of the medications you are currently taking.  If none reported, the list may be blank. If incorrect, please contact your healthcare provider. Carry this list with you in case of emergency.           Current Medications     amoxicillin (AMOXIL) 500 MG Tab Take 1 tablet (500 mg total) by mouth every 12 (twelve) hours.    calcitRIOL (ROCALTROL) 0.5 MCG Cap Take 1 capsule (0.5 mcg total) by mouth once daily.    CHONDROITIN SULFATE A (CHONDROITIN SULFATE ORAL) Take by mouth once daily at 6am.     clindamycin (CLEOCIN) 150 MG capsule Take 1 capsule (150 mg total) by mouth 2 (two) times daily as needed.    clonazePAM (KLONOPIN) 1 MG tablet Take 1 tablet (1 mg total) by mouth nightly as needed.    diphenoxylate-atropine 2.5-0.025 mg (LOMOTIL) 2.5-0.025 mg per tablet TAKE TWO TABLETS BY MOUTH THREE TIMES DAILY AS NEEDED    ergocalciferol (ERGOCALCIFEROL) 50,000 unit Cap Take 1 capsule (50,000 Units total) by mouth once daily.    estradiol (ESTRACE) 0.5 MG tablet Take 1 tablet (0.5 mg total) by mouth once  "daily.    fluoxetine (PROZAC) 40 MG capsule Take 1 capsule (40 mg total) by mouth every morning.    fluticasone (FLONASE) 50 mcg/actuation nasal spray by Nasal route as needed.     medroxyPROGESTERone (PROVERA) 2.5 MG tablet Take 1 tablet (2.5 mg total) by mouth once daily.    PROCTO-RAZA 1 % crpe once daily at 6am.     sodium bicarbonate 325 MG tablet Take 325 mg by mouth 4 (four) times daily.    trazodone (DESYREL) 50 MG tablet Take 1 tablet (50 mg total) by mouth every evening.    triamcinolone acetonide 0.1% (KENALOG) 0.1 % cream 1 application 2 (two) times daily. Apply to affected area    zolpidem (AMBIEN) 5 MG Tab Take 5 mg by mouth nightly as needed.    promethazine-dextromethorphan (PROMETHAZINE-DM) 6.25-15 mg/5 mL Syrp Take 5 mLs by mouth nightly as needed.    valsartan (DIOVAN) 320 MG tablet Take 1 tablet (320 mg total) by mouth once daily.           Clinical Reference Information           Your Vitals Were     BP Pulse Temp Resp Height Weight    156/100 (BP Location: Right arm, Patient Position: Sitting, BP Method: Manual) 87 98.7 °F (37.1 °C) (Oral) 16 5' 8" (1.727 m) 116.1 kg (255 lb 15.6 oz)    SpO2 BMI             97% 38.92 kg/m2         Blood Pressure          Most Recent Value    BP  (!)  156/100      Allergies as of 5/12/2017     No Known Allergies      Immunizations Administered on Date of Encounter - 5/12/2017     None      Orders Placed During Today's Visit     Future Labs/Procedures Expected by Expires    X-Ray Chest PA And Lateral  5/12/2017 5/12/2018      Language Assistance Services     ATTENTION: Language assistance services are available, free of charge. Please call [phone].      ATENCIÓN: Si giselela chad, tiene a schuster disposición servicios gratuitos de asistencia lingüística. Llame al [phone].     CHÚ Ý: N?u b?n nói Ti?ng Vi?t, có các d?ch v? h? tr? ngôn ng? mi?n phí dành cho b?n. G?i s? [phone].         [facility] complies with applicable Federal civil " rights laws and does not discriminate on the basis of race, color, national origin, age, disability, or sex.

## 2017-05-17 ENCOUNTER — TELEPHONE (OUTPATIENT)
Dept: FAMILY MEDICINE | Facility: CLINIC | Age: 72
End: 2017-05-17

## 2017-05-17 RX ORDER — CODEINE PHOSPHATE AND GUAIFENESIN 10; 100 MG/5ML; MG/5ML
5 SOLUTION ORAL EVERY 8 HOURS PRN
Qty: 180 ML | Refills: 0 | Status: SHIPPED | OUTPATIENT
Start: 2017-05-17 | End: 2017-05-27

## 2017-05-17 NOTE — TELEPHONE ENCOUNTER
Pt was seen on 5/12/17; she was prescribed promethazine-DM which is on backorder at the pharmacies; pt states OTC cough medication is not working; she is requesting another cough medicine be called in to pharmacy

## 2017-05-17 NOTE — TELEPHONE ENCOUNTER
Please inform pt that cough syrup was sent, it CANNOT be taken the SAME DAY as klonopin.    Thanks

## 2017-05-18 ENCOUNTER — TELEPHONE (OUTPATIENT)
Dept: FAMILY MEDICINE | Facility: CLINIC | Age: 72
End: 2017-05-18

## 2017-05-18 NOTE — TELEPHONE ENCOUNTER
----- Message from Jadaenrico Mg sent at 5/18/2017  1:49 PM CDT -----  Contact: [facility]   Pharmacist request to speak to Dr. Dave or the nurse regarding about pt's blood pressure. Pt is confuse as of what medication she should be taking. Please advise [phone]. Thanks!

## 2017-05-19 ENCOUNTER — OFFICE VISIT (OUTPATIENT)
Dept: FAMILY MEDICINE | Facility: CLINIC | Age: 72
End: 2017-05-19
Payer: MEDICARE

## 2017-05-19 VITALS
HEART RATE: 83 BPM | TEMPERATURE: 98 F | RESPIRATION RATE: 16 BRPM | SYSTOLIC BLOOD PRESSURE: 120 MMHG | BODY MASS INDEX: 38.49 KG/M2 | HEIGHT: 68 IN | OXYGEN SATURATION: 97 % | DIASTOLIC BLOOD PRESSURE: 70 MMHG | WEIGHT: 254 LBS

## 2017-05-19 DIAGNOSIS — I10 BENIGN ESSENTIAL HYPERTENSION: ICD-10-CM

## 2017-05-19 DIAGNOSIS — J06.9 UPPER RESPIRATORY TRACT INFECTION, UNSPECIFIED TYPE: ICD-10-CM

## 2017-05-19 DIAGNOSIS — N18.30 CKD (CHRONIC KIDNEY DISEASE), STAGE III: Primary | ICD-10-CM

## 2017-05-19 PROCEDURE — 99999 PR PBB SHADOW E&M-EST. PATIENT-LVL IV: CPT | Mod: PBBFAC,,, | Performed by: PHYSICIAN ASSISTANT

## 2017-05-19 PROCEDURE — 99214 OFFICE O/P EST MOD 30 MIN: CPT | Mod: S$PBB,,, | Performed by: PHYSICIAN ASSISTANT

## 2017-05-19 PROCEDURE — 99214 OFFICE O/P EST MOD 30 MIN: CPT | Mod: PBBFAC,PO | Performed by: PHYSICIAN ASSISTANT

## 2017-05-19 RX ORDER — FLUTICASONE PROPIONATE 50 MCG
1 SPRAY, SUSPENSION (ML) NASAL DAILY
Qty: 16 G | Refills: 0 | Status: SHIPPED | OUTPATIENT
Start: 2017-05-19 | End: 2017-06-21

## 2017-05-19 RX ORDER — IRBESARTAN 300 MG/1
300 TABLET ORAL NIGHTLY
Qty: 30 TABLET | Refills: 0 | Status: SHIPPED | OUTPATIENT
Start: 2017-05-19 | End: 2017-06-07 | Stop reason: SDUPTHER

## 2017-05-19 RX ORDER — CETIRIZINE HYDROCHLORIDE 10 MG/1
10 TABLET ORAL DAILY
Qty: 30 TABLET | Refills: 0 | Status: SHIPPED | OUTPATIENT
Start: 2017-05-19 | End: 2017-06-21

## 2017-05-19 NOTE — PROGRESS NOTES
Subjective:       Patient ID: Luisana Chun is a 71 y.o. female.    Chief Complaint: Leg Swelling and Cough (cough med sent in by Dr Dave)    Cough   This is a new problem. The current episode started 1 to 4 weeks ago. The problem has been unchanged. The cough is non-productive. Associated symptoms include ear congestion, shortness of breath and wheezing. Pertinent negatives include no chest pain, fever, nasal congestion or rhinorrhea. Risk factors for lung disease include smoking/tobacco exposure. She has tried OTC cough suppressant and prescription cough suppressant for the symptoms. Her past medical history is significant for bronchitis. There is no history of asthma or pneumonia.   leg swelling: over a year. Contributes it to ACE-I and ARBs. Recently started on valsartan. States swelling worsened. States follows low salt diet and wears compression stockings. Denies dyspnea on exertion, orthopnea.     Review of Systems   Constitutional: Negative for fever.   HENT: Negative for rhinorrhea.    Respiratory: Positive for cough, shortness of breath and wheezing.    Cardiovascular: Negative for chest pain.       Objective:      Physical Exam   Constitutional: She is oriented to person, place, and time. She appears well-developed. No distress.   obese   Cardiovascular: Normal rate and regular rhythm.  Exam reveals no friction rub.    No murmur heard.  Pulmonary/Chest: Effort normal and breath sounds normal. No respiratory distress. She has no wheezes. She has no rales.   Musculoskeletal:   2+ pitting edema of bilateral lower extremities    Neurological: She is alert and oriented to person, place, and time.   Skin: Skin is warm and dry. She is not diaphoretic.   Psychiatric: She has a normal mood and affect. Her behavior is normal.       Assessment:       1. CKD (chronic kidney disease), stage III    2. Benign essential hypertension    3. Upper respiratory tract infection, unspecified type        Plan:          Luisana was seen today for leg swelling and cough.    Diagnoses and all orders for this visit:    CKD (chronic kidney disease), stage III  -     irbesartan (AVAPRO) 300 MG tablet; Take 1 tablet (300 mg total) by mouth every evening.  -     Stop valsartan, f/u next week    Benign essential hypertension  -     irbesartan (AVAPRO) 300 MG tablet; Take 1 tablet (300 mg total) by mouth every evening.    Upper respiratory tract infection, unspecified type  -     cetirizine (ZYRTEC) 10 MG tablet; Take 1 tablet (10 mg total) by mouth once daily.  -     fluticasone (FLONASE) 50 mcg/actuation nasal spray; 1 spray by Each Nare route once daily.  -     cough meds sent in, advised do not take with klonopin

## 2017-05-19 NOTE — MR AVS SNAPSHOT
[facility]  [address] [place] [address]  Phone: [phone]  Fax: [phone]                  Luisana Chun   2017 3:20 PM   Office Visit    Description:  Female : 1945   Provider:  Marie Cadena PA-C   Department:  [facility]           Reason for Visit     Leg Swelling     Cough           Diagnoses this Visit        Comments    CKD (chronic kidney disease), stage III    -  Primary     Benign essential hypertension         Upper respiratory tract infection, unspecified type                To Do List           Future Appointments        Provider Department Dept Phone    2017 2:40 PM Scott Dave MD [facility] [phone]    2017 5:00 PM WB CT2 LIMIT [address] [facility] [phone]    2017 7:30 PM LAB, SLEEP [facility] [phone]    2017 3:30 PM Natasha Roman RD [facility] [phone]      Goals (5 Years of Data)     None       These Medications        Disp Refills Start End    irbesartan (AVAPRO) 300 MG tablet 30 tablet 0 2017    Take 1 tablet (300 mg total) by mouth every evening. - Oral    Pharmacy: [address] Ph #: [phone]       cetirizine (ZYRTEC) 10 MG tablet 30 tablet 0 2017    Take 1 tablet (10 mg total) by mouth once daily. - Oral    Pharmacy: [address] Ph #: [phone]       fluticasone (FLONASE) 50 mcg/actuation nasal spray 16 g 0 2017     1 spray by Each Nare route once daily. - Each Nare    Pharmacy: [address] Ph #: [phone]         [facility]     [facility] Nurse Care Line -  Assistance  Unless otherwise directed by your provider, please contact [facility], our nurse care line that is available  for  assistance.     Registered nurses in the [facility] provide: appointment scheduling, clinical advisement, health education, and other advisory services.  Call: [phone] (toll free)               Medications           Message regarding Medications     Verify the changes and/or additions to your medication regime listed below are the same as discussed with your clinician today.  If any of these changes or additions are incorrect, please notify your healthcare provider.        START taking these NEW medications        Refills    irbesartan (AVAPRO) 300 MG tablet 0    Sig: Take 1 tablet (300 mg total) by mouth every evening.    Class: Normal    Route: Oral    cetirizine (ZYRTEC) 10 MG tablet 0    Sig: Take 1 tablet (10 mg total) by mouth once daily.    Class: Normal    Route: Oral    fluticasone (FLONASE) 50 mcg/actuation nasal spray 0    Si spray by Each Nare route once daily.    Class: Normal    Route: Each Nare      STOP taking these medications     PROCTO-RAZA 1 % crpe once daily at 6am.     valsartan (DIOVAN) 320 MG tablet Take 1 tablet (320 mg total) by mouth once daily.           Verify that the below list of medications is an accurate representation of the medications you are currently taking.  If none reported, the list may be blank. If incorrect, please contact your healthcare provider. Carry this list with you in case of emergency.           Current Medications     calcitRIOL (ROCALTROL) 0.5 MCG Cap Take 1 capsule (0.5 mcg total) by mouth once daily.    cetirizine (ZYRTEC) 10 MG tablet Take 1 tablet (10 mg total) by mouth once daily.    CHONDROITIN SULFATE A (CHONDROITIN SULFATE ORAL) Take by mouth once daily at 6am.     clonazePAM (KLONOPIN) 1 MG tablet Take 1 tablet (1 mg total) by mouth nightly as needed.    ergocalciferol (ERGOCALCIFEROL) 50,000 unit Cap Take 1 capsule (50,000 Units total) by mouth once daily.    estradiol (ESTRACE) 0.5 MG tablet Take 1 tablet (0.5 mg total)  "by mouth once daily.    fluoxetine (PROZAC) 40 MG capsule Take 1 capsule (40 mg total) by mouth every morning.    fluticasone (FLONASE) 50 mcg/actuation nasal spray 1 spray by Each Nare route once daily.    guaifenesin-codeine 100-10 mg/5 ml (TUSSI-ORGANIDIN NR)  mg/5 mL syrup Take 5 mLs by mouth every 8 (eight) hours as needed for Cough.    irbesartan (AVAPRO) 300 MG tablet Take 1 tablet (300 mg total) by mouth every evening.    medroxyPROGESTERone (PROVERA) 2.5 MG tablet Take 1 tablet (2.5 mg total) by mouth once daily.    promethazine-dextromethorphan (PROMETHAZINE-DM) 6.25-15 mg/5 mL Syrp Take 5 mLs by mouth nightly as needed.    sodium bicarbonate 325 MG tablet Take 325 mg by mouth 4 (four) times daily.    trazodone (DESYREL) 50 MG tablet Take 1 tablet (50 mg total) by mouth every evening.    triamcinolone acetonide 0.1% (KENALOG) 0.1 % cream 1 application 2 (two) times daily. Apply to affected area    zolpidem (AMBIEN) 5 MG Tab Take 5 mg by mouth nightly as needed.           Clinical Reference Information           Your Vitals Were     BP Pulse Temp Resp Height Weight    120/70 (BP Location: Left arm, Patient Position: Sitting, BP Method: Manual) 83 98.4 °F (36.9 °C) (Oral) 16 5' 8" (1.727 m) 115.2 kg (253 lb 15.5 oz)    SpO2 BMI             97% 38.62 kg/m2         Blood Pressure          Most Recent Value    BP  120/70      Allergies as of 5/19/2017     No Known Allergies      Immunizations Administered on Date of Encounter - 5/19/2017     None      Language Assistance Services     ATTENTION: Language assistance services are available, free of charge. Please call [phone].      ATENCIÓN: Si habla chad, tiene a schuster disposición servicios gratuitos de asistencia lingüística. Llame al [phone].     CHÚ Ý: N?u b?n nói Ti?ng Vi?t, có các d?ch v? h? tr? ngôn ng? mi?n phí dành cho b?n. G?i s? [phone].         [facility] complies with applicable Federal civil rights laws and " does not discriminate on the basis of race, color, national origin, age, disability, or sex.

## 2017-05-26 ENCOUNTER — HOSPITAL ENCOUNTER (OUTPATIENT)
Dept: SLEEP MEDICINE | Facility: HOSPITAL | Age: 72
Discharge: HOME | End: 2017-05-26
Attending: INTERNAL MEDICINE
Payer: MEDICARE

## 2017-05-26 ENCOUNTER — OFFICE VISIT (OUTPATIENT)
Dept: FAMILY MEDICINE | Facility: CLINIC | Age: 72
End: 2017-05-26
Payer: MEDICARE

## 2017-05-26 ENCOUNTER — HOSPITAL ENCOUNTER (OUTPATIENT)
Dept: RADIOLOGY | Facility: HOSPITAL | Age: 72
Discharge: HOME | End: 2017-05-26
Attending: FAMILY MEDICINE
Payer: MEDICARE

## 2017-05-26 VITALS
SYSTOLIC BLOOD PRESSURE: 100 MMHG | BODY MASS INDEX: 37.72 KG/M2 | RESPIRATION RATE: 20 BRPM | HEIGHT: 68 IN | TEMPERATURE: 99 F | WEIGHT: 248.88 LBS | HEART RATE: 82 BPM | OXYGEN SATURATION: 96 % | DIASTOLIC BLOOD PRESSURE: 60 MMHG

## 2017-05-26 DIAGNOSIS — R93.89 ABNORMAL CHEST X-RAY: ICD-10-CM

## 2017-05-26 DIAGNOSIS — R30.0 DYSURIA: ICD-10-CM

## 2017-05-26 DIAGNOSIS — N17.9 AKI (ACUTE KIDNEY INJURY): Primary | ICD-10-CM

## 2017-05-26 DIAGNOSIS — G47.33 OSA (OBSTRUCTIVE SLEEP APNEA): ICD-10-CM

## 2017-05-26 DIAGNOSIS — I10 HYPERTENSION, WELL CONTROLLED: ICD-10-CM

## 2017-05-26 LAB
BACTERIA #/AREA URNS AUTO: ABNORMAL /HPF
BILIRUB SERPL-MCNC: NORMAL MG/DL
BILIRUB UR QL STRIP: NEGATIVE
BLOOD URINE, POC: NORMAL
CLARITY UR REFRACT.AUTO: ABNORMAL
COLOR UR AUTO: ABNORMAL
COLOR, POC UA: YELLOW
GLUCOSE UR QL STRIP: NEGATIVE
GLUCOSE UR QL STRIP: NORMAL
HGB UR QL STRIP: ABNORMAL
HYALINE CASTS UR QL AUTO: 0 /LPF
KETONES UR QL STRIP: NEGATIVE
KETONES UR QL STRIP: NORMAL
LEUKOCYTE ESTERASE UR QL STRIP: ABNORMAL
LEUKOCYTE ESTERASE URINE, POC: NORMAL
MICROSCOPIC COMMENT: ABNORMAL
NITRITE UR QL STRIP: NEGATIVE
NITRITE, POC UA: NORMAL
PH UR STRIP: 5 [PH] (ref 5–8)
PH, POC UA: 5
PROT UR QL STRIP: ABNORMAL
PROTEIN, POC: NORMAL
RBC #/AREA URNS AUTO: 20 /HPF (ref 0–4)
SP GR UR STRIP: 1.01 (ref 1–1.03)
SPECIFIC GRAVITY, POC UA: 1.01
SQUAMOUS #/AREA URNS AUTO: 1 /HPF
URN SPEC COLLECT METH UR: ABNORMAL
UROBILINOGEN UR STRIP-ACNC: NEGATIVE EU/DL
UROBILINOGEN, POC UA: NORMAL
WBC #/AREA URNS AUTO: >100 /HPF (ref 0–5)

## 2017-05-26 PROCEDURE — 99214 OFFICE O/P EST MOD 30 MIN: CPT | Mod: S$PBB,,, | Performed by: FAMILY MEDICINE

## 2017-05-26 PROCEDURE — 71250 CT THORAX DX C-: CPT | Mod: 26,GC,, | Performed by: RADIOLOGY

## 2017-05-26 PROCEDURE — 99999 PR PBB SHADOW E&M-EST. PATIENT-LVL III: CPT | Mod: PBBFAC,,, | Performed by: FAMILY MEDICINE

## 2017-05-26 PROCEDURE — 71250 CT THORAX DX C-: CPT | Mod: TC

## 2017-05-26 RX ORDER — SULFAMETHOXAZOLE AND TRIMETHOPRIM 800; 160 MG/1; MG/1
1 TABLET ORAL 2 TIMES DAILY
Qty: 6 TABLET | Refills: 0 | Status: SHIPPED | OUTPATIENT
Start: 2017-05-26 | End: 2017-05-29

## 2017-05-26 RX ORDER — LISINOPRIL 20 MG/1
20 TABLET ORAL
COMMUNITY
Start: 2017-03-28 | End: 2017-05-26

## 2017-05-26 NOTE — PROGRESS NOTES
Subjective:       Patient ID: Luisana Chun is a 71 y.o. female.    Chief Complaint: Hypertension (follow up ) and CRISTEL (recheck labs )    HPI    Htn - Much improved! She stopped valsartan due to perceived ankle swelling, and is now on irbesartan and is doing well. She is adherent with her meds without complaints. No refills needed. No complaints.     CRISTEL - Pt is doing well. Adequate urine production. She is back on ARB. She does have some pedal edema.     UTI - pt notes dysuria x 5 days with frequency, hesitancy that is worsening since the onset. No fevers. + CHILLS    Current Outpatient Prescriptions on File Prior to Visit   Medication Sig Dispense Refill    calcitRIOL (ROCALTROL) 0.5 MCG Cap Take 1 capsule (0.5 mcg total) by mouth once daily. 90 capsule 0    cetirizine (ZYRTEC) 10 MG tablet Take 1 tablet (10 mg total) by mouth once daily. 30 tablet 0    CHONDROITIN SULFATE A (CHONDROITIN SULFATE ORAL) Take by mouth once daily at 6am.       clonazePAM (KLONOPIN) 1 MG tablet Take 1 tablet (1 mg total) by mouth nightly as needed. 30 tablet 2    ergocalciferol (ERGOCALCIFEROL) 50,000 unit Cap Take 1 capsule (50,000 Units total) by mouth once daily. 5 capsule 0    estradiol (ESTRACE) 0.5 MG tablet Take 1 tablet (0.5 mg total) by mouth once daily. 30 tablet 11    fluoxetine (PROZAC) 40 MG capsule Take 1 capsule (40 mg total) by mouth every morning. (Patient taking differently: Take 40 mg by mouth once daily. ) 90 capsule 3    fluticasone (FLONASE) 50 mcg/actuation nasal spray 1 spray by Each Nare route once daily. 16 g 0    irbesartan (AVAPRO) 300 MG tablet Take 1 tablet (300 mg total) by mouth every evening. 30 tablet 0    medroxyPROGESTERone (PROVERA) 2.5 MG tablet Take 1 tablet (2.5 mg total) by mouth once daily. 30 tablet 11    sodium bicarbonate 325 MG tablet Take 325 mg by mouth 4 (four) times daily.      trazodone (DESYREL) 50 MG tablet Take 1 tablet (50 mg total) by mouth every evening. 90  tablet 2    triamcinolone acetonide 0.1% (KENALOG) 0.1 % cream 1 application 2 (two) times daily. Apply to affected area  1    zolpidem (AMBIEN) 5 MG Tab Take 5 mg by mouth nightly as needed.       No current facility-administered medications on file prior to visit.        Past Medical History:   Diagnosis Date    Hypertension     Kidney disease, chronic, stage III (GFR 30-59 ml/min)     Obesity     Osteoporosis        Family History   Problem Relation Age of Onset    Hypertension Mother     Heart disease Mother     Macular degeneration Mother     Hypertension Father     Cataracts Father     Glaucoma Father     Macular degeneration Father     Glaucoma Brother     Macular degeneration Brother     No Known Problems Sister     No Known Problems Maternal Aunt     No Known Problems Maternal Uncle     No Known Problems Paternal Aunt     No Known Problems Paternal Uncle     Macular degeneration Maternal Grandmother     Macular degeneration Maternal Grandfather     Macular degeneration Paternal Grandmother     Glaucoma Paternal Grandfather     Macular degeneration Paternal Grandfather     Amblyopia Neg Hx     Blindness Neg Hx     Cancer Neg Hx     Diabetes Neg Hx     Retinal detachment Neg Hx     Strabismus Neg Hx     Stroke Neg Hx     Thyroid disease Neg Hx         reports that she has quit smoking. She has never used smokeless tobacco. She reports that she drinks alcohol. She reports that she does not use drugs.    Review of Systems   Constitutional: Positive for chills. Negative for fever.   Genitourinary: Positive for dysuria, frequency and urgency. Negative for decreased urine volume, vaginal discharge and vaginal pain.       Objective:     Vitals:    05/26/17 1450   BP: 100/60   Pulse: 82   Resp: 20   Temp: 98.7 °F (37.1 °C)        Physical Exam   Constitutional: She appears well-developed. No distress.   O   HENT:   Head: Normocephalic and atraumatic.   Eyes: Conjunctivae are normal.  Right eye exhibits no discharge. Left eye exhibits no discharge. No scleral icterus.   Cardiovascular: Normal rate, regular rhythm and normal heart sounds.  Exam reveals no gallop and no friction rub.    No murmur heard.  Pulmonary/Chest: Effort normal and breath sounds normal. No respiratory distress. She has no wheezes. She has no rales.   Musculoskeletal: She exhibits edema (1 + on L and trace on R).   Neurological: She is alert.   Skin: She is not diaphoretic.   Psychiatric: She has a normal mood and affect.   Vitals reviewed.      Assessment:       1. CRISTEL (acute kidney injury)    2. Hypertension, well controlled    3. Dysuria        Plan:       Luisana was seen today for hypertension and cristel.    Diagnoses and all orders for this visit:    CRISTEL (acute kidney injury)  -     Renal function panel; Future  Resolved - will check labs today.     Hypertension, well controlled  Doing well on irbesartan. Continue this and checking Bp at home.     Dysuria  -     POCT urinalysis, dipstick or tablet reag  -     Urine culture  -     Urinalysis    UTI  -     sulfamethoxazole-trimethoprim 800-160mg (BACTRIM DS) 800-160 mg Tab; Take 1 tablet by mouth 2 (two) times daily.  New dx - pt educated on disease etiology, prognosis and treatment. Answered pts questions.            Return in about 3 months (around 8/26/2017) for ckd 3.        Pt verbalized understanding and agreed with our plan.

## 2017-05-27 ENCOUNTER — HOSPITAL ENCOUNTER (EMERGENCY)
Facility: HOSPITAL | Age: 72
Discharge: HOME | End: 2017-05-27
Attending: EMERGENCY MEDICINE
Payer: MEDICARE

## 2017-05-27 VITALS
HEIGHT: 69 IN | DIASTOLIC BLOOD PRESSURE: 79 MMHG | SYSTOLIC BLOOD PRESSURE: 142 MMHG | TEMPERATURE: 98 F | WEIGHT: 240 LBS | OXYGEN SATURATION: 97 % | BODY MASS INDEX: 35.55 KG/M2 | HEART RATE: 94 BPM | RESPIRATION RATE: 18 BRPM

## 2017-05-27 DIAGNOSIS — Z91.038 ALLERGY TO ANT BITE: Primary | ICD-10-CM

## 2017-05-27 PROCEDURE — 99283 EMERGENCY DEPT VISIT LOW MDM: CPT

## 2017-05-27 NOTE — ED PROVIDER NOTES
Encounter Date: 5/27/2017    SCRIBE #1 NOTE: I, Анна Peters, am scribing for, and in the presence of,  Chalo Samayoa MD. I have scribed the following portions of the note - Other sections scribed: HPI, ROS, PE.       History     Chief Complaint   Patient presents with    Insect Bite     Pt reportedly got bit by multiple fire ants tonight. Note whelps on chin, right shoulder, right forearm and right upper inner arm, right posterior upper arm, bilateral legs and right trunk/flank area     Review of patient's allergies indicates:  No Known Allergies  CC: Insect Bites    HPI: 71 year old female with medical history of HTN, chronic kidney disease stage III, and osteoporosis presents to the ED for an evaluation of multiple fire ant bites to bilateral arms, right chin, inner thigh, bilateral legs, feet, and right hip. Patient was having a sleep study performed upstairs when she was getting bit. Patient came to the ED to request antihistamines. There were multiple ants (more than 10) noted crawling from her purse on the ground. She reports checking her purse and states there is no food. Patient denies fever and shortness of breath. She reports no further symptoms.      The history is provided by the patient. No  was used.     Past Medical History:   Diagnosis Date    Hypertension     Kidney disease, chronic, stage III (GFR 30-59 ml/min)     Obesity     Osteoporosis      Past Surgical History:   Procedure Laterality Date    APPENDECTOMY      BACK SURGERY  february 2016    COLONOSCOPY      ESOPHAGOGASTRODUODENOSCOPY      KNEE SURGERY Bilateral     left toe Left     TONSILLECTOMY       Family History   Problem Relation Age of Onset    Hypertension Mother     Heart disease Mother     Macular degeneration Mother     Hypertension Father     Cataracts Father     Glaucoma Father     Macular degeneration Father     Glaucoma Brother     Macular degeneration Brother     No Known Problems Sister      No Known Problems Maternal Aunt     No Known Problems Maternal Uncle     No Known Problems Paternal Aunt     No Known Problems Paternal Uncle     Macular degeneration Maternal Grandmother     Macular degeneration Maternal Grandfather     Macular degeneration Paternal Grandmother     Glaucoma Paternal Grandfather     Macular degeneration Paternal Grandfather     Amblyopia Neg Hx     Blindness Neg Hx     Cancer Neg Hx     Diabetes Neg Hx     Retinal detachment Neg Hx     Strabismus Neg Hx     Stroke Neg Hx     Thyroid disease Neg Hx      Social History   Substance Use Topics    Smoking status: Former Smoker    Smokeless tobacco: Never Used    Alcohol use 0.0 oz/week      Comment: social     Review of Systems   Constitutional: Negative for chills, diaphoresis and fever.   HENT: Negative for ear pain and sore throat.    Eyes: Negative for photophobia and visual disturbance.   Respiratory: Negative for cough and shortness of breath.    Cardiovascular: Negative for chest pain.   Gastrointestinal: Negative for abdominal pain, diarrhea, nausea and vomiting.   Genitourinary: Negative for dysuria.   Musculoskeletal: Negative for back pain.   Skin: Positive for wound (insect bites). Negative for rash.   Neurological: Negative for headaches.       Physical Exam     Initial Vitals [05/27/17 0336]   BP Pulse Resp Temp SpO2   (!) 142/79 94 18 98.3 °F (36.8 °C) 97 %     Physical Exam    Nursing note and vitals reviewed.  Constitutional: She appears well-developed and well-nourished.   HENT:   Head: Normocephalic and atraumatic.   Nose: Nose normal.   Eyes: EOM and lids are normal.   Neck: Neck supple.   Pulmonary/Chest: No respiratory distress.   Abdominal: Normal appearance.   Musculoskeletal: Normal range of motion.   Neurological: She is alert.   Skin: Skin is warm and dry.   Insect bites with local hives   Psychiatric: She has a normal mood and affect. Her behavior is normal. Thought content normal.          ED Course   Procedures  Labs Reviewed - No data to display          Medical Decision Making:   History:   Old Medical Records: I decided to obtain old medical records.  ED Management:  Ant bites with local allergic reaction.  No evidence of stridor or anaphylaxis.    Pt drove herself.  No meds here.  She will take antihistamines at home.            Scribe Attestation:   Scribe #1: I performed the above scribed service and the documentation accurately describes the services I performed. I attest to the accuracy of the note.    Attending Attestation:           Physician Attestation for Scribe:  Physician Attestation Statement for Scribe #1: I, Chalo Samayoa MD, reviewed documentation, as scribed by Анна Peters in my presence, and it is both accurate and complete.                 ED Course     Clinical Impression:   The encounter diagnosis was Allergy to ant bite.    Disposition:   Disposition: Discharged  Condition: Stable       Chalo Samayoa MD  05/27/17 6453

## 2017-05-27 NOTE — ED TRIAGE NOTES
"Pt reports she was upstairs having a sleep study performed when she was bit by multiple fire ants.  Note whelps on chin, right shoulder, right forearm and right upper inner arm, right posterior upper arm, bilateral legs and right trunk/flank area.  Pt is requesting antihistamine so she can go home.  Pt was sitting in chair in the room holding her purse.  Multiple ants were crawling around her purse, ants were also on the ground.  Pt states, "I do not have any food in my purse."  "

## 2017-05-27 NOTE — ED NOTES
"After putting pt in room 8, the patient placed her purse on the bed. I noted >10 ants crawling from her purse onto the bed. I quickly removed the ants from the sheet covering the stretcher and placed her purse on the counter, again noting ants crawling out of her purse onto the cabinet. The patient stated, "I don't have any food in my purse, I don't know why there would be ants in there, other than I had my purse on the floor upstairs". Supervisor notified.  "

## 2017-05-27 NOTE — PROCEDURES
In lab over night PSG was performed on Luisana Chun [facility] 1945. The procedure was explained and questions were answered prior to start of study.    Patient education was performed.     The lowest oxygen saturation observed during sleep was 87%    EKG:  The EKG appeared to be sinus tachycardia with Rare PACs.    Technical difficulties during the study: Test ended at 03:27 AM when patient called out for Tech to report environmental issue. Pt was transfered to ED via wheel chair per RN supervisor for ant bites.

## 2017-05-29 ENCOUNTER — TELEPHONE (OUTPATIENT)
Dept: NEPHROLOGY | Facility: CLINIC | Age: 72
End: 2017-05-29

## 2017-05-29 DIAGNOSIS — N39.0 E. COLI UTI: Primary | ICD-10-CM

## 2017-05-29 DIAGNOSIS — B96.20 E. COLI UTI: Primary | ICD-10-CM

## 2017-05-29 LAB — BACTERIA UR CULT: NORMAL

## 2017-05-29 RX ORDER — CIPROFLOXACIN 500 MG/1
500 TABLET ORAL 2 TIMES DAILY
Qty: 14 TABLET | Refills: 0 | Status: SHIPPED | OUTPATIENT
Start: 2017-05-29 | End: 2017-06-21

## 2017-05-29 NOTE — TELEPHONE ENCOUNTER
Recent labs including renal panel reviewed. Chart reviewed. She appears to have elevated creatinine per labs from 5/26/17, electrolytes are stable. Noted she had recent visit with PCP who obtained urinalysis given her symptoms of UTI. Urinalysis noted, consistent with UTI. Urine culture as obtained by PCP noted for E. Coli. However it appears to be resistant to bactrim that she has been started recently.    Suggest  - stop bactrim as per urine culture there is resistance to bactrim and it can potentially cause hyperkalemia, interstitial nephritis  - start Cipro per most recent urine   - she will need repeat renal panel, urinalysis and urine culture in around 1 week to follow on her creatinine, electrolytes and confirm resolution of her UTI    Elevated creatinine from labs from 5/26/17 and corresponding urine study showing overwhelming UTI, CRISTEL on CKD III most likely due to E. Coli UTI.

## 2017-05-30 ENCOUNTER — TELEPHONE (OUTPATIENT)
Dept: FAMILY MEDICINE | Facility: CLINIC | Age: 72
End: 2017-05-30

## 2017-05-30 NOTE — TELEPHONE ENCOUNTER
..Patient notified of results as noted below by MD, patient verbalized understanding. Patient stated that she still have burning, sxs were not getting any better, but her nephrologist prescribed cipro for for the UTI on 5/29/17

## 2017-05-30 NOTE — TELEPHONE ENCOUNTER
----- Message from Scott Dave MD sent at 5/29/2017  5:17 PM CDT -----  Please notify patient results show that she does have a UTI caused by E. Coli. Are her symptoms improving on the current medication?

## 2017-05-31 ENCOUNTER — TELEPHONE (OUTPATIENT)
Dept: FAMILY MEDICINE | Facility: CLINIC | Age: 72
End: 2017-05-31

## 2017-05-31 DIAGNOSIS — J01.90 ACUTE BACTERIAL SINUSITIS: Primary | ICD-10-CM

## 2017-05-31 DIAGNOSIS — B96.89 ACUTE BACTERIAL SINUSITIS: Primary | ICD-10-CM

## 2017-05-31 RX ORDER — AZITHROMYCIN 250 MG/1
TABLET, FILM COATED ORAL
Qty: 6 TABLET | Refills: 0 | Status: SHIPPED | OUTPATIENT
Start: 2017-05-31 | End: 2017-06-21

## 2017-05-31 NOTE — TELEPHONE ENCOUNTER
Pt states she is having dizziness, feeling weak, water in left ear, headache, cough and chills; she is requesting zpak; please advise

## 2017-05-31 NOTE — TELEPHONE ENCOUNTER
----- Message from Lacy Smith sent at 5/31/2017 10:37 AM CDT -----  Contact: Self   Patient says she is sick in bed and would like to know if she can get a Zpack . Please call patient at  [phone].

## 2017-05-31 NOTE — TELEPHONE ENCOUNTER
Pt with three weeks of cough, chills dizziness. Evaluated her x 2 and treated for allergies/URi. Will send zpack today for possible bacterial element such michelle sinusitis.

## 2017-06-02 ENCOUNTER — TELEPHONE (OUTPATIENT)
Dept: FAMILY MEDICINE | Facility: CLINIC | Age: 72
End: 2017-06-02

## 2017-06-02 ENCOUNTER — TELEPHONE (OUTPATIENT)
Dept: NEPHROLOGY | Facility: HOSPITAL | Age: 72
End: 2017-06-02

## 2017-06-02 NOTE — TELEPHONE ENCOUNTER
----- Message from Loreto Smith sent at 6/2/2017  1:01 PM CDT -----  Patient states she received a letter stating she is missing an immunization. Please call at [phone] Thank you!

## 2017-06-02 NOTE — TELEPHONE ENCOUNTER
Patient notified that her Tetanus and zoster is due and that she will need to have completed at a pharmacy due to her insurance not covering the vaccine administration through the office.

## 2017-06-02 NOTE — TELEPHONE ENCOUNTER
"I returned her phone call. She stated she has started to take nitric oxide, L arginine to "improve kidneys". She is no longer in [facility] research study for nitric oxide. I explained to her in detail NOT to self administer these medicines as if she does that she is not really being monitored through any study protocol. She expressed understanding. I clarified to her I do not medically recommend using such supplements on her own.   "

## 2017-06-07 DIAGNOSIS — N18.30 CKD (CHRONIC KIDNEY DISEASE), STAGE III: ICD-10-CM

## 2017-06-07 DIAGNOSIS — I10 BENIGN ESSENTIAL HYPERTENSION: ICD-10-CM

## 2017-06-07 DIAGNOSIS — F51.01 PRIMARY INSOMNIA: ICD-10-CM

## 2017-06-07 RX ORDER — IRBESARTAN 300 MG/1
TABLET ORAL
Qty: 30 TABLET | Refills: 0 | Status: SHIPPED | OUTPATIENT
Start: 2017-06-07 | End: 2017-07-22 | Stop reason: SDUPTHER

## 2017-06-08 RX ORDER — ZOLPIDEM TARTRATE 5 MG/1
TABLET ORAL
Qty: 30 TABLET | Refills: 0 | Status: SHIPPED | OUTPATIENT
Start: 2017-06-08 | End: 2017-06-21 | Stop reason: SDUPTHER

## 2017-06-12 ENCOUNTER — LAB VISIT (OUTPATIENT)
Dept: LAB | Facility: HOSPITAL | Age: 72
End: 2017-06-12
Attending: INTERNAL MEDICINE
Payer: MEDICARE

## 2017-06-12 DIAGNOSIS — B96.20 E. COLI UTI: ICD-10-CM

## 2017-06-12 DIAGNOSIS — N39.0 E. COLI UTI: ICD-10-CM

## 2017-06-12 PROCEDURE — 36415 COLL VENOUS BLD VENIPUNCTURE: CPT | Mod: PO

## 2017-06-12 PROCEDURE — 80069 RENAL FUNCTION PANEL: CPT

## 2017-06-13 ENCOUNTER — TELEPHONE (OUTPATIENT)
Dept: NEPHROLOGY | Facility: CLINIC | Age: 72
End: 2017-06-13

## 2017-06-13 LAB
ALBUMIN SERPL BCP-MCNC: 3.4 G/DL
ANION GAP SERPL CALC-SCNC: 6 MMOL/L
BUN SERPL-MCNC: 29 MG/DL
CALCIUM SERPL-MCNC: 8.7 MG/DL
CHLORIDE SERPL-SCNC: 114 MMOL/L
CO2 SERPL-SCNC: 20 MMOL/L
CREAT SERPL-MCNC: 1.7 MG/DL
EST. GFR  (AFRICAN AMERICAN): 34.5 ML/MIN/1.73 M^2
EST. GFR  (NON AFRICAN AMERICAN): 29.9 ML/MIN/1.73 M^2
GLUCOSE SERPL-MCNC: 86 MG/DL
PHOSPHATE SERPL-MCNC: 3.3 MG/DL
POTASSIUM SERPL-SCNC: 4.9 MMOL/L
SODIUM SERPL-SCNC: 140 MMOL/L

## 2017-06-14 ENCOUNTER — TELEPHONE (OUTPATIENT)
Dept: FAMILY MEDICINE | Facility: CLINIC | Age: 72
End: 2017-06-14

## 2017-06-14 ENCOUNTER — NUTRITION (OUTPATIENT)
Dept: NUTRITION | Facility: HOSPITAL | Age: 72
End: 2017-06-14
Attending: FAMILY MEDICINE
Payer: MEDICARE

## 2017-06-14 VITALS — BODY MASS INDEX: 37.24 KG/M2 | WEIGHT: 252.19 LBS

## 2017-06-14 DIAGNOSIS — Z71.3 PRE-BARIATRIC SURGERY NUTRITION EVALUATION: Primary | ICD-10-CM

## 2017-06-14 PROCEDURE — 99212 OFFICE O/P EST SF 10 MIN: CPT | Mod: PBBFAC | Performed by: DIETITIAN, REGISTERED

## 2017-06-14 PROCEDURE — 99999 PR PBB SHADOW E&M-EST. PATIENT-LVL II: CPT | Mod: PBBFAC,,, | Performed by: DIETITIAN, REGISTERED

## 2017-06-14 NOTE — TELEPHONE ENCOUNTER
Patient notified we can not provide results from another provider, patient given number to  office to request lab results

## 2017-06-14 NOTE — TELEPHONE ENCOUNTER
----- Message from Bruno Benjamin sent at 6/14/2017 10:09 AM CDT -----  Contact: self  Pt requesting labs results be mailed to her.  Please call pt at .    Thanks

## 2017-06-14 NOTE — PROGRESS NOTES
NUTRITION NOTE    Referring Physician: Dr. Goff   Reason for MNT Referral: Medically Supervised Diet pending Gastric Sleeve    PAST MEDICAL HISTORY:    Patient presents for 2nd visit for MSD with weight loss over the past month; total weight loss by making numerous dietary and lifestyle changes.    Past Medical History:   Diagnosis Date    Hypertension     Kidney disease, chronic, stage III (GFR 30-59 ml/min)     Obesity     Osteoporosis        CLINICAL DATA:  71 y.o. female.    There were no vitals filed for this visit.    Current Weight: 252 lbs  Weight Change Since Initial Visit: -1 lbs  Ideal Body Weight: 169 lbs  Body mass index is 37.24 kg/m².      CURRENT DIET:  Regular diet.  Diet Recall: Food records are not present.  24 hr recall:   Breakfast: x1 slice toast and coffee   Lunch: x2 slices of pizza   Snack: Lollie pop  Dinner: Frozen dinner (meat loaf with mash potatoes)  w/ side salad (lettuce, soy peas, dressing)     Diet Includes: x3 meals/day  Meal Pattern: Same.  Protein Supplements: x1  per day. (Slim fast low carb)   Snacking: Adequate. Snacks include healthy choices and sweets (cherries, grapes, tootsie pops)    Vegetables: Likes a variety. Eats daily.  Fruits: Likes a variety. Eats daily.  Beverages: water, sugar-free beverages, coffee without sugar and 2% milk  Dining Out: Dining out: rarely Mostly fast food, restaurants and take-out.  Cooking at home: Daily. Mostly baked, grilled and smothered meat, fish, starchy CHO and vegetables.    CURRENT EXERCISE:  Fair (walks ~20 mins, 3x/week)    Vitamins / Minerals / Herbs:   Vitamin C, A, iron supplement, MBI, calicum, magnesium, CoQ10, omega 3, selenium    Food Allergies:   None    Social:  Retired.  Alcohol: None.  Smoking: None.    ASSESSMENT:  Patient demonstrates some willingness to change lifestyle habits as evidenced by weight loss, no food logs, including protein drinks and more food preparation at home.    Doing poorly with working on  greatest challenges: will start cutting back on starchy carbs (reivewed starchy carbs with patient), will continue with slim fast low carb supplement x1-2/day, will increase meal frequency, and work on appropriate portion sizes.     Adequate calorie intake.  Adequate protein intake.    PLAN:    Diet: Adjust diet plan.    Exercise: Maintain.    Behavior Modification: Begin to document food & activity logs daily.    Weight loss prior to surgery (5-10% TBW): 12-24  lbs    Return to clinic in one month.    SESSION TIME:  30 minutes

## 2017-06-21 ENCOUNTER — OFFICE VISIT (OUTPATIENT)
Dept: PODIATRY | Facility: CLINIC | Age: 72
End: 2017-06-21
Payer: MEDICARE

## 2017-06-21 VITALS
WEIGHT: 252 LBS | HEIGHT: 69 IN | DIASTOLIC BLOOD PRESSURE: 52 MMHG | SYSTOLIC BLOOD PRESSURE: 90 MMHG | BODY MASS INDEX: 37.33 KG/M2

## 2017-06-21 DIAGNOSIS — M20.31 ACQUIRED HALLUX MALLEUS OF RIGHT FOOT: ICD-10-CM

## 2017-06-21 DIAGNOSIS — M72.2 PLANTAR FASCIITIS: ICD-10-CM

## 2017-06-21 DIAGNOSIS — M79.672 FOOT PAIN, LEFT: Primary | ICD-10-CM

## 2017-06-21 PROCEDURE — 20550 NJX 1 TENDON SHEATH/LIGAMENT: CPT | Mod: S$PBB,,, | Performed by: PODIATRIST

## 2017-06-21 PROCEDURE — 99213 OFFICE O/P EST LOW 20 MIN: CPT | Mod: S$PBB,25,, | Performed by: PODIATRIST

## 2017-06-21 PROCEDURE — 20550 NJX 1 TENDON SHEATH/LIGAMENT: CPT | Mod: PBBFAC,PO | Performed by: PODIATRIST

## 2017-06-21 PROCEDURE — 99999 PR PBB SHADOW E&M-EST. PATIENT-LVL III: CPT | Mod: PBBFAC,,, | Performed by: PODIATRIST

## 2017-06-21 PROCEDURE — 99213 OFFICE O/P EST LOW 20 MIN: CPT | Mod: PBBFAC,PO | Performed by: PODIATRIST

## 2017-06-21 PROCEDURE — 1125F AMNT PAIN NOTED PAIN PRSNT: CPT | Mod: ,,, | Performed by: PODIATRIST

## 2017-06-21 PROCEDURE — 1159F MED LIST DOCD IN RCRD: CPT | Mod: ,,, | Performed by: PODIATRIST

## 2017-06-21 RX ORDER — LIDOCAINE HYDROCHLORIDE 20 MG/ML
1 INJECTION, SOLUTION EPIDURAL; INFILTRATION; INTRACAUDAL; PERINEURAL ONCE
Status: COMPLETED | OUTPATIENT
Start: 2017-06-21 | End: 2017-06-21

## 2017-06-21 RX ORDER — BUPIVACAINE HYDROCHLORIDE 5 MG/ML
1 INJECTION, SOLUTION EPIDURAL; INTRACAUDAL ONCE
Status: COMPLETED | OUTPATIENT
Start: 2017-06-21 | End: 2017-06-21

## 2017-06-21 RX ORDER — DEXAMETHASONE SODIUM PHOSPHATE 4 MG/ML
2 INJECTION, SOLUTION INTRA-ARTICULAR; INTRALESIONAL; INTRAMUSCULAR; INTRAVENOUS; SOFT TISSUE ONCE
Status: COMPLETED | OUTPATIENT
Start: 2017-06-21 | End: 2017-06-21

## 2017-06-21 RX ORDER — TRIAMCINOLONE ACETONIDE 40 MG/ML
20 INJECTION, SUSPENSION INTRA-ARTICULAR; INTRAMUSCULAR ONCE
Status: COMPLETED | OUTPATIENT
Start: 2017-06-21 | End: 2017-06-21

## 2017-06-21 RX ADMIN — BUPIVACAINE HYDROCHLORIDE 5 MG: 5 INJECTION, SOLUTION EPIDURAL; INTRACAUDAL; PERINEURAL at 03:06

## 2017-06-21 RX ADMIN — DEXAMETHASONE SODIUM PHOSPHATE 2 MG: 4 INJECTION, SOLUTION INTRAMUSCULAR; INTRAVENOUS at 03:06

## 2017-06-21 RX ADMIN — TRIAMCINOLONE ACETONIDE 20 MG: 40 INJECTION, SUSPENSION INTRA-ARTICULAR; INTRAMUSCULAR at 03:06

## 2017-06-21 RX ADMIN — LIDOCAINE HYDROCHLORIDE 20 MG: 20 INJECTION, SOLUTION EPIDURAL; INFILTRATION; INTRACAUDAL; PERINEURAL at 03:06

## 2017-06-21 NOTE — PROGRESS NOTES
Subjective:      Patient ID: Luisana Chun is a 71 y.o. female.    Chief Complaint: Heel Pain (left foot pcp Dr. Dave 3 wks ago )      Luisana Chun is a 71 y.o. female who returns to the podiatry clinic  with complaint of continued left foot heel pain. She has not been icing, stretching or wearing supportive shoes.  She relates her friend had injections and she would like one as well.    Patient admits to barefoot walking often in and around home    Current shoe gear:  [employer]    Patient Active Problem List   Diagnosis    Osteoporosis without pathological fracture    Menopausal state    CKD (chronic kidney disease), stage III    Secondary hyperparathyroidism    Hypertension, uncontrolled    Obesity, Class II, BMI 35-39.9    Vitamin D deficiency    Normocytic anemia     Current Outpatient Prescriptions on File Prior to Visit   Medication Sig Dispense Refill    calcitRIOL (ROCALTROL) 0.5 MCG Cap Take 1 capsule (0.5 mcg total) by mouth once daily. 90 capsule 0    CHONDROITIN SULFATE A (CHONDROITIN SULFATE ORAL) Take by mouth once daily at 6am.       clonazePAM (KLONOPIN) 1 MG tablet Take 1 tablet (1 mg total) by mouth nightly as needed. 30 tablet 2    ergocalciferol (ERGOCALCIFEROL) 50,000 unit Cap Take 1 capsule (50,000 Units total) by mouth once daily. 5 capsule 0    estradiol (ESTRACE) 0.5 MG tablet Take 1 tablet (0.5 mg total) by mouth once daily. 30 tablet 11    fluoxetine (PROZAC) 40 MG capsule Take 1 capsule (40 mg total) by mouth every morning. (Patient taking differently: Take 40 mg by mouth once daily. ) 90 capsule 3    irbesartan (AVAPRO) 300 MG tablet TAKE ONE TABLET BY MOUTH IN THE EVENING 30 tablet 0    medroxyPROGESTERone (PROVERA) 2.5 MG tablet Take 1 tablet (2.5 mg total) by mouth once daily. 30 tablet 11    sodium bicarbonate 325 MG tablet Take 325 mg by mouth 4 (four) times daily.      trazodone (DESYREL) 50 MG tablet Take 1 tablet (50 mg total) by mouth every evening.  90 tablet 2    zolpidem (AMBIEN) 5 MG Tab Take 5 mg by mouth nightly as needed.      [DISCONTINUED] azithromycin (ZITHROMAX Z-RAZA) 250 MG tablet Take 2 pills day 1, then 1 pill day 2-5. 6 tablet 0    [DISCONTINUED] cetirizine (ZYRTEC) 10 MG tablet Take 1 tablet (10 mg total) by mouth once daily. 30 tablet 0    [DISCONTINUED] ciprofloxacin HCl (CIPRO) 500 MG tablet Take 1 tablet (500 mg total) by mouth 2 (two) times daily. 14 tablet 0    [DISCONTINUED] fluticasone (FLONASE) 50 mcg/actuation nasal spray 1 spray by Each Nare route once daily. 16 g 0    [DISCONTINUED] triamcinolone acetonide 0.1% (KENALOG) 0.1 % cream 1 application 2 (two) times daily. Apply to affected area  1    [DISCONTINUED] zolpidem (AMBIEN) 5 MG Tab TAKE ONE TABLET BY MOUTH NIGHTLY 30 tablet 0     No current facility-administered medications on file prior to visit.      Review of patient's allergies indicates:  No Known Allergies  Past Surgical History:   Procedure Laterality Date    APPENDECTOMY      BACK SURGERY  february 2016    COLONOSCOPY      ESOPHAGOGASTRODUODENOSCOPY      KNEE SURGERY Bilateral     left toe Left     TONSILLECTOMY       Family History   Problem Relation Age of Onset    Hypertension Mother     Heart disease Mother     Macular degeneration Mother     Hypertension Father     Cataracts Father     Glaucoma Father     Macular degeneration Father     Glaucoma Brother     Macular degeneration Brother     No Known Problems Sister     No Known Problems Maternal Aunt     No Known Problems Maternal Uncle     No Known Problems Paternal Aunt     No Known Problems Paternal Uncle     Macular degeneration Maternal Grandmother     Macular degeneration Maternal Grandfather     Macular degeneration Paternal Grandmother     Glaucoma Paternal Grandfather     Macular degeneration Paternal Grandfather     Amblyopia Neg Hx     Blindness Neg Hx     Cancer Neg Hx     Diabetes Neg Hx     Retinal detachment Neg Hx   "   Strabismus Neg Hx     Stroke Neg Hx     Thyroid disease Neg Hx      Social History     Social History    Marital status:      Spouse name: N/A    Number of children: N/A    Years of education: N/A     Occupational History    Not on file.     Social History Main Topics    Smoking status: Former Smoker    Smokeless tobacco: Never Used    Alcohol use 0.0 oz/week      Comment: social    Drug use: No    Sexual activity: Not Currently     Partners: Male      Comment: 5/4/17 single     Other Topics Concern    Not on file     Social History Narrative    No narrative on file       Review of Systems   Constitution: Negative for chills, fever and weakness.   Cardiovascular: Positive for leg swelling. Negative for claudication.   Respiratory: Negative for cough and shortness of breath.    Skin: Positive for dry skin and suspicious lesions (left forefoot). Negative for itching, nail changes and rash.   Musculoskeletal: Positive for arthritis, back pain, joint pain and myalgias. Negative for falls, joint swelling and muscle weakness.   Gastrointestinal: Negative for diarrhea, nausea and vomiting.   Neurological: Positive for numbness and paresthesias. Negative for tremors.   Psychiatric/Behavioral: Negative for altered mental status and hallucinations.           Objective:       Vitals:    06/21/17 1442   BP: (!) 90/52   Weight: 114.3 kg (252 lb)   Height: 5' 9" (1.753 m)   PainSc: 10-Worst pain ever   PainLoc: Foot       Physical Exam   Constitutional: She appears well-developed and well-nourished. No distress.   Alert and oriented x 3. No evidence of depression, anxiety, or agitation. Calm, cooperative, and communicative. Appropriate interactions and affect.       Cardiovascular:   Pulses:       Dorsalis pedis pulses are 2+ on the right side, and 2+ on the left side.        Posterior tibial pulses are 1+ on the right side, and 1+ on the left side.   Dorsalis pedis and posterior tibial pulses are " diminished Bilaterally.     There is decreased digital hair. Skin is atrophic, slightly hyperpigmented    1+ pitting edema           Musculoskeletal:        Right ankle: She exhibits no swelling and no ecchymosis. No tenderness. Achilles tendon exhibits no pain.        Left ankle: She exhibits no swelling and no ecchymosis. No tenderness. Achilles tendon exhibits no pain.        Right foot: There is normal range of motion, no swelling and normal capillary refill.        Left foot: There is tenderness (plantar medial heel) and swelling. There is normal capillary refill.    Muscle strength is 5/5 in all groups bilaterally.    Decreased stride, station of gait.  apropulsive toe off.  Increased angle and base of gait.    Patient has hammertoes of digits 2-5 bilateral partially reducible without symptom today. Hallux malleus of the right foot    Fat pad atrophy to heels and met heads bilateral    Biomechanical exam: Pain on palpation left medial calcaneal tubercle at origin of plantar fascia. There is equinus deformity bilateral with decreased dorsiflexion at the ankle joint bilateral. No tenderness with compression of heel. Negative tinels sign. Gait analysis reveals excessive pronation through midstance and propulsion with early heel off. Shoes reveals lateral heel counter wear bilateral    Lymphadenopathy:   No lymphatic streaking    Negative lymphadenopathy bilateral popliteal fossa and tarsal tunnel.     Neurological:   Elliott-Leroy 5.07 monofilament is intact bilateral feet. Sharp/dull sensation is also intact Bilateral feet.    Paresthesias, and hyperesthesia bilateral feet at toes with no clearly identified trigger or source.       Skin: Skin is warm, dry and intact. No abrasion, no ecchymosis and no rash noted. She is not diaphoretic. No cyanosis or erythema. No pallor. Nails show no clubbing.   Psychiatric: She has a normal mood and affect. Her speech is normal and behavior is normal.   Nursing note and  vitals reviewed.          Assessment:       Encounter Diagnoses   Name Primary?    Foot pain, left Yes    Plantar fasciitis - Left Foot     Acquired hallux malleus of right foot          Plan:       Luisana was seen today for heel pain.    Diagnoses and all orders for this visit:    Foot pain, left    Plantar fasciitis - Left Foot  -     Ambulatory Referral to Physical/Occupational Therapy    Acquired hallux malleus of right foot  -     Ambulatory Referral to Physical/Occupational Therapy    Other orders  -     bupivacaine (PF) 0.5% (5 mg/ml) injection 5 mg; Inject 1 mL (5 mg total) into the skin once.  -     dexamethasone injection 2 mg; 0.5 mLs (2 mg total) by Other route once.  -     lidocaine (PF) 20 mg/ml (2%) injection 20 mg; 1 mL (20 mg total) by Other route once.  -     triamcinolone acetonide injection 20 mg; 0.5 mLs (20 mg total) by Other route once.      I counseled the patient on her conditions, their implications and medical management.    Shoe inspection. Foot Education. Patient instructed on proper foot hygeine. We discussed wearing proper shoe gear, daily foot inspections, never walking without protective shoe gear, never putting sharp instruments to feet    Discussed different treatment options for heel pain. I gave written and verbal instructions on stretching exercises. Patient expressed understanding. Discussed icing the affected area as needed and also wearing appropriate shoe gear and avoiding flats, slippers, sandals, and going barefoot. My recommendation for OTC supports is [employer]. Patient instructed on adequate icing techniques. Patient should ice the affected area at least once per day x 10 minutes for 10 days . I advised the patient that extra icing would also be beneficial to ensure adequate anti inflammatory effect. We also discussed cortisone injections and NSAID therapy.    Patient would like injection today.  With patient's permission,  a cortisone injection was  performed at the left foot, medial approach, using 3ccs of mixture of (1cc 1% plain Lidocaine : 1cc 0.5% Marcaine plain:  0.5cc kenalog-40 : 0.5cc dexamethasone).   This was well tolerated.         Pt to RTC  PRN

## 2017-06-21 NOTE — PATIENT INSTRUCTIONS
Recommend lotions: eucerin, aquaphor, A&D ointment, gold bond for diabetics, sween    Shoe recommendations: (try 6pm.com, zappos[employer] , nordstromrack[employer], or shoes[employer] for discounted prices) you can visit [employer] shoes in [place] as well    Asics (GT 1000 or gel foundations), new balance, saucony (stabil c3),  Christie (GTS or Beast or transcend), vionic, propet (tennis shoe)    soft brand, clarks, crocs, aerosoles, naturalizers, SAS, ecco, jovani, gopal joseph, rockports (dress shoes)    Vionic, volitiles, burkenstocks, fitflops, propet, the healing sole sandals  (sandals)    Nike comfort thong sandals, crocs (house shoes)    Nail Home remedy:  Vicks Vapor rub OR Listerine and apple cider vinegar in a spray bottle to nails    Occasional soaks for 15-20 mins in luke warm water with 1 cup of listerine and 1 cup of apple cider vinegar are ok You may add several drops of oil of oregano or tea tree oil as well      Understanding Plantar Fasciitis    Plantar fasciitis is a condition that causes foot and heel pain. The plantar fascia is a tough band of tissue that runs across the bottom of the foot from the heel to the toes. This tissue pulls on the heel bone. It supports the arch of the foot as it pushes off the ground. If the tissue becomes irritated or red and swollen (inflamed), it is called plantar fasciitis.  How to say it  PLAN-tuhr fa-see-IY-tis   What causes plantar fasciitis?  Plantar fasciitis most often occurs from overusing the plantar fascia. The tissue may become damaged from activities that put repeated stress on the heel and foot. Or it may wear down over time with age and ankle stiffness. You are more likely to have plantar fasciitis if you:  · Do activities that require a lot of running, jumping, or dancing  · Have a job that requires being on your feet for long periods  · Are overweight or obese  · Have certain foot problems, such as a tight Achilles tendon, flat feet, or high arches  · Often wear poorly fitting  shoes  Symptoms of plantar fasciitis  The condition most often causes pain in the heel and the bottom of the foot. The pain may occur when you take your first steps in the morning. It may get better as you walk throughout the day. But as you continue to put weight on the foot, the pain often returns. Pain may also occur after standing or sitting for long periods.  Treating plantar fasciitis  Treatments for plantar fasciitis include:  · Resting the foot. This involves limiting movements that make your foot hurt. You may also need to avoid certain sports and types of work for a time.  · Using cold packs. Put an ice pack on the heel and foot to help reduce pain and swelling.  · Taking pain medicines. Prescription and over-the-counter pain medicines can help relieve pain and swelling.  · Using heel cups or foot inserts (orthotics). These are placed in the shoes to help support the heel or arch and cushion the heel. You may also be told to buy proper-fitting shoes with good arch support and cushioned soles.  · Taping the foot. This supports the arch and limits the movement of the plantar fascia to help relieve symptoms.  · Wearing a night splint. This stretches the plantar fascia and leg muscles while you sleep. This may help relieve pain.  · Doing exercises and physical therapy. These stretch and strengthen the plantar fascia and the muscles in the leg that support the heel and foot.  · Getting shots of medicine into the foot. These may help relieve symptoms for a time.  · Having surgery. This may be needed if other treatments fail to relieve symptoms. During surgery, the surgeon may partially cut the plantar fascia to release tension.  Possible complications of plantar fasciitis  Without proper care and treatment, healing may take longer than normal. Also, symptoms may continue or get worse. Over time, the plantar fascia may be damaged. This can make it hard to walk or even stand without pain.  When to call your  healthcare provider  Call your healthcare provider right away if you have any of these:  · Fever of 100.4°F (38°C) or higher, or as directed  · Symptoms that dont get better with treatment, or get worse  · New symptoms, such as numbness, tingling, or weakness in the foot   © 3177-9599 [employer]. [address] [place] [address]. All rights reserved. This information is not intended as a substitute for professional medical care. Always follow your healthcare professional's instructions.        Treating Plantar Fasciitis  First, your healthcare provider tries to determine the cause of your problem in order to suggest ways to relieve pain. If your pain is due to poor foot mechanics, custom-made shoe inserts (orthoses) may help.    Reduce symptoms  · To relieve mild symptoms, try aspirin, ibuprofen, or other medicines as directed. Rubbing ice on the affected area may also help.  · To reduce severe pain and swelling, your healthcare provider may prescribe pills or injections or a walking cast in some instances. Physical therapy, such as ultrasound or a daily stretching program, may also be recommended. Surgery is rarely required.  · To reduce symptoms caused by poor foot mechanics, your foot may be taped. This supports the arch and temporarily controls movement. Night splints may also help by stretching the fascia.  Control movement  If taping helps, your healthcare provider may prescribe orthoses. Built from plaster casts of your feet, these inserts control the way your foot moves. As a result, your symptoms should go away.  Reduce overuse  Every time your foot strikes the ground, the plantar fascia is stretched. You can reduce the strain on the plantar fascia and the possibility of overuse by following these suggestions:  · Lose any excess weight.  · Avoid running on hard or uneven ground.  · Use orthoses at all times in your shoes and house slippers.  If surgery is needed  Your healthcare  provider may consider surgery if other types of treatment don't control your pain. During surgery, the plantar fascia is partially cut to release tension. As you heal, fibrous tissue fills the space between the heel bone and the plantar fascia.   © 7815-3619 [employer]. [address] [place] [address]. All rights reserved. This information is not intended as a substitute for professional medical care. Always follow your healthcare professional's instructions.        Wearing Proper Shoes                    You walk on your feet every day, forcing them to support the weight of your body. Repeated stress on your feet can cause damage over time. The right shoes can help protect your feet. The wrong shoes can cause more foot problems. Read the information below to help you find a shoe that fits your foot needs.     A good shoe fit will cover your foot outline.       A shoe that doesnt cover the outline is a bad fit.      Whats Your Foot Shape?  To get a good fit, you need to know the shape of your foot. Do this simple test: While standing, place your foot on a piece of paper and trace around it. Is your foot straight or curved? Do you have a foot problem, such as a bunion, that causes your foot outline to show a bulge on the side of your big toe?  Finding Your Fit  Bring your foot outline to the [employer] store to help you find the right shoe. Place a shoe you like on top of the outline to see if it matches the shape. The shoe should cover the outline. (If you have a bunion, the shoe may not cover the bulge on the outline. Look for soft leather shoes to stretch over the bunion.) Once youve found a pair of proper shoes, put them on. Walk around. Be sure the shoes dont rub or pinch. If the shoes feel good, youve found your fit!  The Right Shoe for You  A good shoe has features that provide comfort and support. It must also be the right size and shape for your feet. Look for a shoe made of  breathable fabric and lining, such as leather or canvas. Be sure that shoes have enough tread to prevent slipping. Go to a good shoe store for help finding the right shoe.  Good Shoe Features  An ideal shoe has the following:  · Laces for support. If tying laces is a problem for you, try shoes with Velcro fasteners or nata.  · A front of the shoe (toe box) with ½ inch space in front of your longest toes.  · An arch shape that supports your foot.  · No more than 1½ inches of heel.  · A stiff, snug back of the shoe to keep your foot from sliding around.  · A smooth lining with no rough seams.  Shoe Shopping Tips  Below are some dos and donts for when you go to the shoe store.  Do:  · Select the shoes that feel right. Wear them around the house. Then bring them to your foot doctor to check for fit. If they dont fit well, return them.  · Shop late in the day, when your feet will be slightly bigger.  · Each time you buy shoes, have both your feet measured while you are standing. Foot size changes with time.  · Pick shoes to suit their purpose. High heels are okay for an occasional night on the town. But for everyday wear, choose a more sensible shoe.  · Try on shoes while wearing any inserts specially made for your feet (orthoses).  · Try on both the right and left shoes. If your feet are different sizes, pick a pair that fits the larger foot.  Dont:  · Dont buy shoes based on shoe size alone. Always try on shoes, as sizes differ from brand to brand and within brands.  · Dont expect shoes to break in. If they dont fit at the store, dont buy them.  · Dont buy a shoe that doesnt match your foot shape.  What About Socks?  Always wear socks with shoes. Socks help absorb sweat and reduce friction and blistering. When shopping for shoes, choose soft, padded socks with seams that dont irritate your feet.  If You Have Foot Problems  Some foot problems cause deformities. This can make it hard to find a good fit.  Look for shoes made of soft leather to stretch over the deformity. If you have bunions, buy shoes with a wider toe box. To fit hammertoes, look for shoes with a tall toe box. If you have arch problems, you may need inserts. In some cases, youll need to have custom footwear or orthoses made for your feet.  Suggested Footwear  Ask your healthcare provider what kind of footwear you need. He or she may recommend a certain brand or shoe store.  © 4158-1524 [employer]. [address] [place] [address]. All rights reserved. This information is not intended as a substitute for professional medical care. Always follow your healthcare professional's instructions.        Toe Extension (Flexibility)    These instructions are for your right foot. Switch sides for your left foot.  1. Sit in a chair. Rest your right ankle on your left knee.  2. Hold your toes with your right hand. Gently bend the toes backward. Feel a stretch in the undersides of the toes and ball of the foot. Hold for 30 to 60 seconds.  3. Then gently bend the toes in the other direction. Gently press on them until your foot is pointed. Hold for 30 to 60 seconds.  4. Repeat 5 times, or as instructed.  © 6989-0774 [employer]. [address] [place] [address], [place] [address]. All rights reserved. This information is not intended as a substitute for professional medical care. Always follow your healthcare professional's instructions.

## 2017-06-23 ENCOUNTER — OFFICE VISIT (OUTPATIENT)
Dept: SLEEP MEDICINE | Facility: CLINIC | Age: 72
End: 2017-06-23
Payer: MEDICARE

## 2017-06-23 VITALS
DIASTOLIC BLOOD PRESSURE: 75 MMHG | BODY MASS INDEX: 37.21 KG/M2 | WEIGHT: 252 LBS | HEART RATE: 73 BPM | OXYGEN SATURATION: 99 % | SYSTOLIC BLOOD PRESSURE: 127 MMHG

## 2017-06-23 DIAGNOSIS — G47.33 OSA (OBSTRUCTIVE SLEEP APNEA): Primary | ICD-10-CM

## 2017-06-23 DIAGNOSIS — E66.01 MORBID OBESITY DUE TO EXCESS CALORIES: ICD-10-CM

## 2017-06-23 PROCEDURE — 1159F MED LIST DOCD IN RCRD: CPT | Mod: ,,, | Performed by: INTERNAL MEDICINE

## 2017-06-23 PROCEDURE — 99213 OFFICE O/P EST LOW 20 MIN: CPT | Mod: PBBFAC,PO | Performed by: INTERNAL MEDICINE

## 2017-06-23 PROCEDURE — 99999 PR PBB SHADOW E&M-EST. PATIENT-LVL III: CPT | Mod: PBBFAC,,, | Performed by: INTERNAL MEDICINE

## 2017-06-23 PROCEDURE — 99214 OFFICE O/P EST MOD 30 MIN: CPT | Mod: S$PBB,,, | Performed by: INTERNAL MEDICINE

## 2017-06-23 NOTE — PROGRESS NOTES
Luisana Chun  was seen as a follow up.     CHIEF COMPLAINT:    Chief Complaint   Patient presents with    Sleep Apnea     results sleep study        HISTORY OF PRESENT ILLNESS: Luisana Chun is a 71 y.o. female is here for sleep evaluation.   Patient is interested in bariatric surgery.  Patient sleep alone and is not sure if she snores.  No witnessed apnea.  Feeling rested upon awake.  No parasomnia.  No cataplexy.  Patient was told to have raheel based sleep study in 2013 in [place].  Did not tolerate.  No weigh changes.      +frequent cramping upon awake.        [place] Sleepiness Scale score during initial sleep evaluation was 3.    S/p sleep study since last visit.  No new issue.  Study was terminated prematurely due to ant bites.      SLEEP ROUTINE:  Activity the hour prior to sleep: read    Bed partner:  alone  Time to bed:  11 pm   Lights off:  yes  Sleep onset latency:  20 minutes        Disruptions or awakenings:    3 times (no difficulty going back to sleep)    Wakeup time:      8:30 am   Perceived sleep quality:  rested       Daytime naps:      60 minutes (rested)  Weekend sleep routine:      11 pm to 9 am   Caffeine use: 2 mugs of coffee in am   exercise habit:   Gardening and minimal walking      PAST MEDICAL HISTORY:    Active Ambulatory Problems     Diagnosis Date Noted    Osteoporosis without pathological fracture 02/16/2016    Menopausal state 02/16/2016    CKD (chronic kidney disease), stage III 05/20/2016    Secondary hyperparathyroidism 05/20/2016    Hypertension, uncontrolled 06/07/2016    Obesity, Class II, BMI 35-39.9 12/07/2016    Vitamin D deficiency 03/23/2017    Normocytic anemia 05/04/2017     Resolved Ambulatory Problems     Diagnosis Date Noted    Annual physical exam 02/16/2016    Sebaceous cyst 02/16/2016    Spinal axis tumor 06/10/2016    Bone lesion 06/30/2016    Cancer screening 07/05/2016    History of anemia due to chronic kidney disease 05/04/2017     Past  Medical History:   Diagnosis Date    Hypertension     Kidney disease, chronic, stage III (GFR 30-59 ml/min)     Obesity     Osteoporosis                 PAST SURGICAL HISTORY:    Past Surgical History:   Procedure Laterality Date    APPENDECTOMY      BACK SURGERY  february 2016    COLONOSCOPY      ESOPHAGOGASTRODUODENOSCOPY      KNEE SURGERY Bilateral     left toe Left     TONSILLECTOMY           FAMILY HISTORY:                Family History   Problem Relation Age of Onset    Hypertension Mother     Heart disease Mother     Macular degeneration Mother     Hypertension Father     Cataracts Father     Glaucoma Father     Macular degeneration Father     Glaucoma Brother     Macular degeneration Brother     No Known Problems Sister     No Known Problems Maternal Aunt     No Known Problems Maternal Uncle     No Known Problems Paternal Aunt     No Known Problems Paternal Uncle     Macular degeneration Maternal Grandmother     Macular degeneration Maternal Grandfather     Macular degeneration Paternal Grandmother     Glaucoma Paternal Grandfather     Macular degeneration Paternal Grandfather     Amblyopia Neg Hx     Blindness Neg Hx     Cancer Neg Hx     Diabetes Neg Hx     Retinal detachment Neg Hx     Strabismus Neg Hx     Stroke Neg Hx     Thyroid disease Neg Hx        SOCIAL HISTORY:          Tobacco:   History   Smoking Status    Former Smoker   Smokeless Tobacco    Never Used       alcohol use:    History   Alcohol Use    0.0 oz/week     Comment: social                 Occupation:  Real estate management    ALLERGIES:  Review of patient's allergies indicates:  No Known Allergies    CURRENT MEDICATIONS:    Current Outpatient Prescriptions   Medication Sig Dispense Refill    calcitRIOL (ROCALTROL) 0.5 MCG Cap Take 1 capsule (0.5 mcg total) by mouth once daily. 90 capsule 0    CHONDROITIN SULFATE A (CHONDROITIN SULFATE ORAL) Take by mouth once daily at 6am.       clonazePAM  (KLONOPIN) 1 MG tablet Take 1 tablet (1 mg total) by mouth nightly as needed. 30 tablet 2    ergocalciferol (ERGOCALCIFEROL) 50,000 unit Cap Take 1 capsule (50,000 Units total) by mouth once daily. 5 capsule 0    estradiol (ESTRACE) 0.5 MG tablet Take 1 tablet (0.5 mg total) by mouth once daily. 30 tablet 11    fluoxetine (PROZAC) 40 MG capsule Take 1 capsule (40 mg total) by mouth every morning. (Patient taking differently: Take 40 mg by mouth once daily. ) 90 capsule 3    irbesartan (AVAPRO) 300 MG tablet TAKE ONE TABLET BY MOUTH IN THE EVENING 30 tablet 0    medroxyPROGESTERone (PROVERA) 2.5 MG tablet Take 1 tablet (2.5 mg total) by mouth once daily. 30 tablet 11    PROCTO-RAZA 1 % crpe       sodium bicarbonate 325 MG tablet Take 325 mg by mouth 4 (four) times daily.      trazodone (DESYREL) 50 MG tablet Take 1 tablet (50 mg total) by mouth every evening. 90 tablet 2    zolpidem (AMBIEN) 5 MG Tab Take 5 mg by mouth nightly as needed.       No current facility-administered medications for this visit.                   REVIEW OF SYSTEMS:   No acute changes from previous encounter dated 5/11/2017 with exceptions mentioned in HPI.             PHYSICAL EXAM:  Vitals:    06/23/17 1422 06/23/17 1423   BP: (!) 156/76 127/75   Pulse: 73 73   SpO2: 99%    Weight: 114.3 kg (252 lb)      Body mass index is 37.21 kg/m².     GENERAL: Normal development, well groomed  HEENT:  Conjunctivae are non-erythematous; Pupils equal, round, and reactive to light; Nose is symmetrical; Nasal mucosa is pink and moist; Septum is midline; Inferior turbinates are normal; Nasal airflow is normal; Posterior pharynx is pink; Modified Mallampati: 3; Posterior palate is normal; Tonsils +1; Uvula is normal and pink;Tongue is normal; edentulous; No TMJ tenderness; Jaw opening and protrusion without click and without discomfort.  NECK: Supple. Neck circumference is 17 inches. No thyromegaly. No palpable nodes.     SKIN: On face and neck: No  abrasions, no rashes, no lesions.  No subcutaneous nodules are palpable.  RESPIRATORY: Chest is clear to auscultation.  Normal chest expansion and non-labored breathing at rest.  CARDIOVASCULAR: Normal S1, S2.  No murmurs, gallops or rubs. No carotid bruits bilaterally.  EXTREMITIES: No edema. No clubbing. No cyanosis. Station normal. Gait normal.        NEURO/PSYCH: Oriented to time, place and person. Normal attention span and concentration. Affect is full. Mood is normal.                                              DATA   5/26/17 ahi of 18; supine ahi of 78  Lab Results   Component Value Date    TSH 1.663 12/15/2016     ASSESSMENT    ICD-10-CM ICD-9-CM    1. KING (obstructive sleep apnea) G47.33 327.23 CPAP FOR HOME USE   2. Morbid obesity due to excess calories E66.01 278.01        PLAN:    Sleep Apnea NEC. - result of sleep study d/w patient.  Recommend treatment due to severity.  Patient agreed to apap.  Desensitization protocol d/w patient.      Obesity - planned for bariatric surgery.    Insomnia - maintenance is the issue.  May related to hyperarousal from untreated king.  Will monitor and address after apap.       Education: During our discussion today, we talked about the etiology of obstructive sleep apnea as well as the potential ramifications of untreated sleep apnea, which could include daytime sleepiness, hypertension, heart disease and/or stroke.     Precautions: The patient was advised to abstain from driving should they feel sleepy or drowsy.       Thank you for allowing me the opportunity to participate in the care of your patient.    Patient will No Follow-up on file.    Please cc note to  No ref. provider found.    This is 25 minutes visit, over 50% of time spent in direct consultation with patient.

## 2017-06-26 ENCOUNTER — PATIENT MESSAGE (OUTPATIENT)
Dept: ENDOCRINOLOGY | Facility: HOSPITAL | Age: 72
End: 2017-06-26

## 2017-06-26 ENCOUNTER — TELEPHONE (OUTPATIENT)
Dept: PODIATRY | Facility: CLINIC | Age: 72
End: 2017-06-26

## 2017-06-26 ENCOUNTER — TELEPHONE (OUTPATIENT)
Dept: ENDOCRINOLOGY | Facility: CLINIC | Age: 72
End: 2017-06-26

## 2017-06-26 DIAGNOSIS — M81.0 OSTEOPOROSIS WITHOUT PATHOLOGICAL FRACTURE: Primary | ICD-10-CM

## 2017-06-26 NOTE — TELEPHONE ENCOUNTER
----- Message from Rosita Hudson sent at 6/26/2017  2:04 PM CDT -----  Contact: self  Patient need to speak with nurse.   Patient states had oral surgery and dental implant has been removed.   Please call pt at [phone]

## 2017-06-26 NOTE — TELEPHONE ENCOUNTER
----- Message from Karely Treviño sent at 6/26/2017  1:53 PM CDT -----  Contact: self  Patient is calling stating she needs to schedule an operation .      [phone]     LL

## 2017-06-27 NOTE — TELEPHONE ENCOUNTER
Spoke with patient regarding osteopenia. Will order bone density for February 2018. Clinic follow-up in March 2018. Recommending continuing HRT and will hold off any antiresorptive therapy at this time.

## 2017-06-27 NOTE — TELEPHONE ENCOUNTER
----- Message from Lizzie Tracy sent at 6/27/2017  8:07 AM CDT -----  Contact: Self [phone]  Pt does not have access to her e-mail . She is requesting a call at [phone]. No recent fractures.

## 2017-06-28 ENCOUNTER — OFFICE VISIT (OUTPATIENT)
Dept: FAMILY MEDICINE | Facility: CLINIC | Age: 72
End: 2017-06-28
Payer: MEDICARE

## 2017-06-28 ENCOUNTER — TELEPHONE (OUTPATIENT)
Dept: FAMILY MEDICINE | Facility: CLINIC | Age: 72
End: 2017-06-28

## 2017-06-28 VITALS
OXYGEN SATURATION: 97 % | TEMPERATURE: 99 F | BODY MASS INDEX: 37.13 KG/M2 | DIASTOLIC BLOOD PRESSURE: 86 MMHG | HEART RATE: 86 BPM | RESPIRATION RATE: 20 BRPM | HEIGHT: 69 IN | SYSTOLIC BLOOD PRESSURE: 160 MMHG | WEIGHT: 250.69 LBS

## 2017-06-28 DIAGNOSIS — L71.9 ROSACEA: ICD-10-CM

## 2017-06-28 DIAGNOSIS — D64.9 NORMOCYTIC ANEMIA: ICD-10-CM

## 2017-06-28 DIAGNOSIS — Z01.811 PRE-OP CHEST EXAM: Primary | ICD-10-CM

## 2017-06-28 DIAGNOSIS — N18.30 CKD (CHRONIC KIDNEY DISEASE), STAGE III: ICD-10-CM

## 2017-06-28 PROCEDURE — 1125F AMNT PAIN NOTED PAIN PRSNT: CPT | Mod: ,,, | Performed by: FAMILY MEDICINE

## 2017-06-28 PROCEDURE — 99214 OFFICE O/P EST MOD 30 MIN: CPT | Mod: S$PBB,,, | Performed by: FAMILY MEDICINE

## 2017-06-28 PROCEDURE — 93010 ELECTROCARDIOGRAM REPORT: CPT | Mod: ,,, | Performed by: INTERNAL MEDICINE

## 2017-06-28 PROCEDURE — 99999 PR PBB SHADOW E&M-EST. PATIENT-LVL III: CPT | Mod: PBBFAC,,, | Performed by: FAMILY MEDICINE

## 2017-06-28 PROCEDURE — 1159F MED LIST DOCD IN RCRD: CPT | Mod: ,,, | Performed by: FAMILY MEDICINE

## 2017-06-28 PROCEDURE — 99213 OFFICE O/P EST LOW 20 MIN: CPT | Mod: PBBFAC,PO | Performed by: FAMILY MEDICINE

## 2017-06-28 RX ORDER — METRONIDAZOLE 7.5 MG/G
GEL TOPICAL 2 TIMES DAILY
Qty: 45 G | Refills: 2 | Status: SHIPPED | OUTPATIENT
Start: 2017-06-28 | End: 2017-08-16

## 2017-06-28 NOTE — TELEPHONE ENCOUNTER
No answer - unable to LM due to mailbox full. Patient was seen today with elevated BP. Patient need to schedule nurse Bp check on Friday 6/30/17. If BP is still elevated during visit will send in new script during visit to control BP.

## 2017-06-28 NOTE — PROGRESS NOTES
Preop Note      SUBJECTIVE:     Luisana Chun is a 71 y.o. female who presents to the office today for a preoperative consultation at the request of surgeon Dr Walters who plans on performing bone spur removal on L foot on  TBD. This consultation is requested for the specific conditions prompting preoperative evaluation (i.e. because of potential affect on operative risk):     CARDIAC:  Uncontrolled Hypertension  CKD stage 3    PULMONARY:  None    NUTRITION:    Morbid obesity    ANESTHESIA:  No prior issues with anesthesia in the past.     SOCIAL:    Lives alone    Review of Systems:  Review of Systems   Constitutional: Negative for chills and fever.   HENT: Negative for congestion and ear pain.    Eyes: Negative for double vision and pain.   Respiratory: Negative for cough and shortness of breath.    Cardiovascular: Negative for chest pain and palpitations.   Gastrointestinal: Negative for nausea and vomiting.   Genitourinary: Negative for dysuria and hematuria.   Musculoskeletal: Negative for myalgias and neck pain.   Skin: Negative for itching and rash.   Neurological: Negative for seizures and loss of consciousness.   Psychiatric/Behavioral: Negative for depression and substance abuse. The patient is not nervous/anxious.           Review of patient's allergies indicates:  No Known Allergies  Current Outpatient Prescriptions on File Prior to Visit   Medication Sig Dispense Refill    calcitRIOL (ROCALTROL) 0.5 MCG Cap Take 1 capsule (0.5 mcg total) by mouth once daily. 90 capsule 0    CHONDROITIN SULFATE A (CHONDROITIN SULFATE ORAL) Take by mouth once daily at 6am.       clonazePAM (KLONOPIN) 1 MG tablet Take 1 tablet (1 mg total) by mouth nightly as needed. 30 tablet 2    ergocalciferol (ERGOCALCIFEROL) 50,000 unit Cap Take 1 capsule (50,000 Units total) by mouth once daily. 5 capsule 0    estradiol (ESTRACE) 0.5 MG tablet Take 1 tablet (0.5 mg total) by mouth once daily. 30 tablet 11    fluoxetine  (PROZAC) 40 MG capsule Take 1 capsule (40 mg total) by mouth every morning. (Patient taking differently: Take 40 mg by mouth once daily. ) 90 capsule 3    irbesartan (AVAPRO) 300 MG tablet TAKE ONE TABLET BY MOUTH IN THE EVENING 30 tablet 0    medroxyPROGESTERone (PROVERA) 2.5 MG tablet Take 1 tablet (2.5 mg total) by mouth once daily. 30 tablet 11    PROCTO-RAZA 1 % crpe 1 applicator as needed.       sodium bicarbonate 325 MG tablet Take 325 mg by mouth 4 (four) times daily.      trazodone (DESYREL) 50 MG tablet Take 1 tablet (50 mg total) by mouth every evening. 90 tablet 2    zolpidem (AMBIEN) 5 MG Tab Take 5 mg by mouth nightly as needed.       No current facility-administered medications on file prior to visit.      Past Medical History:   Diagnosis Date    Hypertension     Kidney disease, chronic, stage III (GFR 30-59 ml/min)     Obesity     Osteoporosis      Past Surgical History:   Procedure Laterality Date    APPENDECTOMY      BACK SURGERY  february 2016    COLONOSCOPY      ESOPHAGOGASTRODUODENOSCOPY      KNEE SURGERY Bilateral     left toe Left     TONSILLECTOMY       Family History   Problem Relation Age of Onset    Hypertension Mother     Heart disease Mother     Macular degeneration Mother     Hypertension Father     Cataracts Father     Glaucoma Father     Macular degeneration Father     Glaucoma Brother     Macular degeneration Brother     No Known Problems Sister     No Known Problems Maternal Aunt     No Known Problems Maternal Uncle     No Known Problems Paternal Aunt     No Known Problems Paternal Uncle     Macular degeneration Maternal Grandmother     Macular degeneration Maternal Grandfather     Macular degeneration Paternal Grandmother     Glaucoma Paternal Grandfather     Macular degeneration Paternal Grandfather     Amblyopia Neg Hx     Blindness Neg Hx     Cancer Neg Hx     Diabetes Neg Hx     Retinal detachment Neg Hx     Strabismus Neg Hx      Stroke Neg Hx     Thyroid disease Neg Hx       Social History     Social History    Marital status:      Spouse name: N/A    Number of children: N/A    Years of education: N/A     Occupational History    Not on file.     Social History Main Topics    Smoking status: Former Smoker    Smokeless tobacco: Never Used    Alcohol use 0.0 oz/week      Comment: social    Drug use: No    Sexual activity: Not Currently     Partners: Male      Comment: 5/4/17 single     Other Topics Concern    Not on file     Social History Narrative    No narrative on file         OBJECTIVE:     Vital Signs (Most Recent)  Temp: 98.6 °F (37 °C) (06/28/17 1521)  Pulse: 86 (06/28/17 1521)  Resp: 20 (06/28/17 1521)  BP: (!) 160/86 (06/28/17 1626)  SpO2: 97 % (06/28/17 1521)      Physical Exam:  Physical Exam   Constitutional: She is oriented to person, place, and time and well-developed, well-nourished, and in no distress. No distress.   HENT:   Head: Normocephalic.   Eyes: EOM are normal. Pupils are equal, round, and reactive to light. Right eye exhibits no discharge. Left eye exhibits no discharge. No scleral icterus.   Cardiovascular: Normal rate and intact distal pulses.  Exam reveals no gallop and no friction rub.    Murmur (2/6 MIRA) heard.  Pulmonary/Chest: Effort normal and breath sounds normal. No respiratory distress. She has no wheezes. She has no rales.   Abdominal: Soft. Bowel sounds are normal. She exhibits no distension. There is no tenderness. There is no rebound.   Musculoskeletal: She exhibits no edema or deformity.   Neurological: She is alert and oriented to person, place, and time. GCS score is 15.   Skin: Skin is warm and dry. She is not diaphoretic.   Psychiatric: Mood and affect normal.           Diagnostic Results:  Labs: Reviewed  cmp      ASSESSMENT/PLAN:     Pre-op chest exam  -     CBC auto differential; Future; Expected date: 06/28/2017  -     Comprehensive metabolic panel; Future; Expected date:  06/28/2017  -     EKG 12-lead    CKD (chronic kidney disease), stage III  -     EKG 12-lead    Normocytic anemia        White Score      HISTORY  Age >70 years (5 points)  Myocardial infarction within 6 months (10 points).    CARDIAC EXAM  Signs of CHF: ventricular gallop or JVD (11 points)  Significant aortic stenosis (3 points)    EKG  Arrhythmia other than sinus or premature atrial contractions (7 points)  5 or more PVCs per minute (7 points)    GENERAL MED CONDITIONS  PO2 <60 mmHg; PCO2 >50 mmHg; K <3 mEq/L; HCO3 <20 mEq/L; BUN >50 mg/dL; Creatinine >3 mg/dL; elevated SGOT; chronic liver disease; bedridden (3 points)    OPERATION  Emergency (4 points)  Intraperitoneal, intrathoracic or aortic (3 points)        0 to 5 Points: Class I 1% Complications   6 to 12 Points: Class II 7% Complications   13 to 25 Points: Class III 14% Complications   26 to 53 Points: Class IV 78% Complications     Hwite Score: 5      Surgery-Related Predictors for Risk of Perioperative Cardiac Complications  High risk   Emergency surgery   Anticipated increased blood loss   Aortic or peripheral vascular surgery     Intermediate risk   Abdominal or thoracic surgery   Head and neck surgery   Carotid endarterectomy   Orthopedic surgery   Prostate surgery     Low risk   Breast surgery   Cataract surgery   Superficial surgery   Endoscopy         Summary of Recommended Preoperative Laboratory Tests Depending on the History and Physical Findings  Condition Indicated testing and other measures*   Healthy patient    ? 40 years Hemoglobin, urine screening for pregnancy in women of childbearing potential    > 40 years Add ECG and blood glucose (age ? 45 years)   Cardiovascular disease ECG, chest radiographs, hemoglobin, electrolytes, BUN, creatinine, glucose (age ? 45 years or history of diabetes)    Recent MI (?6 weeks), unstable angina, decompensated CHF, significant arrhythmias, severe valvular disease Cardiology consultation    Previous MI  (> 6 weeks ago), mild stable angina, compensated CHF, diabetes mellitus Stress test if high-risk procedure or patient has low functional capacity; consider assessment of left ventricular function (i.e., echocardiography)    Rhythm other than normal sinus rhythm, abnormal ECG, history of stroke, advanced age, low functional capacity Stress test if high-risk procedure and patient has low functional capacity   Pulmonary disease Chest radiographs, hemoglobin, glucose (age ? 45 years), ECG (age > 40 years); provide patient with instructions for incentive spirometry or deep-breathing exercises    Asthma Pulmonary function testing or peak flow rate to assess disease status    COPD Consider pulmonary function testing and arterial blood gas analysis for assessment of disease severity    Cough Evaluate for etiology    Dyspnea Evaluate for etiology    Smoking  patient to stop smoking 4 to 8 weeks before surgery   Obesity Provide patient with instructions for incentive spirometry or deep-breathing exercises   Abdominal or thoracic surgery Provide patient with instructions for incentive spirometry or deep-breathing exercises   Malnutrition Laboratory tests based on primary disease, plus albumin and lymphocyte count; if malnutrition is severe, consider postponing surgery and providing preoperative supplementation          Luisana Chun is a 71 y.o. female who  has a past medical history of Hypertension; Kidney disease, chronic, stage III (GFR 30-59 ml/min); Obesity; and Osteoporosis.   who presented for pre op evaluation. The primary encounter diagnosis was Pre-op chest exam. Diagnoses of CKD (chronic kidney disease), stage III and Normocytic anemia were also pertinent to this visit.    As with all procedures, there is inherent risk of multiple complications including those related to bleeding, infectious, cardiac, and pulmonary    Please stop Aspirin and NSAIDS one week prior to surgery.        PT TO BE CLEARED AFTER  PROPER BP CONTROL IS ASSURED - she is coming in Friday for a BP check.     ### UPDATE### Pt returned for BP check and her BP shows better control here. Her Bp is labile, but her control seems adequate at this time and should be followed closely juan-operatively.     Pt is low risk (acceptable) for a low risk surgery.     Preop Evaluation      BP is elevated today. Her BP has been very labile recently, and was very low a few weeks ago (90/60).     Pt also asked for some help with treatment of int rosacea flares will send metronidazole.       6/28/2017 Scott Dave MD  3:45 PM

## 2017-06-29 ENCOUNTER — TELEPHONE (OUTPATIENT)
Dept: FAMILY MEDICINE | Facility: CLINIC | Age: 72
End: 2017-06-29

## 2017-06-29 NOTE — TELEPHONE ENCOUNTER
----- Message from Gabe Ko sent at 6/29/2017 10:44 AM CDT -----  Contact: Felicity/Dr Sandy Blevins is requesting office notes, labs, and test. Pt has an appt on 07/13/17. Please contact her at [phone].    Fax [phone]    Thank

## 2017-06-30 ENCOUNTER — CLINICAL SUPPORT (OUTPATIENT)
Dept: FAMILY MEDICINE | Facility: CLINIC | Age: 72
End: 2017-06-30
Payer: MEDICARE

## 2017-06-30 ENCOUNTER — LAB VISIT (OUTPATIENT)
Dept: LAB | Facility: HOSPITAL | Age: 72
End: 2017-06-30
Attending: FAMILY MEDICINE
Payer: MEDICARE

## 2017-06-30 VITALS
DIASTOLIC BLOOD PRESSURE: 76 MMHG | SYSTOLIC BLOOD PRESSURE: 142 MMHG | HEART RATE: 86 BPM | OXYGEN SATURATION: 98 % | RESPIRATION RATE: 20 BRPM

## 2017-06-30 DIAGNOSIS — Z01.811 PRE-OP CHEST EXAM: ICD-10-CM

## 2017-06-30 DIAGNOSIS — N17.9 AKI (ACUTE KIDNEY INJURY): ICD-10-CM

## 2017-06-30 DIAGNOSIS — Z01.30 BLOOD PRESSURE CHECK: Primary | ICD-10-CM

## 2017-06-30 LAB
ALBUMIN SERPL BCP-MCNC: 3.6 G/DL
ALBUMIN SERPL BCP-MCNC: 3.6 G/DL
ALP SERPL-CCNC: 124 U/L
ALT SERPL W/O P-5'-P-CCNC: 26 U/L
ANION GAP SERPL CALC-SCNC: 7 MMOL/L
ANION GAP SERPL CALC-SCNC: 7 MMOL/L
AST SERPL-CCNC: 20 U/L
BASOPHILS # BLD AUTO: 0.02 K/UL
BASOPHILS NFR BLD: 0.3 %
BILIRUB SERPL-MCNC: 0.3 MG/DL
BUN SERPL-MCNC: 26 MG/DL
BUN SERPL-MCNC: 26 MG/DL
CALCIUM SERPL-MCNC: 9 MG/DL
CALCIUM SERPL-MCNC: 9 MG/DL
CHLORIDE SERPL-SCNC: 115 MMOL/L
CHLORIDE SERPL-SCNC: 115 MMOL/L
CO2 SERPL-SCNC: 16 MMOL/L
CO2 SERPL-SCNC: 16 MMOL/L
CREAT SERPL-MCNC: 1.9 MG/DL
CREAT SERPL-MCNC: 1.9 MG/DL
DIFFERENTIAL METHOD: ABNORMAL
EOSINOPHIL # BLD AUTO: 0.2 K/UL
EOSINOPHIL NFR BLD: 2 %
ERYTHROCYTE [DISTWIDTH] IN BLOOD BY AUTOMATED COUNT: 14.6 %
EST. GFR  (AFRICAN AMERICAN): 30.1 ML/MIN/1.73 M^2
EST. GFR  (AFRICAN AMERICAN): 30.1 ML/MIN/1.73 M^2
EST. GFR  (NON AFRICAN AMERICAN): 26.1 ML/MIN/1.73 M^2
EST. GFR  (NON AFRICAN AMERICAN): 26.1 ML/MIN/1.73 M^2
GLUCOSE SERPL-MCNC: 78 MG/DL
GLUCOSE SERPL-MCNC: 78 MG/DL
HCT VFR BLD AUTO: 35.1 %
HGB BLD-MCNC: 11.3 G/DL
LYMPHOCYTES # BLD AUTO: 1.9 K/UL
LYMPHOCYTES NFR BLD: 24.5 %
MCH RBC QN AUTO: 30.1 PG
MCHC RBC AUTO-ENTMCNC: 32.2 %
MCV RBC AUTO: 93 FL
MONOCYTES # BLD AUTO: 0.8 K/UL
MONOCYTES NFR BLD: 10.4 %
NEUTROPHILS # BLD AUTO: 4.8 K/UL
NEUTROPHILS NFR BLD: 62.5 %
PHOSPHATE SERPL-MCNC: 2.4 MG/DL
PLATELET # BLD AUTO: 250 K/UL
PMV BLD AUTO: 10.2 FL
POTASSIUM SERPL-SCNC: 4.1 MMOL/L
POTASSIUM SERPL-SCNC: 4.1 MMOL/L
PROT SERPL-MCNC: 7.2 G/DL
RBC # BLD AUTO: 3.76 M/UL
SODIUM SERPL-SCNC: 138 MMOL/L
SODIUM SERPL-SCNC: 138 MMOL/L
WBC # BLD AUTO: 7.63 K/UL

## 2017-06-30 PROCEDURE — 99213 OFFICE O/P EST LOW 20 MIN: CPT | Mod: PBBFAC,PO

## 2017-06-30 PROCEDURE — 99999 PR PBB SHADOW E&M-EST. PATIENT-LVL III: CPT | Mod: PBBFAC,,,

## 2017-06-30 NOTE — PROGRESS NOTES
Patient arrived to clinic for BP check. Patient reported taking medication as prescribed. Patient reported no sxs. BP obtained right arm 142/76.

## 2017-07-02 DIAGNOSIS — R19.7 DIARRHEA FOLLOWING GASTROINTESTINAL SURGERY: ICD-10-CM

## 2017-07-02 DIAGNOSIS — Z98.890 DIARRHEA FOLLOWING GASTROINTESTINAL SURGERY: ICD-10-CM

## 2017-07-06 RX ORDER — DIPHENOXYLATE HYDROCHLORIDE AND ATROPINE SULFATE 2.5; .025 MG/1; MG/1
TABLET ORAL
Qty: 180 TABLET | Refills: 0 | Status: SHIPPED | OUTPATIENT
Start: 2017-07-06 | End: 2017-08-16

## 2017-07-12 ENCOUNTER — TELEPHONE (OUTPATIENT)
Dept: PULMONOLOGY | Facility: CLINIC | Age: 72
End: 2017-07-12

## 2017-07-12 ENCOUNTER — NUTRITION (OUTPATIENT)
Dept: NUTRITION | Facility: HOSPITAL | Age: 72
End: 2017-07-12
Attending: SURGERY
Payer: MEDICARE

## 2017-07-12 ENCOUNTER — OFFICE VISIT (OUTPATIENT)
Dept: PODIATRY | Facility: CLINIC | Age: 72
End: 2017-07-12
Payer: MEDICARE

## 2017-07-12 VITALS
SYSTOLIC BLOOD PRESSURE: 157 MMHG | HEART RATE: 76 BPM | WEIGHT: 250 LBS | HEIGHT: 69 IN | DIASTOLIC BLOOD PRESSURE: 81 MMHG | BODY MASS INDEX: 37.03 KG/M2

## 2017-07-12 VITALS — BODY MASS INDEX: 36.3 KG/M2 | WEIGHT: 245.81 LBS

## 2017-07-12 DIAGNOSIS — M20.41 HAMMER TOES OF BOTH FEET: ICD-10-CM

## 2017-07-12 DIAGNOSIS — M20.42 HAMMER TOES OF BOTH FEET: ICD-10-CM

## 2017-07-12 DIAGNOSIS — N18.30 CKD (CHRONIC KIDNEY DISEASE), STAGE III: ICD-10-CM

## 2017-07-12 DIAGNOSIS — M20.31 ACQUIRED HALLUX MALLEUS OF RIGHT FOOT: ICD-10-CM

## 2017-07-12 DIAGNOSIS — Z71.3 PRE-BARIATRIC SURGERY NUTRITION EVALUATION: Primary | ICD-10-CM

## 2017-07-12 DIAGNOSIS — M72.2 PLANTAR FASCIITIS: ICD-10-CM

## 2017-07-12 DIAGNOSIS — M79.672 FOOT PAIN, LEFT: Primary | ICD-10-CM

## 2017-07-12 PROCEDURE — 20550 NJX 1 TENDON SHEATH/LIGAMENT: CPT | Mod: S$PBB,LT,, | Performed by: PODIATRIST

## 2017-07-12 PROCEDURE — 99212 OFFICE O/P EST SF 10 MIN: CPT | Mod: PBBFAC,25,27 | Performed by: DIETITIAN, REGISTERED

## 2017-07-12 PROCEDURE — 99999 PR PBB SHADOW E&M-EST. PATIENT-LVL II: CPT | Mod: PBBFAC,,, | Performed by: DIETITIAN, REGISTERED

## 2017-07-12 PROCEDURE — 1159F MED LIST DOCD IN RCRD: CPT | Mod: ,,, | Performed by: PODIATRIST

## 2017-07-12 PROCEDURE — 99213 OFFICE O/P EST LOW 20 MIN: CPT | Mod: S$PBB,25,, | Performed by: PODIATRIST

## 2017-07-12 PROCEDURE — 1125F AMNT PAIN NOTED PAIN PRSNT: CPT | Mod: ,,, | Performed by: PODIATRIST

## 2017-07-12 PROCEDURE — 99999 PR PBB SHADOW E&M-EST. PATIENT-LVL III: CPT | Mod: PBBFAC,,, | Performed by: PODIATRIST

## 2017-07-12 RX ORDER — DEXAMETHASONE SODIUM PHOSPHATE 4 MG/ML
2 INJECTION, SOLUTION INTRA-ARTICULAR; INTRALESIONAL; INTRAMUSCULAR; INTRAVENOUS; SOFT TISSUE ONCE
Status: COMPLETED | OUTPATIENT
Start: 2017-07-12 | End: 2017-07-12

## 2017-07-12 RX ORDER — LIDOCAINE HYDROCHLORIDE 20 MG/ML
INJECTION, SOLUTION EPIDURAL; INFILTRATION; INTRACAUDAL; PERINEURAL ONCE
Status: CANCELLED | OUTPATIENT
Start: 2017-07-12 | End: 2017-07-12

## 2017-07-12 RX ORDER — LIDOCAINE HYDROCHLORIDE 20 MG/ML
1 INJECTION, SOLUTION EPIDURAL; INFILTRATION; INTRACAUDAL; PERINEURAL ONCE
Status: COMPLETED | OUTPATIENT
Start: 2017-07-12 | End: 2017-07-12

## 2017-07-12 RX ORDER — TRIAMCINOLONE ACETONIDE 40 MG/ML
20 INJECTION, SUSPENSION INTRA-ARTICULAR; INTRAMUSCULAR ONCE
Status: COMPLETED | OUTPATIENT
Start: 2017-07-12 | End: 2017-07-12

## 2017-07-12 RX ORDER — BUPIVACAINE HYDROCHLORIDE 5 MG/ML
1 INJECTION, SOLUTION EPIDURAL; INTRACAUDAL ONCE
Status: COMPLETED | OUTPATIENT
Start: 2017-07-12 | End: 2017-07-12

## 2017-07-12 RX ORDER — DEXAMETHASONE SODIUM PHOSPHATE 4 MG/ML
4 INJECTION, SOLUTION INTRA-ARTICULAR; INTRALESIONAL; INTRAMUSCULAR; INTRAVENOUS; SOFT TISSUE
Status: CANCELLED | OUTPATIENT
Start: 2017-07-12 | End: 2017-07-12

## 2017-07-12 RX ORDER — TRIAMCINOLONE ACETONIDE 40 MG/ML
40 INJECTION, SUSPENSION INTRA-ARTICULAR; INTRAMUSCULAR
Status: CANCELLED | OUTPATIENT
Start: 2017-07-12 | End: 2017-07-12

## 2017-07-12 RX ORDER — BUPIVACAINE HYDROCHLORIDE 5 MG/ML
INJECTION, SOLUTION EPIDURAL; INTRACAUDAL ONCE
Status: CANCELLED | OUTPATIENT
Start: 2017-07-12 | End: 2017-07-12

## 2017-07-12 RX ADMIN — DEXAMETHASONE SODIUM PHOSPHATE 2 MG: 4 INJECTION, SOLUTION INTRAMUSCULAR; INTRAVENOUS at 08:07

## 2017-07-12 RX ADMIN — LIDOCAINE HYDROCHLORIDE 20 MG: 20 INJECTION, SOLUTION EPIDURAL; INFILTRATION; INTRACAUDAL; PERINEURAL at 08:07

## 2017-07-12 RX ADMIN — BUPIVACAINE HYDROCHLORIDE 5 MG: 5 INJECTION, SOLUTION EPIDURAL; INTRACAUDAL; PERINEURAL at 08:07

## 2017-07-12 RX ADMIN — TRIAMCINOLONE ACETONIDE 20 MG: 40 INJECTION, SUSPENSION INTRA-ARTICULAR; INTRAMUSCULAR at 08:07

## 2017-07-12 NOTE — PROGRESS NOTES
NUTRITION NOTE    Referring Physician: Dr. Goff  Reason for MNT Referral: Medically Supervised Diet pending Gastric Sleeve    PAST MEDICAL HISTORY:    Patient presents for 3rd visit for MSD with weight loss over the past month; total weight loss by making numerous dietary and lifestyle changes.    Past Medical History:   Diagnosis Date    Hypertension     Kidney disease, chronic, stage III (GFR 30-59 ml/min)     Obesity     Osteoporosis        CLINICAL DATA:  71 y.o. female.    There were no vitals filed for this visit.    Current Weight: 245 lbs  Weight Change Since Initial Visit: -8 lbs  Ideal Body Weight: 169 lbs  Body mass index is 36.3 kg/m².    CURRENT DIET:  Reduced-calorie diet.  Diet Recall: Food records are not present.  24 hr recall:   Breakfast: yogurt (single serving pack) + banana + coffee  Lunch: Mixed salad (Lettuce, tomato, turkey breast salad) + 2 tb akhil dressing + orange juice  Dinner: Steamed Fish (6 oz) + 1 can beans   Snack: Ensure  Other: Drinks water through out the day.     Diet Includes: x3 days   Meal Pattern: Improved.  Protein Supplements: x1 per day.  Snacking: Adequate. Snacks include healthy choices. (yogurt , fresh)     Vegetables: Likes a variety. Eats daily.  Fruits: Likes a variety. Eats daily.  Beverages: water, sugary beverages, sugar-free beverages, coffee without sugar and 2% milk  Dining Out: Dining out: Never. Mostly fast food, restaurants and take-out.  Cooking at home: Daily. Mostly baked and grilled meat, fish and vegetables.    CURRENT EXERCISE:  None (Bone spur issues with left heel, unable to exercise at this time)    Vitamins / Minerals / Herbs:   MVI, iron, zinc,     Food Allergies:   None    Social:  Retired.  Alcohol: None.  Smoking: None.    ASSESSMENT:  Patient demonstrates all willingness to change lifestyle habits as evidenced by weight loss, dietary changes, including protein drinks, increased fruits, increased vegetables, reduced dining out, more  food preparation at [employer], healthier cooking at home and healthier snacking at home.    Doing fairly well with working on greatest challenges. Patient doing well with protein at meals, and avoiding starching carbs. Patient stated she is doing better at self discipline. Needs to increase meal frequency and quit drinking ensure. Also reviewed vitamins and minerals with patient. Expect different protein supplement use and increased use, along with appropriate vitamins minerals at next appointment.     Adequate calorie intake.  Adequate protein intake.    PLAN:    Diet: Adjust diet plan. See above.     Exercise: Increase.    Behavior Modification: Continue to document food & activity logs daily.    Weight loss prior to surgery (5-10% TBW): 12-24 lbs    Return to clinic in one month.    SESSION TIME:  45 minutes

## 2017-07-12 NOTE — PROGRESS NOTES
Subjective:      Patient ID: Luisana Chun is a 71 y.o. female.    Chief Complaint: Consult (wants surgery pcp Dr. Dave 6-28-17)    Luisana Chun is a 71 y.o. female who returns to the podiatry clinic  with complaint of continued left foot heel pain. She has not been icing, stretching or wearing supportive shoes.  She relates that the last injection did not last over 24 hours and she now wants surgery.     Patient admits to barefoot walking often in and around home    Current shoe gear:  [facility]    Patient Active Problem List   Diagnosis    Osteoporosis without pathological fracture    Menopausal state    CKD (chronic kidney disease), stage III    Secondary hyperparathyroidism    Hypertension, uncontrolled    Obesity, Class II, BMI 35-39.9    Vitamin D deficiency    Normocytic anemia    Rosacea     Current Outpatient Prescriptions on File Prior to Visit   Medication Sig Dispense Refill    calcitRIOL (ROCALTROL) 0.5 MCG Cap Take 1 capsule (0.5 mcg total) by mouth once daily. 90 capsule 0    CHONDROITIN SULFATE A (CHONDROITIN SULFATE ORAL) Take by mouth once daily at 6am.       clonazePAM (KLONOPIN) 1 MG tablet Take 1 tablet (1 mg total) by mouth nightly as needed. 30 tablet 2    diphenoxylate-atropine 2.5-0.025 mg (LOMOTIL) 2.5-0.025 mg per tablet TAKE TWO TABLETS BY MOUTH THREE TIMES DAILY AS NEEDED 180 tablet 0    ergocalciferol (ERGOCALCIFEROL) 50,000 unit Cap Take 1 capsule (50,000 Units total) by mouth once daily. 5 capsule 0    estradiol (ESTRACE) 0.5 MG tablet Take 1 tablet (0.5 mg total) by mouth once daily. 30 tablet 11    fluoxetine (PROZAC) 40 MG capsule Take 1 capsule (40 mg total) by mouth every morning. (Patient taking differently: Take 40 mg by mouth once daily. ) 90 capsule 3    irbesartan (AVAPRO) 300 MG tablet TAKE ONE TABLET BY MOUTH IN THE EVENING 30 tablet 0    medroxyPROGESTERone (PROVERA) 2.5 MG tablet Take 1 tablet (2.5 mg total) by mouth once daily. 30 tablet 11     metronidazole (METROGEL) 0.75 % gel Apply topically 2 (two) times daily. 45 g 2    PROCTO-RAZA 1 % crpe 1 applicator as needed.       sodium bicarbonate 325 MG tablet Take 325 mg by mouth 4 (four) times daily.      trazodone (DESYREL) 50 MG tablet Take 1 tablet (50 mg total) by mouth every evening. 90 tablet 2    zolpidem (AMBIEN) 5 MG Tab Take 5 mg by mouth nightly as needed.       No current facility-administered medications on file prior to visit.      Review of patient's allergies indicates:  No Known Allergies  Past Surgical History:   Procedure Laterality Date    APPENDECTOMY      BACK SURGERY  february 2016    COLONOSCOPY      ESOPHAGOGASTRODUODENOSCOPY      KNEE SURGERY Bilateral     left toe Left     TONSILLECTOMY       Family History   Problem Relation Age of Onset    Hypertension Mother     Heart disease Mother     Macular degeneration Mother     Hypertension Father     Cataracts Father     Glaucoma Father     Macular degeneration Father     Glaucoma Brother     Macular degeneration Brother     No Known Problems Sister     No Known Problems Maternal Aunt     No Known Problems Maternal Uncle     No Known Problems Paternal Aunt     No Known Problems Paternal Uncle     Macular degeneration Maternal Grandmother     Macular degeneration Maternal Grandfather     Macular degeneration Paternal Grandmother     Glaucoma Paternal Grandfather     Macular degeneration Paternal Grandfather     Amblyopia Neg Hx     Blindness Neg Hx     Cancer Neg Hx     Diabetes Neg Hx     Retinal detachment Neg Hx     Strabismus Neg Hx     Stroke Neg Hx     Thyroid disease Neg Hx      Social History     Social History    Marital status:      Spouse name: N/A    Number of children: N/A    Years of education: N/A     Occupational History    Not on file.     Social History Main Topics    Smoking status: Former Smoker    Smokeless tobacco: Never Used    Alcohol use 0.0 oz/week       "Comment: social    Drug use: No    Sexual activity: Not Currently     Partners: Male      Comment: 5/4/17 single     Other Topics Concern    Not on file     Social History Narrative    No narrative on file       Review of Systems   Constitution: Negative for chills, fever and weakness.   Cardiovascular: Positive for leg swelling. Negative for claudication.   Respiratory: Negative for cough and shortness of breath.    Skin: Positive for dry skin and suspicious lesions (left forefoot). Negative for itching, nail changes and rash.   Musculoskeletal: Positive for arthritis, back pain, joint pain and myalgias. Negative for falls, joint swelling and muscle weakness.   Gastrointestinal: Negative for diarrhea, nausea and vomiting.   Neurological: Positive for numbness and paresthesias. Negative for tremors.   Psychiatric/Behavioral: Negative for altered mental status and hallucinations.           Objective:       Vitals:    07/12/17 1341   BP: (!) 157/81   Pulse: 76   Weight: 113.4 kg (250 lb)   Height: 5' 9" (1.753 m)   PainSc: 10-Worst pain ever   PainLoc: Foot       Physical Exam   Constitutional: She appears well-developed and well-nourished. No distress.   Alert and oriented x 3. No evidence of depression, anxiety, or agitation. Calm, cooperative, and communicative. Appropriate interactions and affect.       Cardiovascular:   Pulses:       Dorsalis pedis pulses are 2+ on the right side, and 2+ on the left side.        Posterior tibial pulses are 1+ on the right side, and 1+ on the left side.   Dorsalis pedis and posterior tibial pulses are diminished Bilaterally.     There is decreased digital hair. Skin is atrophic, slightly hyperpigmented    1+ pitting edema           Musculoskeletal:        Right ankle: She exhibits no swelling and no ecchymosis. No tenderness. Achilles tendon exhibits no pain.        Left ankle: She exhibits no swelling and no ecchymosis. No tenderness. Achilles tendon exhibits no pain.        " Right foot: There is normal range of motion, no swelling and normal capillary refill.        Left foot: There is tenderness (plantar medial heel) and swelling. There is normal capillary refill.    Muscle strength is 5/5 in all groups bilaterally.    Decreased stride, station of gait.  apropulsive toe off.  Increased angle and base of gait.    Patient has hammertoes of digits 2-5 bilateral partially reducible without symptom today. Hallux malleus of the right foot    Fat pad atrophy to heels and met heads bilateral    Biomechanical exam: Pain on palpation left medial calcaneal tubercle at origin of plantar fascia. There is equinus deformity bilateral with decreased dorsiflexion at the ankle joint bilateral. No tenderness with compression of heel. Negative tinels sign. Gait analysis reveals excessive pronation through midstance and propulsion with early heel off. Shoes reveals lateral heel counter wear bilateral    Lymphadenopathy:   No lymphatic streaking    Negative lymphadenopathy bilateral popliteal fossa and tarsal tunnel.     Neurological:   [place] 5.07 monofilament is intact bilateral feet. Sharp/dull sensation is also intact Bilateral feet.    Paresthesias, and hyperesthesia bilateral feet at toes with no clearly identified trigger or source.       Skin: Skin is warm, dry and intact. No abrasion, no ecchymosis and no rash noted. She is not diaphoretic. No cyanosis or erythema. No pallor. Nails show no clubbing.   Psychiatric: She has a normal mood and affect. Her speech is normal and behavior is normal.   Nursing note and vitals reviewed.          Assessment:       Encounter Diagnoses   Name Primary?    Foot pain, left Yes    Plantar fasciitis - Left Foot     Acquired hallux malleus of right foot     Hammer toes of both feet     CKD (chronic kidney disease), stage III          Plan:       Luisana was seen today for consult.    Diagnoses and all orders for this visit:    Foot pain, left    Plantar  fasciitis - Left Foot    Acquired hallux malleus of right foot    Hammer toes of both feet    CKD (chronic kidney disease), stage III    Other orders  -     bupivacaine (PF) 0.5% (5 mg/ml) injection 5 mg; Inject 1 mL (5 mg total) into the skin once.  -     dexamethasone injection 2 mg; 0.5 mLs (2 mg total) by Other route once.  -     triamcinolone acetonide injection 20 mg; 0.5 mLs (20 mg total) by Other route once.  -     lidocaine (PF) 20 mg/ml (2%) injection 20 mg; 1 mL (20 mg total) by Other route once.  -     Cancel: bupivacaine (PF) 0.5% (5 mg/ml) injection; by Intrapleural route once.  -     Cancel: dexamethasone injection 4 mg; Inject 1 mL (4 mg total) into the vein one time.  -     Cancel: lidocaine (PF) 20 mg/ml (2%) injection; by Other route once.  -     Cancel: triamcinolone acetonide injection 40 mg; Inject 1 mL (40 mg total) into the muscle one time.      I counseled the patient on her conditions, their implications and medical management.    Shoe inspection. Foot Education. Patient instructed on proper foot hygeine. We discussed wearing proper shoe gear, daily foot inspections, never walking without protective shoe gear, never putting sharp instruments to feet    Discussed different treatment options for heel pain. I gave written and verbal instructions on stretching exercises. Patient expressed understanding. Discussed icing the affected area as needed and also wearing appropriate shoe gear and avoiding flats, slippers, sandals, and going barefoot. My recommendation for OTC supports is [facility]. Patient instructed on adequate icing techniques. Patient should ice the affected area at least once per day I advised the patient that extra icing would also be beneficial to ensure adequate anti inflammatory effect. We also discussed cortisone injections and NSAID therapy.    Discussed patient noncompliance in detail.  Discussed risks associated with foot surgery and the importance of post op  compliance.  For these reasons I informed patient that I would not do an elective surgery.  She has not been compliant with any of the conservative treatment.    Patient would like another injection today.  With patient's permission,  a cortisone injection was performed at the left foot, medial approach, using 3ccs of mixture of (1cc 1% plain Lidocaine : 1cc 0.5% Marcaine plain:  0.5cc kenalog-40 : 0.5cc dexamethasone).   This was well tolerated.         Pt to RTC  PRN

## 2017-07-12 NOTE — TELEPHONE ENCOUNTER
lvm for pt to call [facility] back to set up for cpap machine   [facility] number- [phone]

## 2017-07-12 NOTE — TELEPHONE ENCOUNTER
----- Message from Tahira Fried sent at 7/12/2017  1:08 PM CDT -----  Contact: Pt in person  Pt was never contacted regarding CPap machine. Please call [phone].

## 2017-07-12 NOTE — PATIENT INSTRUCTIONS
Recommend lotions: eucerin, aquaphor, A&D ointment, gold bond for diabetics, sween    Shoe recommendations: (try 6pm.com, zappos.[employer] , nordstromrack.[employer], or shoes.[employer] for discounted prices) you can visit [employer]SW shoes in [place] as well    Asics (GT 1000 or gel foundations), new balance, saucony (stabil c3),  Christie (transcend), vionic, propet (tennis shoe)    soft brand, clarks, crocs, aerosoles, naturalizers, SAS, ecco, jovani, gopal joseph, rockports (dress shoes)    Vionic, volitiles, burkenstocks, fitflops, propet (sandals)    Nike comfort thong sandals, crocs (house shoes)    Nail Home remedy:  Vicks Vapor rub OR Listerine and apple cider vinegar in a spray bottle to nails    Occasional soaks for 15-20 mins in luke warm water with 1 cup of listerine and 1 cup of apple cider vinegar are ok You may add several drops of oil of oregano or tea tree oil as well      Understanding Plantar Fasciitis    Plantar fasciitis is a condition that causes foot and heel pain. The plantar fascia is a tough band of tissue that runs across the bottom of the foot from the heel to the toes. This tissue pulls on the heel bone. It supports the arch of the foot as it pushes off the ground. If the tissue becomes irritated or red and swollen (inflamed), it is called plantar fasciitis.  How to say it  PLAN-tuhr fa-see-IY-tis   What causes plantar fasciitis?  Plantar fasciitis most often occurs from overusing the plantar fascia. The tissue may become damaged from activities that put repeated stress on the heel and foot. Or it may wear down over time with age and ankle stiffness. You are more likely to have plantar fasciitis if you:  · Do activities that require a lot of running, jumping, or dancing  · Have a job that requires being on your feet for long periods  · Are overweight or obese  · Have certain foot problems, such as a tight Achilles tendon, flat feet, or high arches  · Often wear poorly fitting shoes  Symptoms of plantar fasciitis  The  condition most often causes pain in the heel and the bottom of the foot. The pain may occur when you take your first steps in the morning. It may get better as you walk throughout the day. But as you continue to put weight on the foot, the pain often returns. Pain may also occur after standing or sitting for long periods.  Treating plantar fasciitis  Treatments for plantar fasciitis include:  · Resting the foot. This involves limiting movements that make your foot hurt. You may also need to avoid certain sports and types of work for a time.  · Using cold packs. Put an ice pack on the heel and foot to help reduce pain and swelling.  · Taking pain medicines. Prescription and over-the-counter pain medicines can help relieve pain and swelling.  · Using heel cups or foot inserts (orthotics). These are placed in the shoes to help support the heel or arch and cushion the heel. You may also be told to buy proper-fitting shoes with good arch support and cushioned soles.  · Taping the foot. This supports the arch and limits the movement of the plantar fascia to help relieve symptoms.  · Wearing a night splint. This stretches the plantar fascia and leg muscles while you sleep. This may help relieve pain.  · Doing exercises and physical therapy. These stretch and strengthen the plantar fascia and the muscles in the leg that support the heel and foot.  · Getting shots of medicine into the foot. These may help relieve symptoms for a time.  · Having surgery. This may be needed if other treatments fail to relieve symptoms. During surgery, the surgeon may partially cut the plantar fascia to release tension.  Possible complications of plantar fasciitis  Without proper care and treatment, healing may take longer than normal. Also, symptoms may continue or get worse. Over time, the plantar fascia may be damaged. This can make it hard to walk or even stand without pain.  When to call your healthcare provider  Call your healthcare  provider right away if you have any of these:  · Fever of 100.4°F (38°C) or higher, or as directed  · Symptoms that dont get better with treatment, or get worse  · New symptoms, such as numbness, tingling, or weakness in the foot   © 4646-7886 [employer]. [address] [place] [address], [place] [address]. All rights reserved. This information is not intended as a substitute for professional medical care. Always follow your healthcare professional's instructions.        Treating Plantar Fasciitis  First, your healthcare provider tries to determine the cause of your problem in order to suggest ways to relieve pain. If your pain is due to poor foot mechanics, custom-made shoe inserts (orthoses) may help.    Reduce symptoms  · To relieve mild symptoms, try aspirin, ibuprofen, or other medicines as directed. Rubbing ice on the affected area may also help.  · To reduce severe pain and swelling, your healthcare provider may prescribe pills or injections or a walking cast in some instances. Physical therapy, such as ultrasound or a daily stretching program, may also be recommended. Surgery is rarely required.  · To reduce symptoms caused by poor foot mechanics, your foot may be taped. This supports the arch and temporarily controls movement. Night splints may also help by stretching the fascia.  Control movement  If taping helps, your healthcare provider may prescribe orthoses. Built from plaster casts of your feet, these inserts control the way your foot moves. As a result, your symptoms should go away.  Reduce overuse  Every time your foot strikes the ground, the plantar fascia is stretched. You can reduce the strain on the plantar fascia and the possibility of overuse by following these suggestions:  · Lose any excess weight.  · Avoid running on hard or uneven ground.  · Use orthoses at all times in your shoes and house slippers.  If surgery is needed  Your healthcare provider may consider surgery if other types  of treatment don't control your pain. During surgery, the plantar fascia is partially cut to release tension. As you heal, fibrous tissue fills the space between the heel bone and the plantar fascia.   © 2849-5222 [employer]. [address] [place] [address], [place] [address]. All rights reserved. This information is not intended as a substitute for professional medical care. Always follow your healthcare professional's instructions.        Wearing Proper Shoes                    You walk on your feet every day, forcing them to support the weight of your body. Repeated stress on your feet can cause damage over time. The right shoes can help protect your feet. The wrong shoes can cause more foot problems. Read the information below to help you find a shoe that fits your foot needs.     A good shoe fit will cover your foot outline.       A shoe that doesnt cover the outline is a bad fit.      Whats Your Foot Shape?  To get a good fit, you need to know the shape of your foot. Do this simple test: While standing, place your foot on a piece of paper and trace around it. Is your foot straight or curved? Do you have a foot problem, such as a bunion, that causes your foot outline to show a bulge on the side of your big toe?  Finding Your Fit  Bring your foot outline to the [employer] store to help you find the right shoe. Place a shoe you like on top of the outline to see if it matches the shape. The shoe should cover the outline. (If you have a bunion, the shoe may not cover the bulge on the outline. Look for soft leather shoes to stretch over the bunion.) Once youve found a pair of proper shoes, put them on. Walk around. Be sure the shoes dont rub or pinch. If the shoes feel good, youve found your fit!  The Right Shoe for You  A good shoe has features that provide comfort and support. It must also be the right size and shape for your feet. Look for a shoe made of breathable fabric and lining, such as leather or  canvas. Be sure that shoes have enough tread to prevent slipping. Go to a good shoe store for help finding the right shoe.  Good Shoe Features  An ideal shoe has the following:  · Laces for support. If tying laces is a problem for you, try shoes with Velcro fasteners or nata.  · A front of the shoe (toe box) with ½ inch space in front of your longest toes.  · An arch shape that supports your foot.  · No more than 1½ inches of heel.  · A stiff, snug back of the shoe to keep your foot from sliding around.  · A smooth lining with no rough seams.  Shoe Shopping Tips  Below are some dos and donts for when you go to the shoe store.  Do:  · Select the shoes that feel right. Wear them around the house. Then bring them to your foot doctor to check for fit. If they dont fit well, return them.  · Shop late in the day, when your feet will be slightly bigger.  · Each time you buy shoes, have both your feet measured while you are standing. Foot size changes with time.  · Pick shoes to suit their purpose. High heels are okay for an occasional night on the town. But for everyday wear, choose a more sensible shoe.  · Try on shoes while wearing any inserts specially made for your feet (orthoses).  · Try on both the right and left shoes. If your feet are different sizes, pick a pair that fits the larger foot.  Dont:  · Dont buy shoes based on shoe size alone. Always try on shoes, as sizes differ from brand to brand and within brands.  · Dont expect shoes to break in. If they dont fit at the store, dont buy them.  · Dont buy a shoe that doesnt match your foot shape.  What About Socks?  Always wear socks with shoes. Socks help absorb sweat and reduce friction and blistering. When shopping for shoes, choose soft, padded socks with seams that dont irritate your feet.  If You Have Foot Problems  Some foot problems cause deformities. This can make it hard to find a good fit. Look for shoes made of soft leather to stretch  over the deformity. If you have bunions, buy shoes with a wider toe box. To fit hammertoes, look for shoes with a tall toe box. If you have arch problems, you may need inserts. In some cases, youll need to have custom footwear or orthoses made for your feet.  Suggested Footwear  Ask your healthcare provider what kind of footwear you need. He or she may recommend a certain brand or shoe store.  © 3403-1516 [employer]. [address] [place] [address]. All rights reserved. This information is not intended as a substitute for professional medical care. Always follow your healthcare professional's instructions.        Toe Extension (Flexibility)    These instructions are for your right foot. Switch sides for your left foot.  1. Sit in a chair. Rest your right ankle on your left knee.  2. Hold your toes with your right hand. Gently bend the toes backward. Feel a stretch in the undersides of the toes and ball of the foot. Hold for 30 to 60 seconds.  3. Then gently bend the toes in the other direction. Gently press on them until your foot is pointed. Hold for 30 to 60 seconds.  4. Repeat 5 times, or as instructed.  © 8972-9785 [employer]. [address] [place] [address]. All rights reserved. This information is not intended as a substitute for professional medical care. Always follow your healthcare professional's instructions.      Ankle Alphabet (Flexibility)    These instructions are for your right foot. Switch sides for your left foot.  5. Sit on the floor with your legs straight in front of you.  6. Rest your right calf on a rolled-up towel. Use your foot to write the letters of the alphabet in mid-air.  7. Repeat this exercise 3 times a day, or as instructed.  Date Last Reviewed: 3/10/2016  © 4325-6947 [employer]. [address] [place] [address]. All rights reserved. This information is not intended as a substitute for professional medical  care. Always follow your healthcare professional's instructions.        Calf Raise (Strength)    8. Stand up straight with both feet flat on the floor, slightly apart. Hold onto a sturdy chair, railing, counter, or table.  9. Raise both heels so youre standing on the balls of your feet. Dont lock your knees or arch your back. Hold for 5 seconds. Then slowly lower your heels back down to the floor.  10. Repeat 10 times, or as instructed.  11. Do this exercise 3 times a day, or as instructed.     Challenge yourself  As you become stronger, do this exercise on one foot at a time.   Date Last Reviewed: 3/10/2016  © 5007-5565 [employer]. [address] [place] [address]. All rights reserved. This information is not intended as a substitute for professional medical care. Always follow your healthcare professional's instructions.        Bent-Knee Calf Stretch  This exercise is designed to stretch and strengthen your feet and ankles. Before beginning the exercise, read through all the instructions. While exercising, breathe normally and dont bounce. If you feel any pain, stop the exercise. If pain persists, inform your healthcare provider:    · Stand an arms length away from a wall. Place the palms of your hands on the wall. Step forward about 12 inches with your ______ foot.  · Keeping toes pointed forward and both heels on the floor, bend both knees and lean forward. Hold for ______ seconds. Relax.  · Repeat ______ times. Do ______ sets a day.  Date Last Reviewed: 8/16/2015  © 4677-8202 [employer]. [address] [place] [address]. All rights reserved. This information is not intended as a substitute for professional medical care. Always follow your healthcare professional's instructions.        Arch retraining    These exercises are for your right foot. Switch sides for your left foot.  12. Sit in a chair or stand with both feet flat on the floor. Press down with the  ball of your right foot, but only on the left side of the foot, just under the big toe.  13. Then pull the bottom of your big toe back toward your heel. This should pull up the arch of your foot. Dont flex your toes while doing this. It is a subtle movement of the arch.  14. Hold for 5 seconds. Relax.  Date Last Reviewed: 3/10/2016  © 6355-5903 [employer]. [address] [place] [address]. All rights reserved. This information is not intended as a substitute for professional medical care. Always follow your healthcare professional's instructions.        Ankle Dorsiflexion/Plantarflexion (Flexibility)    15. Sit on the floor or in bed with your legs straight in front of you.  16. Point both feet. Then flex both feet.  17. Do this 10 to 30 times in a row.  18. Repeat this exercise 2 times a day, or as instructed.  Date Last Reviewed: 5/1/2016 © 2000-2016 [employer]. [address] [place] [address]. All rights reserved. This information is not intended as a substitute for professional medical care. Always follow your healthcare professional's instructions.

## 2017-07-13 ENCOUNTER — TELEPHONE (OUTPATIENT)
Dept: FAMILY MEDICINE | Facility: CLINIC | Age: 72
End: 2017-07-13

## 2017-07-13 DIAGNOSIS — L71.9 ROSACEA: Primary | ICD-10-CM

## 2017-07-13 RX ORDER — TRETINOIN 0.25 MG/G
CREAM TOPICAL NIGHTLY
Qty: 45 G | Refills: 0 | Status: SHIPPED | OUTPATIENT
Start: 2017-07-13 | End: 2017-08-16

## 2017-07-13 NOTE — TELEPHONE ENCOUNTER
Pharmacist states that pt states metrogel is too expensive; pharmacist states retin-A would be cheaper if you want to send in new prescription

## 2017-07-13 NOTE — TELEPHONE ENCOUNTER
"Fax from [facility] pharmacy regarding metronidazole (METROGEL) 0.75 % gel, stated "Pt stated that she's requested retina cream instead of metrogel due to high cost. Please advise thanks. "  "

## 2017-07-13 NOTE — TELEPHONE ENCOUNTER
----- Message from Karely Treviño sent at 7/13/2017  2:40 PM CDT -----  Contact: Felicity with Dr Delgado's office   Requesting a Dexa scan result to be faxed . The patient is there now .    [phone]    Fax # [phone]

## 2017-07-13 NOTE — TELEPHONE ENCOUNTER
----- Message from Kathleen Washington sent at 7/13/2017  1:28 PM CDT -----  Contact: [facility] pharmacy  Pt calling to speak to a nurse regarding medication below. Pt states that the gel is too expensive. Please call [phone] .      metronidazole (METROGEL) 0.75 % gel

## 2017-07-13 NOTE — TELEPHONE ENCOUNTER
I informed Felicity dexa scan results are from 02/2016; pt not scheduled to repeat until 02/2018; Felicity verbalized understanding; states she already has those results, does not need it faxed

## 2017-07-20 ENCOUNTER — OFFICE VISIT (OUTPATIENT)
Dept: PSYCHIATRY | Facility: CLINIC | Age: 72
End: 2017-07-20
Payer: MEDICARE

## 2017-07-20 DIAGNOSIS — Z01.818 PREOP EXAMINATION: Primary | ICD-10-CM

## 2017-07-20 DIAGNOSIS — G47.30 SLEEP APNEA, UNSPECIFIED TYPE: ICD-10-CM

## 2017-07-20 DIAGNOSIS — E66.01 MORBID OBESITY DUE TO EXCESS CALORIES: ICD-10-CM

## 2017-07-20 PROCEDURE — 90791 PSYCH DIAGNOSTIC EVALUATION: CPT | Mod: S$PBB,,, | Performed by: SOCIAL WORKER

## 2017-07-20 PROCEDURE — 90791 PSYCH DIAGNOSTIC EVALUATION: CPT | Mod: PBBFAC,PO | Performed by: SOCIAL WORKER
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
## 2017-07-22 DIAGNOSIS — K64.8 INTERNAL HEMORRHOID, BLEEDING: ICD-10-CM

## 2017-07-22 DIAGNOSIS — N18.30 CKD (CHRONIC KIDNEY DISEASE), STAGE III: ICD-10-CM

## 2017-07-22 DIAGNOSIS — I10 BENIGN ESSENTIAL HYPERTENSION: ICD-10-CM

## 2017-07-22 DIAGNOSIS — F51.01 PRIMARY INSOMNIA: ICD-10-CM

## 2017-07-24 RX ORDER — IRBESARTAN 300 MG/1
TABLET ORAL
Qty: 30 TABLET | Refills: 0 | Status: SHIPPED | OUTPATIENT
Start: 2017-07-24 | End: 2017-08-10

## 2017-07-27 RX ORDER — ZOLPIDEM TARTRATE 5 MG/1
TABLET ORAL
Qty: 30 TABLET | Refills: 0 | OUTPATIENT
Start: 2017-07-27

## 2017-08-03 ENCOUNTER — TELEPHONE (OUTPATIENT)
Dept: PULMONOLOGY | Facility: CLINIC | Age: 72
End: 2017-08-03

## 2017-08-03 NOTE — TELEPHONE ENCOUNTER
Spoke with pt. Pt is wondering if she needs to be seen by Dr. Murdock. I am sending a message to Dr. Murdock.

## 2017-08-03 NOTE — TELEPHONE ENCOUNTER
----- Message from Ila Ryan sent at 8/3/2017  9:45 AM CDT -----  Contact: BLAYNE HURTADO [[id number]]  x_  1st Request  _  2nd Request  _  3rd Request        Who: BLAYNE HURTADO [[id number]]    Why: returning call     What Number to Call Back: [phone]    When to Expect a call back: (With in 24 hours)

## 2017-08-04 DIAGNOSIS — F51.01 PRIMARY INSOMNIA: ICD-10-CM

## 2017-08-10 ENCOUNTER — TELEPHONE (OUTPATIENT)
Dept: FAMILY MEDICINE | Facility: CLINIC | Age: 72
End: 2017-08-10

## 2017-08-10 DIAGNOSIS — N18.30 CKD (CHRONIC KIDNEY DISEASE), STAGE III: ICD-10-CM

## 2017-08-10 DIAGNOSIS — G47.00 INSOMNIA, UNSPECIFIED TYPE: Primary | ICD-10-CM

## 2017-08-10 DIAGNOSIS — I10 BENIGN ESSENTIAL HYPERTENSION: ICD-10-CM

## 2017-08-10 DIAGNOSIS — E55.9 HYPOVITAMINOSIS D: ICD-10-CM

## 2017-08-10 RX ORDER — ZOLPIDEM TARTRATE 5 MG/1
5 TABLET ORAL NIGHTLY PRN
OUTPATIENT
Start: 2017-08-10

## 2017-08-10 RX ORDER — CALCITRIOL 0.5 UG/1
CAPSULE ORAL
Qty: 90 CAPSULE | Refills: 0 | Status: SHIPPED | OUTPATIENT
Start: 2017-08-10 | End: 2017-11-06 | Stop reason: SDUPTHER

## 2017-08-10 RX ORDER — IRBESARTAN 300 MG/1
TABLET ORAL
Qty: 30 TABLET | Refills: 0 | Status: SHIPPED | OUTPATIENT
Start: 2017-08-10 | End: 2017-12-18 | Stop reason: SDUPTHER

## 2017-08-10 NOTE — TELEPHONE ENCOUNTER
Medication was sent in today for 30 day supply; called pharmacy and changed to 90 day supply per ok from Marie Cadena

## 2017-08-10 NOTE — TELEPHONE ENCOUNTER
----- Message from Kathleen Washington sent at 8/10/2017 11:39 AM CDT -----  Contact: Self  Refill : irbesartan (AVAPRO) 300 MG tablet   (PT REQUESTING A 90 DAY)      Pharmacy     [address], [address] [place]

## 2017-08-10 NOTE — TELEPHONE ENCOUNTER
Patient requesting a refill on ambien, script never rx by . Please advise, thank you.  LOV 6/28/17   Last ambien script 6/8/17 #30 by Dr.Lombard

## 2017-08-11 ENCOUNTER — TELEPHONE (OUTPATIENT)
Dept: FAMILY MEDICINE | Facility: CLINIC | Age: 72
End: 2017-08-11

## 2017-08-11 NOTE — TELEPHONE ENCOUNTER
----- Message from Lacy Smith sent at 8/11/2017  9:29 AM CDT -----  Contact: Shira Bartholomew  Patient need refill     zolpidem (AMBIEN) 5 MG Tab    [facility] [phone]

## 2017-08-14 ENCOUNTER — TELEPHONE (OUTPATIENT)
Dept: FAMILY MEDICINE | Facility: CLINIC | Age: 72
End: 2017-08-14

## 2017-08-14 NOTE — TELEPHONE ENCOUNTER
----- Message from Agueda Varghese sent at 8/14/2017  9:19 AM CDT -----  Contact: [facility] [phone]  Pt is requesting refills on zolpidem (AMBIEN) 5 MG Tab Thanks !

## 2017-08-15 ENCOUNTER — OFFICE VISIT (OUTPATIENT)
Dept: SLEEP MEDICINE | Facility: CLINIC | Age: 72
End: 2017-08-15
Payer: MEDICARE

## 2017-08-15 VITALS
OXYGEN SATURATION: 97 % | DIASTOLIC BLOOD PRESSURE: 80 MMHG | SYSTOLIC BLOOD PRESSURE: 138 MMHG | HEIGHT: 69 IN | WEIGHT: 251.31 LBS | BODY MASS INDEX: 37.22 KG/M2 | HEART RATE: 75 BPM

## 2017-08-15 DIAGNOSIS — E66.01 MORBID OBESITY DUE TO EXCESS CALORIES: ICD-10-CM

## 2017-08-15 DIAGNOSIS — G47.33 OSA (OBSTRUCTIVE SLEEP APNEA): Primary | ICD-10-CM

## 2017-08-15 PROCEDURE — 1159F MED LIST DOCD IN RCRD: CPT | Mod: ,,, | Performed by: INTERNAL MEDICINE

## 2017-08-15 PROCEDURE — 3079F DIAST BP 80-89 MM HG: CPT | Mod: ,,, | Performed by: INTERNAL MEDICINE

## 2017-08-15 PROCEDURE — 3075F SYST BP GE 130 - 139MM HG: CPT | Mod: ,,, | Performed by: INTERNAL MEDICINE

## 2017-08-15 PROCEDURE — 1126F AMNT PAIN NOTED NONE PRSNT: CPT | Mod: ,,, | Performed by: INTERNAL MEDICINE

## 2017-08-15 PROCEDURE — 99214 OFFICE O/P EST MOD 30 MIN: CPT | Mod: S$PBB,,, | Performed by: INTERNAL MEDICINE

## 2017-08-15 PROCEDURE — 99999 PR PBB SHADOW E&M-EST. PATIENT-LVL III: CPT | Mod: PBBFAC,,, | Performed by: INTERNAL MEDICINE

## 2017-08-15 PROCEDURE — 99213 OFFICE O/P EST LOW 20 MIN: CPT | Mod: PBBFAC,PO | Performed by: INTERNAL MEDICINE

## 2017-08-15 NOTE — PROGRESS NOTES
Luisana Chun  was seen as a follow up.     CHIEF COMPLAINT:    Chief Complaint   Patient presents with    Sleep Apnea       HISTORY OF PRESENT ILLNESS: Luisana Chun is a 71 y.o. female is here for sleep evaluation.   Patient is interested in bariatric surgery.  Patient sleep alone and is not sure if she snores.  No witnessed apnea.  Feeling rested upon awake.  No parasomnia.  No cataplexy.  Patient was told to have raheel based sleep study in 2013 in [place].  Did not tolerate.  No weigh changes.      +frequent cramping upon awake.        B[place] Sleepiness Scale score during initial sleep evaluation was 3.    S/p sleep study since last visit 5/26/17 with ahi of 18.  Patient was set up with apap.  Patient is using apap nightly.  Sleep is worse with apap.  Mask is not comfortable.   No dry mouth.      SLEEP ROUTINE:  Activity the hour prior to sleep: read    Bed partner:  alone  Time to bed:  11 pm   Lights off:  yes  Sleep onset latency:  20 minutes        Disruptions or awakenings:    8 times (no difficulty going back to sleep)    Wakeup time:      8:30 am   Perceived sleep quality:  tire      Daytime naps:      60 minutes (rested)  Weekend sleep routine:      11 pm to 9 am   Caffeine use: 2 mugs of coffee in am   exercise habit:   Gardening and minimal walking      PAST MEDICAL HISTORY:    Active Ambulatory Problems     Diagnosis Date Noted    Osteoporosis without pathological fracture 02/16/2016    Menopausal state 02/16/2016    CKD (chronic kidney disease), stage III 05/20/2016    Secondary hyperparathyroidism 05/20/2016    Hypertension, uncontrolled 06/07/2016    Obesity, Class II, BMI 35-39.9 12/07/2016    Vitamin D deficiency 03/23/2017    Normocytic anemia 05/04/2017    Rosacea 06/28/2017     Resolved Ambulatory Problems     Diagnosis Date Noted    Annual physical exam 02/16/2016    Sebaceous cyst 02/16/2016    Spinal axis tumor 06/10/2016    Bone lesion 06/30/2016    Cancer screening  07/05/2016    History of anemia due to chronic kidney disease 05/04/2017     Past Medical History:   Diagnosis Date    Hypertension     Kidney disease, chronic, stage III (GFR 30-59 ml/min)     Obesity     Osteoporosis                 PAST SURGICAL HISTORY:    Past Surgical History:   Procedure Laterality Date    APPENDECTOMY      BACK SURGERY  february 2016    COLONOSCOPY      ESOPHAGOGASTRODUODENOSCOPY      KNEE SURGERY Bilateral     left toe Left     TONSILLECTOMY           FAMILY HISTORY:                Family History   Problem Relation Age of Onset    Hypertension Mother     Heart disease Mother     Macular degeneration Mother     Hypertension Father     Cataracts Father     Glaucoma Father     Macular degeneration Father     Glaucoma Brother     Macular degeneration Brother     No Known Problems Sister     No Known Problems Maternal Aunt     No Known Problems Maternal Uncle     No Known Problems Paternal Aunt     No Known Problems Paternal Uncle     Macular degeneration Maternal Grandmother     Macular degeneration Maternal Grandfather     Macular degeneration Paternal Grandmother     Glaucoma Paternal Grandfather     Macular degeneration Paternal Grandfather     Amblyopia Neg Hx     Blindness Neg Hx     Cancer Neg Hx     Diabetes Neg Hx     Retinal detachment Neg Hx     Strabismus Neg Hx     Stroke Neg Hx     Thyroid disease Neg Hx        SOCIAL HISTORY:          Tobacco:   History   Smoking Status    Former Smoker   Smokeless Tobacco    Never Used       alcohol use:    History   Alcohol Use    0.0 oz/week     Comment: social                 Occupation:  Real estate management    ALLERGIES:  Review of patient's allergies indicates:  No Known Allergies    CURRENT MEDICATIONS:    Current Outpatient Prescriptions   Medication Sig Dispense Refill    calcitRIOL (ROCALTROL) 0.5 MCG Cap TAKE ONE CAPSULE BY MOUTH ONCE DAILY 90 capsule 0    CHONDROITIN SULFATE A (CHONDROITIN  "SULFATE ORAL) Take by mouth once daily at 6am.       clonazePAM (KLONOPIN) 1 MG tablet Take 1 tablet (1 mg total) by mouth nightly as needed. 30 tablet 2    diphenoxylate-atropine 2.5-0.025 mg (LOMOTIL) 2.5-0.025 mg per tablet TAKE TWO TABLETS BY MOUTH THREE TIMES DAILY AS NEEDED 180 tablet 0    ergocalciferol (ERGOCALCIFEROL) 50,000 unit Cap Take 1 capsule (50,000 Units total) by mouth once daily. 5 capsule 0    estradiol (ESTRACE) 0.5 MG tablet Take 1 tablet (0.5 mg total) by mouth once daily. 30 tablet 11    fluoxetine (PROZAC) 40 MG capsule Take 1 capsule (40 mg total) by mouth every morning. (Patient taking differently: Take 40 mg by mouth once daily. ) 90 capsule 3    irbesartan (AVAPRO) 300 MG tablet TAKE ONE TABLET BY MOUTH IN THE EVENING 30 tablet 0    medroxyPROGESTERone (PROVERA) 2.5 MG tablet Take 1 tablet (2.5 mg total) by mouth once daily. 30 tablet 11    metronidazole (METROGEL) 0.75 % gel Apply topically 2 (two) times daily. 45 g 2    PROCTO-RAZA 1 % crpe 1 applicator as needed.       PROCTO-RAZA 1 % crpe INSERT ONE APPLICATORFUL RECTALLY TWICE DAILY AS NEEDED 28.4 g 30    sodium bicarbonate 325 MG tablet Take 325 mg by mouth 4 (four) times daily.      trazodone (DESYREL) 50 MG tablet Take 1 tablet (50 mg total) by mouth every evening. 90 tablet 2    tretinoin (RETIN-A) 0.025 % cream Apply topically every evening. 45 g 0    zolpidem (AMBIEN) 5 MG Tab Take 5 mg by mouth nightly as needed.       No current facility-administered medications for this visit.                   REVIEW OF SYSTEMS:   No acute changes from previous encounter dated 5/11/2017 with exceptions mentioned in HPI.             PHYSICAL EXAM:  Vitals:    08/15/17 0941   BP: 138/80   Pulse: 75   SpO2: 97%   Weight: 114 kg (251 lb 5.2 oz)   Height: 5' 9" (1.753 m)   PainSc: 0-No pain     Body mass index is 37.11 kg/m².     GENERAL: Normal development, well groomed  HEENT:  Conjunctivae are non-erythematous; Pupils equal, " round, and reactive to light; Nose is symmetrical; Nasal mucosa is pink and moist; Septum is midline; Inferior turbinates are normal; Nasal airflow is normal; Posterior pharynx is pink; Modified Mallampati: 3; Posterior palate is normal; Tonsils +1; Uvula is normal and pink;Tongue is normal; edentulous; No TMJ tenderness; Jaw opening and protrusion without click and without discomfort.  NECK: Supple. Neck circumference is 17 inches. No thyromegaly. No palpable nodes.     SKIN: On face and neck: No abrasions, no rashes, no lesions.  No subcutaneous nodules are palpable.  RESPIRATORY: Chest is clear to auscultation.  Normal chest expansion and non-labored breathing at rest.  CARDIOVASCULAR: Normal S1, S2.  No murmurs, gallops or rubs. No carotid bruits bilaterally.  EXTREMITIES: No edema. No clubbing. No cyanosis. Station normal. Gait normal.        NEURO/PSYCH: Oriented to time, place and person. Normal attention span and concentration. Affect is full. Mood is normal.                                              DATA   5/26/17 ahi of 18; supine ahi of 78  Lab Results   Component Value Date    TSH 1.663 12/15/2016     ASSESSMENT    ICD-10-CM ICD-9-CM    1. KING (obstructive sleep apnea) G47.33 327.23 CPAP/BIPAP SUPPLIES   2. Morbid obesity due to excess calories E66.01 278.01        PLAN:    Sleep Apnea NEC. - interrogation of cpap confirmed average usage of 4.6 hours per night.  Average ahi of 4.6 when using apap.  p 90 of 9.4.  apap range narrowed to 5-10 cm H20.  Ramp option turned on.  Desensitization protocol d/w patient.  Will switch to full face to help with leakage.      Obesity - planned for bariatric surgery.  Encourage patient to use apap nightly.  If able to comply with apap during perioperative period, patient is optimized from sleep/pulmonary perspective for bariatric surgery.      Insomnia - maintenance is the issue.  May related to hyperarousal from mask leak.  Will monitor and address after apap.        Education: During our discussion today, we talked about the etiology of obstructive sleep apnea as well as the potential ramifications of untreated sleep apnea, which could include daytime sleepiness, hypertension, heart disease and/or stroke.     Precautions: The patient was advised to abstain from driving should they feel sleepy or drowsy.     Patient will Return in about 3 months (around 11/15/2017).    This is 25 minutes visit, over 50% of time spent in direct consultation with patient.

## 2017-08-16 ENCOUNTER — OFFICE VISIT (OUTPATIENT)
Dept: FAMILY MEDICINE | Facility: CLINIC | Age: 72
End: 2017-08-16
Payer: MEDICARE

## 2017-08-16 VITALS
HEART RATE: 76 BPM | SYSTOLIC BLOOD PRESSURE: 124 MMHG | WEIGHT: 249.81 LBS | BODY MASS INDEX: 37 KG/M2 | RESPIRATION RATE: 16 BRPM | OXYGEN SATURATION: 96 % | HEIGHT: 69 IN | TEMPERATURE: 98 F | DIASTOLIC BLOOD PRESSURE: 70 MMHG

## 2017-08-16 DIAGNOSIS — G47.33 OSA (OBSTRUCTIVE SLEEP APNEA): ICD-10-CM

## 2017-08-16 DIAGNOSIS — G89.29 BILATERAL CHRONIC KNEE PAIN: ICD-10-CM

## 2017-08-16 DIAGNOSIS — M25.561 BILATERAL CHRONIC KNEE PAIN: ICD-10-CM

## 2017-08-16 DIAGNOSIS — G47.9 SLEEP DISTURBANCE: Primary | ICD-10-CM

## 2017-08-16 DIAGNOSIS — M25.562 BILATERAL CHRONIC KNEE PAIN: ICD-10-CM

## 2017-08-16 PROCEDURE — 99999 PR PBB SHADOW E&M-EST. PATIENT-LVL III: CPT | Mod: PBBFAC,,, | Performed by: FAMILY MEDICINE

## 2017-08-16 PROCEDURE — 1159F MED LIST DOCD IN RCRD: CPT | Mod: ,,, | Performed by: FAMILY MEDICINE

## 2017-08-16 PROCEDURE — 1126F AMNT PAIN NOTED NONE PRSNT: CPT | Mod: ,,, | Performed by: FAMILY MEDICINE

## 2017-08-16 PROCEDURE — 3078F DIAST BP <80 MM HG: CPT | Mod: ,,, | Performed by: FAMILY MEDICINE

## 2017-08-16 PROCEDURE — 99214 OFFICE O/P EST MOD 30 MIN: CPT | Mod: S$PBB,,, | Performed by: FAMILY MEDICINE

## 2017-08-16 PROCEDURE — 3074F SYST BP LT 130 MM HG: CPT | Mod: ,,, | Performed by: FAMILY MEDICINE

## 2017-08-16 PROCEDURE — 99213 OFFICE O/P EST LOW 20 MIN: CPT | Mod: PBBFAC,PO | Performed by: FAMILY MEDICINE

## 2017-08-16 RX ORDER — TRAZODONE HYDROCHLORIDE 100 MG/1
100 TABLET ORAL NIGHTLY
Qty: 90 TABLET | Refills: 1 | Status: SHIPPED | OUTPATIENT
Start: 2017-08-16 | End: 2018-03-16

## 2017-08-16 NOTE — PROGRESS NOTES
Subjective:       Patient ID: Luisana Chun is a 71 y.o. female.    Chief Complaint: Insomnia (consult regarding Rx refill)    HPI    Insomnia - Pt has chronic insomnia that is episodic, but has been worsening in severity since she started using her cpap machine. She is requesting a refill of her ambien.     Bilateral knee osteo arthritis with chronic pain - Pt has chronic bilateral knee pain s/p knee replacements which makes it difficult for her to walk more than 200 feet without stoppign to take a break.     KING - pt is currently on CPAP machine which she finds very annoying and worsen her quality of sleep. She is adherent 4-5 hours per night. No daytime headaches.    Current Outpatient Prescriptions on File Prior to Visit   Medication Sig Dispense Refill    calcitRIOL (ROCALTROL) 0.5 MCG Cap TAKE ONE CAPSULE BY MOUTH ONCE DAILY 90 capsule 0    CHONDROITIN SULFATE A (CHONDROITIN SULFATE ORAL) Take by mouth once daily at 6am.       ergocalciferol (ERGOCALCIFEROL) 50,000 unit Cap Take 1 capsule (50,000 Units total) by mouth once daily. 5 capsule 0    estradiol (ESTRACE) 0.5 MG tablet Take 1 tablet (0.5 mg total) by mouth once daily. 30 tablet 11    fluoxetine (PROZAC) 40 MG capsule Take 1 capsule (40 mg total) by mouth every morning. (Patient taking differently: Take 40 mg by mouth once daily. ) 90 capsule 3    irbesartan (AVAPRO) 300 MG tablet TAKE ONE TABLET BY MOUTH IN THE EVENING 30 tablet 0    medroxyPROGESTERone (PROVERA) 2.5 MG tablet Take 1 tablet (2.5 mg total) by mouth once daily. 30 tablet 11    PROCTO-RAZA 1 % crpe INSERT ONE APPLICATORFUL RECTALLY TWICE DAILY AS NEEDED 28.4 g 30    sodium bicarbonate 325 MG tablet Take 325 mg by mouth 4 (four) times daily.      [DISCONTINUED] clonazePAM (KLONOPIN) 1 MG tablet Take 1 tablet (1 mg total) by mouth nightly as needed. 30 tablet 2    [DISCONTINUED] diphenoxylate-atropine 2.5-0.025 mg (LOMOTIL) 2.5-0.025 mg per tablet TAKE TWO TABLETS BY MOUTH  THREE TIMES DAILY AS NEEDED 180 tablet 0    [DISCONTINUED] metronidazole (METROGEL) 0.75 % gel Apply topically 2 (two) times daily. 45 g 2    [DISCONTINUED] PROCTO-RAZA 1 % crpe 1 applicator as needed.       [DISCONTINUED] trazodone (DESYREL) 50 MG tablet Take 1 tablet (50 mg total) by mouth every evening. 90 tablet 2    [DISCONTINUED] tretinoin (RETIN-A) 0.025 % cream Apply topically every evening. 45 g 0    [DISCONTINUED] zolpidem (AMBIEN) 5 MG Tab Take 5 mg by mouth nightly as needed.       No current facility-administered medications on file prior to visit.        Past Medical History:   Diagnosis Date    Hypertension     Kidney disease, chronic, stage III (GFR 30-59 ml/min)     Obesity     Osteoporosis        Family History   Problem Relation Age of Onset    Hypertension Mother     Heart disease Mother     Macular degeneration Mother     Hypertension Father     Cataracts Father     Glaucoma Father     Macular degeneration Father     Glaucoma Brother     Macular degeneration Brother     No Known Problems Sister     No Known Problems Maternal Aunt     No Known Problems Maternal Uncle     No Known Problems Paternal Aunt     No Known Problems Paternal Uncle     Macular degeneration Maternal Grandmother     Macular degeneration Maternal Grandfather     Macular degeneration Paternal Grandmother     Glaucoma Paternal Grandfather     Macular degeneration Paternal Grandfather     Amblyopia Neg Hx     Blindness Neg Hx     Cancer Neg Hx     Diabetes Neg Hx     Retinal detachment Neg Hx     Strabismus Neg Hx     Stroke Neg Hx     Thyroid disease Neg Hx         reports that she has quit smoking. She has never used smokeless tobacco. She reports that she drinks alcohol. She reports that she does not use drugs.    Review of Systems   Constitutional: Negative for chills and fever.   Musculoskeletal: Positive for arthralgias and gait problem.   Psychiatric/Behavioral: Positive for sleep  disturbance.       Objective:     Vitals:    08/16/17 1528   BP: 124/70   Pulse: 76   Resp: 16   Temp: 98.3 °F (36.8 °C)        Physical Exam   Constitutional: She appears well-developed. No distress.   O   HENT:   Head: Normocephalic and atraumatic.   Eyes: Conjunctivae are normal. No scleral icterus.   Pulmonary/Chest: Effort normal.   Musculoskeletal:        Right knee: She exhibits no swelling, no effusion, no ecchymosis, no deformity, no laceration and no erythema.        Left knee: She exhibits no swelling, no effusion, no ecchymosis, no deformity, no laceration and no erythema.   Neurological: She is alert.   Skin: She is not diaphoretic.   Psychiatric: She has a normal mood and affect. Her behavior is normal.   Vitals reviewed.      Assessment:       1. Sleep disturbance    2. Bilateral chronic knee pain    3. KING (obstructive sleep apnea)        Plan:       Luisana was seen today for insomnia.    Diagnoses and all orders for this visit:    Sleep disturbance  -     trazodone (DESYREL) 100 MG tablet; Take 1 tablet (100 mg total) by mouth every evening.  Reviewed CDC statement on chronic Ambien and benzo use. Trazodone, although off-label for sleep disturbance, in my medical opinion is a safer pharmacological option., and is not addictive or linked to Alzheimer's based on current evidence.      Bilateral chronic knee pain   Signed handicap sticker form today. Chronic stable - pt encouraged to loose weight which should help this pain.     KING - pt must be adherent in order to be eligible for bariatric surgery.          Return in about 3 months (around 11/16/2017) for ckd .        Pt verbalized understanding and agreed with our plan.

## 2017-08-17 RX ORDER — ZOLPIDEM TARTRATE 5 MG/1
TABLET ORAL
Qty: 30 TABLET | Refills: 0 | OUTPATIENT
Start: 2017-08-17

## 2017-08-23 ENCOUNTER — OFFICE VISIT (OUTPATIENT)
Dept: FAMILY MEDICINE | Facility: CLINIC | Age: 72
End: 2017-08-23
Payer: MEDICARE

## 2017-08-23 VITALS
SYSTOLIC BLOOD PRESSURE: 130 MMHG | DIASTOLIC BLOOD PRESSURE: 80 MMHG | WEIGHT: 246.06 LBS | TEMPERATURE: 98 F | BODY MASS INDEX: 36.44 KG/M2 | HEIGHT: 69 IN | HEART RATE: 83 BPM | RESPIRATION RATE: 16 BRPM | OXYGEN SATURATION: 97 %

## 2017-08-23 DIAGNOSIS — J06.9 ACUTE UPPER RESPIRATORY INFECTION: Primary | ICD-10-CM

## 2017-08-23 PROCEDURE — 99214 OFFICE O/P EST MOD 30 MIN: CPT | Mod: S$PBB,,, | Performed by: FAMILY MEDICINE

## 2017-08-23 PROCEDURE — 1159F MED LIST DOCD IN RCRD: CPT | Mod: ,,, | Performed by: FAMILY MEDICINE

## 2017-08-23 PROCEDURE — 1126F AMNT PAIN NOTED NONE PRSNT: CPT | Mod: ,,, | Performed by: FAMILY MEDICINE

## 2017-08-23 PROCEDURE — 99999 PR PBB SHADOW E&M-EST. PATIENT-LVL III: CPT | Mod: PBBFAC,,, | Performed by: FAMILY MEDICINE

## 2017-08-23 PROCEDURE — 3075F SYST BP GE 130 - 139MM HG: CPT | Mod: ,,, | Performed by: FAMILY MEDICINE

## 2017-08-23 PROCEDURE — 3079F DIAST BP 80-89 MM HG: CPT | Mod: ,,, | Performed by: FAMILY MEDICINE

## 2017-08-23 PROCEDURE — 99213 OFFICE O/P EST LOW 20 MIN: CPT | Mod: PBBFAC,PO | Performed by: FAMILY MEDICINE

## 2017-08-23 PROCEDURE — 1157F ADVNC CARE PLAN IN RCRD: CPT | Mod: ,,, | Performed by: FAMILY MEDICINE

## 2017-08-23 RX ORDER — CODEINE PHOSPHATE AND GUAIFENESIN 10; 100 MG/5ML; MG/5ML
5 SOLUTION ORAL 3 TIMES DAILY PRN
Qty: 236 ML | Refills: 0 | Status: SHIPPED | OUTPATIENT
Start: 2017-08-23 | End: 2017-09-02

## 2017-08-23 NOTE — PROGRESS NOTES
Subjective:       Patient ID: Luisana Chun is a 71 y.o. female.    Chief Complaint: Sinus Problem (chest congestion, cough, dizziness, and chest inflammation x1 week)    Sinus Problem   This is a new problem. The current episode started in the past 7 days. The problem has been gradually improving since onset. There has been no fever. Associated symptoms include congestion, coughing, diaphoresis and sinus pressure. Pertinent negatives include no shortness of breath or sore throat. (Dizziness) Treatments tried: aspirin, zycam. The treatment provided mild relief.         Current Outpatient Prescriptions on File Prior to Visit   Medication Sig Dispense Refill    calcitRIOL (ROCALTROL) 0.5 MCG Cap TAKE ONE CAPSULE BY MOUTH ONCE DAILY 90 capsule 0    CHONDROITIN SULFATE A (CHONDROITIN SULFATE ORAL) Take by mouth once daily at 6am.       ergocalciferol (ERGOCALCIFEROL) 50,000 unit Cap Take 1 capsule (50,000 Units total) by mouth once daily. 5 capsule 0    estradiol (ESTRACE) 0.5 MG tablet Take 1 tablet (0.5 mg total) by mouth once daily. 30 tablet 11    fluoxetine (PROZAC) 40 MG capsule Take 1 capsule (40 mg total) by mouth every morning. (Patient taking differently: Take 40 mg by mouth once daily. ) 90 capsule 3    irbesartan (AVAPRO) 300 MG tablet TAKE ONE TABLET BY MOUTH IN THE EVENING 30 tablet 0    medroxyPROGESTERone (PROVERA) 2.5 MG tablet Take 1 tablet (2.5 mg total) by mouth once daily. 30 tablet 11    PROCTO-RAZA 1 % crpe INSERT ONE APPLICATORFUL RECTALLY TWICE DAILY AS NEEDED 28.4 g 30    sodium bicarbonate 325 MG tablet Take 325 mg by mouth 4 (four) times daily.      trazodone (DESYREL) 100 MG tablet Take 1 tablet (100 mg total) by mouth every evening. 90 tablet 1     No current facility-administered medications on file prior to visit.        Past Medical History:   Diagnosis Date    Hypertension     Kidney disease, chronic, stage III (GFR 30-59 ml/min)     Obesity     Osteoporosis         Family History   Problem Relation Age of Onset    Hypertension Mother     Heart disease Mother     Macular degeneration Mother     Hypertension Father     Cataracts Father     Glaucoma Father     Macular degeneration Father     Glaucoma Brother     Macular degeneration Brother     No Known Problems Sister     No Known Problems Maternal Aunt     No Known Problems Maternal Uncle     No Known Problems Paternal Aunt     No Known Problems Paternal Uncle     Macular degeneration Maternal Grandmother     Macular degeneration Maternal Grandfather     Macular degeneration Paternal Grandmother     Glaucoma Paternal Grandfather     Macular degeneration Paternal Grandfather     Amblyopia Neg Hx     Blindness Neg Hx     Cancer Neg Hx     Diabetes Neg Hx     Retinal detachment Neg Hx     Strabismus Neg Hx     Stroke Neg Hx     Thyroid disease Neg Hx         reports that she has quit smoking. She has never used smokeless tobacco. She reports that she drinks alcohol. She reports that she does not use drugs.    Review of Systems   Constitutional: Positive for diaphoresis.   HENT: Positive for congestion and sinus pressure. Negative for sore throat.    Respiratory: Positive for cough. Negative for shortness of breath.        Objective:     Vitals:    08/23/17 1528   BP: 130/80   Pulse: 83   Resp: 16   Temp: 98.3 °F (36.8 °C)        Physical Exam   Constitutional: She is oriented to person, place, and time. She appears well-developed and well-nourished. No distress.   HENT:   Head: Normocephalic and atraumatic.   Nose: No mucosal edema.   Mouth/Throat: Posterior oropharyngeal erythema present. No oropharyngeal exudate, posterior oropharyngeal edema or tonsillar abscesses.   PND    No sinus ttp   Eyes: Conjunctivae and EOM are normal. Right eye exhibits no discharge. Left eye exhibits no discharge. No scleral icterus.   Neck: No tracheal deviation present. No thyromegaly present.   Cardiovascular:  "Normal rate and regular rhythm.  Exam reveals no gallop and no friction rub.    No murmur heard.  Pulmonary/Chest: Effort normal. No respiratory distress. She has wheezes (R sided exp wheezing at the base only. ). She has no rales.   Lymphadenopathy:     She has no cervical adenopathy.   Neurological: She is oriented to person, place, and time.   Skin: She is not diaphoretic.   Psychiatric: She has a normal mood and affect.   Vitals reviewed.      Assessment:       1. Acute upper respiratory infection        Plan:       Luisana was seen today for sinus problem.    Diagnoses and all orders for this visit:    Acute upper respiratory infection  -     guaifenesin-codeine 100-10 mg/5 ml (TUSSI-ORGANIDIN NR)  mg/5 mL syrup; Take 5 mLs by mouth 3 (three) times daily as needed for Cough.    Increase fluids and rest.  You body needs increased water but other beverages may aid in comfort.  You will know that you have had enough water to be hydrated when your urine is clear or at least a very pale yellow.  You may use Mucinex to help thin thick secretions to allow you to expel them but it only works if you drink more water. Nasal saline may help with removal of mucus as well.  Ibuprofen is preferred for aches and pains as well as fever reduction.  Zyrtec or Claritin or Allegra may help with some of the runny nose symptoms if you are having them.  If you do not have high blood pressure, then you may use a decongestant such as pseudoephedrine or one of the above medications that have the letter, "-D" following it.  Hot tea with honey can help with sore throats as the heat with reduce the inflammation and the honey will coat your throat to help it feel better. Prescription cough medicine should be used at night to aid in sleep.  Coughing during the day is the body's way of removing the infectious agent; however, the prescription cough medicine may also be used for coughing fits during the day. Lastly, good hand washing and " cough hygiene (cough into your elbow) will help prevent the spread of the illness.  A general rule is that you are no longer contagious once you have been without a fever for over 24 hours without requiring fever reducing medications.      Since pt is getting better x the last 2 days, will no pursue aggressive investigation with chest xray. Will likely treat for atypical pna if pts sxs worsen over the next few days. This could be a viral pna.        Return if symptoms worsen or fail to improve opr as previously scheduled.      Pt verbalized understanding and agreed with our plan.

## 2017-09-06 ENCOUNTER — LAB VISIT (OUTPATIENT)
Dept: LAB | Facility: HOSPITAL | Age: 72
End: 2017-09-06
Attending: INTERNAL MEDICINE
Payer: MEDICARE

## 2017-09-06 DIAGNOSIS — N18.30 CKD (CHRONIC KIDNEY DISEASE), STAGE III: ICD-10-CM

## 2017-09-06 LAB
BILIRUB UR QL STRIP: NEGATIVE
CLARITY UR REFRACT.AUTO: ABNORMAL
COLOR UR AUTO: YELLOW
CREAT UR-MCNC: 83 MG/DL
GLUCOSE UR QL STRIP: NEGATIVE
HGB UR QL STRIP: NEGATIVE
KETONES UR QL STRIP: NEGATIVE
LEUKOCYTE ESTERASE UR QL STRIP: NEGATIVE
NITRITE UR QL STRIP: NEGATIVE
PH UR STRIP: 5 [PH] (ref 5–8)
PROT UR QL STRIP: NEGATIVE
PROT UR-MCNC: 12 MG/DL
PROT/CREAT RATIO, UR: 0.14
SP GR UR STRIP: 1.01 (ref 1–1.03)
URN SPEC COLLECT METH UR: ABNORMAL
UROBILINOGEN UR STRIP-ACNC: NEGATIVE EU/DL

## 2017-09-06 PROCEDURE — 81003 URINALYSIS AUTO W/O SCOPE: CPT

## 2017-09-06 PROCEDURE — 84156 ASSAY OF PROTEIN URINE: CPT

## 2017-09-14 ENCOUNTER — TELEPHONE (OUTPATIENT)
Dept: SLEEP MEDICINE | Facility: CLINIC | Age: 72
End: 2017-09-14

## 2017-09-14 NOTE — TELEPHONE ENCOUNTER
----- Message from Angelito Mendes sent at 9/13/2017  3:55 PM CDT -----  Contact: Kathie eliazbeth)  X_ 1st Request  _ 2nd Request  _ 3rd Request    Who:Kathie elizabeth)    Why: Needs compliance chart notes fax to [phone]    What Number to Call Back: [phone]    When to Expect a call back: (Before the end of the day)  -- if call after 3:00 call back will be tomorrow.

## 2017-09-15 ENCOUNTER — OFFICE VISIT (OUTPATIENT)
Dept: FAMILY MEDICINE | Facility: CLINIC | Age: 72
End: 2017-09-15
Payer: MEDICARE

## 2017-09-15 VITALS
TEMPERATURE: 99 F | WEIGHT: 253.31 LBS | RESPIRATION RATE: 20 BRPM | SYSTOLIC BLOOD PRESSURE: 138 MMHG | HEIGHT: 69 IN | DIASTOLIC BLOOD PRESSURE: 68 MMHG | BODY MASS INDEX: 37.52 KG/M2 | OXYGEN SATURATION: 96 % | HEART RATE: 72 BPM

## 2017-09-15 DIAGNOSIS — G47.33 OSA (OBSTRUCTIVE SLEEP APNEA): ICD-10-CM

## 2017-09-15 DIAGNOSIS — H65.92 MEE (MIDDLE EAR EFFUSION), LEFT: ICD-10-CM

## 2017-09-15 DIAGNOSIS — R21 RASH: Primary | ICD-10-CM

## 2017-09-15 DIAGNOSIS — I10 HYPERTENSION, UNCONTROLLED: ICD-10-CM

## 2017-09-15 PROCEDURE — 99213 OFFICE O/P EST LOW 20 MIN: CPT | Mod: PBBFAC,PO | Performed by: FAMILY MEDICINE

## 2017-09-15 PROCEDURE — 3078F DIAST BP <80 MM HG: CPT | Mod: ,,, | Performed by: FAMILY MEDICINE

## 2017-09-15 PROCEDURE — 1125F AMNT PAIN NOTED PAIN PRSNT: CPT | Mod: ,,, | Performed by: FAMILY MEDICINE

## 2017-09-15 PROCEDURE — 3075F SYST BP GE 130 - 139MM HG: CPT | Mod: ,,, | Performed by: FAMILY MEDICINE

## 2017-09-15 PROCEDURE — 1157F ADVNC CARE PLAN IN RCRD: CPT | Mod: ,,, | Performed by: FAMILY MEDICINE

## 2017-09-15 PROCEDURE — 99214 OFFICE O/P EST MOD 30 MIN: CPT | Mod: S$PBB,,, | Performed by: FAMILY MEDICINE

## 2017-09-15 PROCEDURE — 1159F MED LIST DOCD IN RCRD: CPT | Mod: ,,, | Performed by: FAMILY MEDICINE

## 2017-09-15 PROCEDURE — 99999 PR PBB SHADOW E&M-EST. PATIENT-LVL III: CPT | Mod: PBBFAC,,, | Performed by: FAMILY MEDICINE

## 2017-09-15 RX ORDER — TRIAMCINOLONE ACETONIDE 1 MG/G
CREAM TOPICAL 2 TIMES DAILY
Qty: 45 G | Refills: 1 | Status: SHIPPED | OUTPATIENT
Start: 2017-09-15 | End: 2018-01-23

## 2017-09-15 NOTE — PROGRESS NOTES
"Subjective:       Patient ID: Luisana Chun is a 71 y.o. female.    Chief Complaint: Rash (irritation and pain on buttocks for months)    HPI    Htn - pt is doing well on irbesartan, but she has missed a few days. Bp has looked well controlled.     Thyroid nodules - pt had a recent thyroid nodules biopsies, waiting on results.     KING - pt is now on a CPAP. She notes no improvement of any sxs but does have issues.  She has a lot of sleep interruptions. She has increased cough since she started using a cpap machine.     L ear "fluid in her ear" - onset a few days ago that can be heard when she shifts her head.       Current Outpatient Prescriptions on File Prior to Visit   Medication Sig Dispense Refill    calcitRIOL (ROCALTROL) 0.5 MCG Cap TAKE ONE CAPSULE BY MOUTH ONCE DAILY 90 capsule 0    CHONDROITIN SULFATE A (CHONDROITIN SULFATE ORAL) Take by mouth once daily at 6am.       ergocalciferol (ERGOCALCIFEROL) 50,000 unit Cap Take 1 capsule (50,000 Units total) by mouth once daily. 5 capsule 0    estradiol (ESTRACE) 0.5 MG tablet Take 1 tablet (0.5 mg total) by mouth once daily. 30 tablet 11    fluoxetine (PROZAC) 40 MG capsule Take 1 capsule (40 mg total) by mouth every morning. (Patient taking differently: Take 40 mg by mouth once daily. ) 90 capsule 3    irbesartan (AVAPRO) 300 MG tablet TAKE ONE TABLET BY MOUTH IN THE EVENING 30 tablet 0    medroxyPROGESTERone (PROVERA) 2.5 MG tablet Take 1 tablet (2.5 mg total) by mouth once daily. 30 tablet 11    PROCTO-RAZA 1 % crpe INSERT ONE APPLICATORFUL RECTALLY TWICE DAILY AS NEEDED 28.4 g 30    sodium bicarbonate 325 MG tablet Take 325 mg by mouth 4 (four) times daily.      trazodone (DESYREL) 100 MG tablet Take 1 tablet (100 mg total) by mouth every evening. 90 tablet 1     No current facility-administered medications on file prior to visit.        Past Medical History:   Diagnosis Date    Hypertension     Kidney disease, chronic, stage III (GFR 30-59 " ml/min)     Obesity     Osteoporosis        Family History   Problem Relation Age of Onset    Hypertension Mother     Heart disease Mother     Macular degeneration Mother     Hypertension Father     Cataracts Father     Glaucoma Father     Macular degeneration Father     Glaucoma Brother     Macular degeneration Brother     No Known Problems Sister     No Known Problems Maternal Aunt     No Known Problems Maternal Uncle     No Known Problems Paternal Aunt     No Known Problems Paternal Uncle     Macular degeneration Maternal Grandmother     Macular degeneration Maternal Grandfather     Macular degeneration Paternal Grandmother     Glaucoma Paternal Grandfather     Macular degeneration Paternal Grandfather     Amblyopia Neg Hx     Blindness Neg Hx     Cancer Neg Hx     Diabetes Neg Hx     Retinal detachment Neg Hx     Strabismus Neg Hx     Stroke Neg Hx     Thyroid disease Neg Hx         reports that she has quit smoking. She has never used smokeless tobacco. She reports that she drinks alcohol. She reports that she does not use drugs.    Review of Systems   Constitutional: Negative for chills and fever.   Skin: Positive for rash and wound.       Objective:     Vitals:    09/15/17 1415   BP: 138/68   Pulse: 72   Resp: 20   Temp: 98.7 °F (37.1 °C)        Physical Exam   HENT:   Right Ear: No tenderness. No middle ear effusion.   Left Ear: No tenderness. A middle ear effusion is present.   Skin:            Assessment:       1. Rash    2. Hypertension, uncontrolled    3. KING (obstructive sleep apnea)    4. MILENA (middle ear effusion), left        Plan:       Luisana was seen today for rash.    Diagnoses and all orders for this visit:    Rash  -     triamcinolone acetonide 0.1% (KENALOG) 0.1 % cream; Apply topically 2 (two) times daily.  Appears to be a resolving abscess. Will treat as above. Pt to notify me if lesions worsens or pain intensifies as it is painless unless she is sitting directly  on it currently.     Hypertension, uncontrolled  BP looks good today. Pt to continue irbesartan.     KING (obstructive sleep apnea)  Pt is adherent with CPAP every night.     MILENA (middle ear effusion), left  Pts sxs and PE most coincide with AR. Will give pt a trial of cetirizine for relief of pts sxs. Pt asked to allow 2 weeks of consistent use before evaluating the efficacy of cetirizine. Pt to call back if sxs worsen or do not improve in 2 weeks.     Also may be related to CPAP use?          Return in about 3 months (around 12/15/2017) for Hypertension.        Pt verbalized understanding and agreed with our plan.

## 2017-09-15 NOTE — PROGRESS NOTES
Zoster Vaccine ;h/x of chickenpox . Had vaccine at [facility] in [place] on [address]

## 2017-09-21 ENCOUNTER — OFFICE VISIT (OUTPATIENT)
Dept: NEPHROLOGY | Facility: CLINIC | Age: 72
End: 2017-09-21
Payer: MEDICARE

## 2017-09-21 ENCOUNTER — LAB VISIT (OUTPATIENT)
Dept: LAB | Facility: HOSPITAL | Age: 72
End: 2017-09-21
Attending: INTERNAL MEDICINE
Payer: MEDICARE

## 2017-09-21 VITALS
OXYGEN SATURATION: 98 % | SYSTOLIC BLOOD PRESSURE: 142 MMHG | DIASTOLIC BLOOD PRESSURE: 70 MMHG | BODY MASS INDEX: 37.71 KG/M2 | HEIGHT: 69 IN | HEART RATE: 76 BPM | WEIGHT: 254.63 LBS

## 2017-09-21 DIAGNOSIS — E55.9 VITAMIN D DEFICIENCY: ICD-10-CM

## 2017-09-21 DIAGNOSIS — N18.4 CKD (CHRONIC KIDNEY DISEASE), STAGE IV: Primary | ICD-10-CM

## 2017-09-21 DIAGNOSIS — N18.4 CKD (CHRONIC KIDNEY DISEASE), STAGE IV: ICD-10-CM

## 2017-09-21 DIAGNOSIS — I12.9 HYPERTENSIVE CKD (CHRONIC KIDNEY DISEASE): ICD-10-CM

## 2017-09-21 DIAGNOSIS — E87.20 METABOLIC ACIDOSIS: ICD-10-CM

## 2017-09-21 DIAGNOSIS — N25.81 SECONDARY HYPERPARATHYROIDISM: ICD-10-CM

## 2017-09-21 LAB
ALBUMIN SERPL BCP-MCNC: 3.6 G/DL
ANION GAP SERPL CALC-SCNC: 7 MMOL/L
BUN SERPL-MCNC: 24 MG/DL
CALCIUM SERPL-MCNC: 8.8 MG/DL
CHLORIDE SERPL-SCNC: 113 MMOL/L
CO2 SERPL-SCNC: 18 MMOL/L
CREAT SERPL-MCNC: 1.7 MG/DL
EST. GFR  (AFRICAN AMERICAN): 34.2 ML/MIN/1.73 M^2
EST. GFR  (NON AFRICAN AMERICAN): 29.7 ML/MIN/1.73 M^2
GLUCOSE SERPL-MCNC: 96 MG/DL
PHOSPHATE SERPL-MCNC: 3.4 MG/DL
POTASSIUM SERPL-SCNC: 4.4 MMOL/L
PTH-INTACT SERPL-MCNC: 103 PG/ML
SODIUM SERPL-SCNC: 138 MMOL/L

## 2017-09-21 PROCEDURE — 36415 COLL VENOUS BLD VENIPUNCTURE: CPT

## 2017-09-21 PROCEDURE — 83970 ASSAY OF PARATHORMONE: CPT

## 2017-09-21 PROCEDURE — 1157F ADVNC CARE PLAN IN RCRD: CPT | Mod: ,,, | Performed by: INTERNAL MEDICINE

## 2017-09-21 PROCEDURE — 80069 RENAL FUNCTION PANEL: CPT

## 2017-09-21 PROCEDURE — 99215 OFFICE O/P EST HI 40 MIN: CPT | Mod: S$PBB,,, | Performed by: INTERNAL MEDICINE

## 2017-09-21 PROCEDURE — 99999 PR PBB SHADOW E&M-EST. PATIENT-LVL III: CPT | Mod: PBBFAC,,, | Performed by: INTERNAL MEDICINE

## 2017-09-21 PROCEDURE — 1159F MED LIST DOCD IN RCRD: CPT | Mod: ,,, | Performed by: INTERNAL MEDICINE

## 2017-09-21 PROCEDURE — 99213 OFFICE O/P EST LOW 20 MIN: CPT | Mod: PBBFAC | Performed by: INTERNAL MEDICINE

## 2017-09-21 PROCEDURE — 3077F SYST BP >= 140 MM HG: CPT | Mod: ,,, | Performed by: INTERNAL MEDICINE

## 2017-09-21 PROCEDURE — 3078F DIAST BP <80 MM HG: CPT | Mod: ,,, | Performed by: INTERNAL MEDICINE

## 2017-09-21 NOTE — PROGRESS NOTES
Subjective:       Patient ID: Luisana Chun is a 72 y.o. White female who presents for follow up on  Chronic Kidney Disease    HPI     Ms. Chun is seen in nephrology clinic for follow up on CKD. She was last followed on 5/11/17, she established care with our clinic on 2/25/16.     Today she arrived in the clinic accompanied by her girlfriend who she introduced as her caretaker. Pt has last set of renal labs from 6/17 which showed worsening of renal function.    Interim, in 5/17 she had labs. She had elevated creatinine per labs from 5/26/17, she had E coli UTI that time and was treated with bactrim per her PCP. Elevated creatinine from labs from 5/26/17 and corresponding urine study showed overwhelming UTI, CRISTEL on CKD III most likely due to E. Coli UTI and bactrim use. CRISTEL was suspected from pyelonephritis, bactrim use.     Her last set of lab from 6/30/17 as follows Na 138, K 4.1, bicarbonate 16, BUN 26, creatinine 1.9, eGFR 26.1, Ca 9, albumin 3.6, phos 2.4.    Urinalysis from 9/6/17 noted to be unremarkable and she does not have any proteinuria.     She states she has not been making good amount of urine. She has been feeling tired. She denies dysuria/ vomiting. Diarrhea/ flank pain/ fever. She states she Is going to have bariatric surgery evaluation as she is wanting to lose weight. Her current BMI is 37. She states she had a recent thyroid nodule needle biopsy done at [facility], she is waiting on the results.    Originally she had stated she was visiting from [place] and had plans to go back there but apparently that has not been the case. She reports she has a diagnosis of CKD and had established nephrology follow up in [place]. She reports she was told of CKD due to her chronic diarrhea?. Pt has hypertension for several years, with onset in her 20's. She denies any h/o diabetes. Pt reports h/o UTI. Prior records from [place] have not been received. She reports she has chronic diarrhea for several years but  states that she had no work up done for this. She reports having leg swelling but she never had any abnormal proteinuria so etiology of leg swelling not clear.     She was enrolled in NICE study at [facility] and she had labs done from [facility] from 11/22/16. Per those labs her creatinine was 1.6 which is close to her baseline. Now she reports she has been out of that study. I have not received any communication from [facility] as to the findings of her participation into the study.     In the past She had labs taken on 5/1/17 for pre clinic purpose, it showed CRISTEL on her CKDIII. On further questioning it appears that she had restarted taking HCTZ in around 3/17. She claims she was drinking enough fluids but labs showed pre renal azotemia. Per her PCP who followed her since those labs, she was taken off losartan and HCTZ. Since taking her off ARB and HCTZ, she had labs taken by her PCP again which showed improvement in her creatinine from 1.9 to 1.4 and BUN from 47 to 27.    Prior labs showed hep C and B screen is negative, and has normal protein electrophoresis pattern. On US she has 10.5 cm kidney size. There is decreased corticomedullary differentiation and decreased cortical thickness. There is no mass/ stone/ obstruction.     Review of Systems   Constitutional: Positive for fatigue. Negative for activity change, appetite change, chills and fever.   HENT: Negative for sneezing and sore throat.    Eyes: Negative for discharge and redness.   Respiratory: Negative for cough, shortness of breath and wheezing.    Cardiovascular: Negative for chest pain and leg swelling.   Gastrointestinal: Negative for abdominal distention, abdominal pain, blood in stool, diarrhea, nausea and vomiting.   Endocrine: Negative for polydipsia and polyuria.   Genitourinary: Negative for difficulty urinating, dysuria, flank pain, frequency and hematuria.   Musculoskeletal: Negative for gait problem and joint swelling.   Skin: Negative for pallor and  rash.   Neurological: Negative for dizziness, light-headedness and headaches.   Psychiatric/Behavioral: Negative for behavioral problems. The patient is not nervous/anxious.        Objective:      Physical Exam   Constitutional: She is oriented to person, place, and time. She appears well-developed. No distress.   Neck: Neck supple.   Cardiovascular: Normal rate, regular rhythm and normal heart sounds.    No murmur heard.  Pulmonary/Chest: Effort normal and breath sounds normal. No respiratory distress. She has no wheezes. She has no rales.   Abdominal: Soft. There is no tenderness.   Musculoskeletal: She exhibits no edema.   Neurological: She is alert and oriented to person, place, and time.   Skin: Skin is warm and dry.   Psychiatric: She has a normal mood and affect. Her behavior is normal.   Nursing note and vitals reviewed.      Assessment:       1. CKD (chronic kidney disease), stage IV    2. Secondary hyperparathyroidism    3. Metabolic acidosis    4. Vitamin D deficiency    5. Hypertensive CKD (chronic kidney disease)        Plan:     Ms. Chun has longstanding hypertension, chronic diarrhea, NSAID use, she is here for nephrology visit for CKD. She is from [place] where she had an established renal follow up. She had labs from PCP office from 12/15 which show creatinine of around 1.3, eGFR of 40, K 5, bicarbonate 17 to 20. Her labs from [place] are not available for review but pt reports her creatinine and GFR are somewhat similar to these results. Her CKD III which has now progressed to IV is likely due to longstanding hypertension and NSAID use. We discussed about this in detail. CKD staging, potential risk of decline in renal function was discussed with her.     She has had multiple episodes of CRISTEL on CKD in different context including pyelonephritis/ UTI, over diuresis/ continued ARB use while volume depleted etc.     Defer further antihypertensive management to her PCP. For long term she would benefit by using  RAAS blockade to slow down progression of her CKD as long as she does not develop CRISTEL and hyperkalemia. This was explained to her in detail. Also explained that she has US from 6/16 which shows changes of CKD. She has cortical thinning on that US. She already had CKD known to her for the past few years. Her serologic work up has been negative. As such I do not see a role in offering her invasive work up like percutaneous kidney biopsy. Especially her most recent episode of CRISTEL was likely due to volume depletion from diuretic causing pre renal azotemia.     Plan:  - avoid NSAID use ever, discussed with her, printed NSAID medication list was already given to her, have stressed to avoid it due to risk of progression of CKD  - have advised to stop multivitamin tablet as it can have K, Ca, vit D and runs risk of electrolyte disturbance as a result of such preparations   - continue calcitriol and continue to trend PTH and corrected Ca  - strict low salt diet and low K diet  - advise periodic monitoring of renal labs for electrolytes, acid base status, to rule out progression of CKD  - goal BP less than 130-140/80, advise home BP monitoring  - continue to follow with PCP for anemia management, chronic fatigue, leg edema. This was again stressed to her.   - explained to her that at present she does not need protein or phos restriction but does need low salt and low K diet  - continue sodium bicarbonate for correction of metabolic acidosis  - CKD staging and potential risk for progression of CKD d/w her and her girlfriend     Labs now to see if any further worsening of renal function. Stop ARB if eGFR is less than 20. Titrate the dose of sodium bicarbonate per level of metabolic acidosis. Improve fluid intake.     RTC 2 months with pre clinic labs  Plan d/w pt and her girlfriend in detail including diet, labs, follow up.    Addendum  Labs followed after clinic visit, Na 138, K 4.4, bicarbonate 18, BUN 24, creatinine 1.7,  eGFR 29.7, Ca 8.8, phos 3.4, albumin 3.6, , urine analysis abnormal, nitrite positive, urine PC ratio 0.23. Urine culture has been ordered. Closely monitor renal labs every 4 weeks.

## 2017-09-22 ENCOUNTER — TELEPHONE (OUTPATIENT)
Dept: NEPHROLOGY | Facility: CLINIC | Age: 72
End: 2017-09-22

## 2017-09-22 DIAGNOSIS — R82.90 ABNORMAL URINALYSIS: Primary | ICD-10-CM

## 2017-09-22 DIAGNOSIS — N18.4 CKD (CHRONIC KIDNEY DISEASE), STAGE IV: Primary | ICD-10-CM

## 2017-09-27 ENCOUNTER — TELEPHONE (OUTPATIENT)
Dept: BARIATRICS | Facility: CLINIC | Age: 72
End: 2017-09-27

## 2017-09-27 NOTE — TELEPHONE ENCOUNTER
----- Message from Mila Del Cid PA-C sent at 9/27/2017  3:21 PM CDT -----  Regarding: RE: Please review her RD notes as she wants to come here instead of [facility]  She is 72 years old.  Benefit might not outweigh risk.  Chart review does not reveal a stellar candidate.  As indicated, everything has to be done over.      Mila  ----- Message -----  From: Harrison Chi RD  Sent: 9/27/2017   3:03 PM  To: Yaya Winter RN, #  Subject: RE: Please review her RD notes as she wants #    I've already discussed this with Mrs. Hall, but this is going to be a no go. Reviewing her dietitian notes, she has not been following the diet appropriately. Unsure of risk/benefit.     ----- Message -----  From: Yaya Winter RN  Sent: 9/27/2017   2:38 PM  To: [facility]  Subject: Please review her RD notes as she wants to c#

## 2017-09-27 NOTE — TELEPHONE ENCOUNTER
----- Message from Regino Ramirez sent at 9/27/2017 11:35 AM CDT -----  Patient states that (s)he needs to speak with nurse in ref to scheduling an appt;pt states that she has done the online seminar and started the bariatric process on the [facility]; I do see that she started the process but I was unsure of what the process is to change MD's//please call pt back at [phone] to get this taken care of//thank you

## 2017-09-27 NOTE — TELEPHONE ENCOUNTER
Presently with Dr. Goff and not happy with him and her doctor suggest that she contact Dr. Murray.  Failed the psych eval at  and states that they doctor accused her of eating a ice cream in the visit when it was her friend who was eating it. I told her that we'll need to see her as a new consult and she'd have to do another psych eval with testing at our psychologists office. I told her she's going to have to see our RD and they will decide if she's going to need further visits.  She understands.  She lives in [place] and she states she has a home here as well and she was advised of the f/u visits required. Consult appt set up in Nov, she needs to do seminar as we do not accept Cook[employer] seminar and if she cannot do this at home she'll have to do this here. She states she's not very good with computers. Mailed appt slip to patient.  Asked RD's to look at chart.

## 2017-09-29 ENCOUNTER — DOCUMENTATION ONLY (OUTPATIENT)
Dept: BARIATRICS | Facility: CLINIC | Age: 72
End: 2017-09-29

## 2017-09-29 ENCOUNTER — OFFICE VISIT (OUTPATIENT)
Dept: FAMILY MEDICINE | Facility: CLINIC | Age: 72
End: 2017-09-29
Payer: MEDICARE

## 2017-09-29 ENCOUNTER — LAB VISIT (OUTPATIENT)
Dept: LAB | Facility: HOSPITAL | Age: 72
End: 2017-09-29
Attending: INTERNAL MEDICINE
Payer: MEDICARE

## 2017-09-29 VITALS
WEIGHT: 259.06 LBS | RESPIRATION RATE: 16 BRPM | HEIGHT: 69 IN | BODY MASS INDEX: 38.37 KG/M2 | SYSTOLIC BLOOD PRESSURE: 130 MMHG | OXYGEN SATURATION: 97 % | HEART RATE: 84 BPM | DIASTOLIC BLOOD PRESSURE: 66 MMHG | TEMPERATURE: 98 F

## 2017-09-29 DIAGNOSIS — E04.1 THYROID NODULE: ICD-10-CM

## 2017-09-29 DIAGNOSIS — E21.0 PRIMARY HYPERPARATHYROIDISM: Primary | ICD-10-CM

## 2017-09-29 DIAGNOSIS — N18.4 CKD (CHRONIC KIDNEY DISEASE), STAGE IV: ICD-10-CM

## 2017-09-29 DIAGNOSIS — R60.0 PEDAL EDEMA: ICD-10-CM

## 2017-09-29 LAB
BASOPHILS # BLD AUTO: 0.02 K/UL
BASOPHILS NFR BLD: 0.2 %
DIFFERENTIAL METHOD: ABNORMAL
EOSINOPHIL # BLD AUTO: 0.2 K/UL
EOSINOPHIL NFR BLD: 2.7 %
ERYTHROCYTE [DISTWIDTH] IN BLOOD BY AUTOMATED COUNT: 14.6 %
HCT VFR BLD AUTO: 32.9 %
HGB BLD-MCNC: 10.3 G/DL
LYMPHOCYTES # BLD AUTO: 2 K/UL
LYMPHOCYTES NFR BLD: 24.1 %
MCH RBC QN AUTO: 30.4 PG
MCHC RBC AUTO-ENTMCNC: 31.3 G/DL
MCV RBC AUTO: 97 FL
MONOCYTES # BLD AUTO: 0.8 K/UL
MONOCYTES NFR BLD: 9.1 %
NEUTROPHILS # BLD AUTO: 5.4 K/UL
NEUTROPHILS NFR BLD: 63.7 %
PLATELET # BLD AUTO: 251 K/UL
PMV BLD AUTO: 10.4 FL
RBC # BLD AUTO: 3.39 M/UL
WBC # BLD AUTO: 8.43 K/UL

## 2017-09-29 PROCEDURE — 1159F MED LIST DOCD IN RCRD: CPT | Mod: ,,, | Performed by: FAMILY MEDICINE

## 2017-09-29 PROCEDURE — 99999 PR PBB SHADOW E&M-EST. PATIENT-LVL III: CPT | Mod: PBBFAC,,, | Performed by: FAMILY MEDICINE

## 2017-09-29 PROCEDURE — 1126F AMNT PAIN NOTED NONE PRSNT: CPT | Mod: ,,, | Performed by: FAMILY MEDICINE

## 2017-09-29 PROCEDURE — 85025 COMPLETE CBC W/AUTO DIFF WBC: CPT

## 2017-09-29 PROCEDURE — 3078F DIAST BP <80 MM HG: CPT | Mod: ,,, | Performed by: FAMILY MEDICINE

## 2017-09-29 PROCEDURE — 3075F SYST BP GE 130 - 139MM HG: CPT | Mod: ,,, | Performed by: FAMILY MEDICINE

## 2017-09-29 PROCEDURE — 1157F ADVNC CARE PLAN IN RCRD: CPT | Mod: ,,, | Performed by: FAMILY MEDICINE

## 2017-09-29 PROCEDURE — 99213 OFFICE O/P EST LOW 20 MIN: CPT | Mod: PBBFAC,PO | Performed by: FAMILY MEDICINE

## 2017-09-29 PROCEDURE — 99214 OFFICE O/P EST MOD 30 MIN: CPT | Mod: S$PBB,,, | Performed by: FAMILY MEDICINE

## 2017-09-29 PROCEDURE — 36415 COLL VENOUS BLD VENIPUNCTURE: CPT | Mod: PO

## 2017-09-29 NOTE — PROGRESS NOTES
Bariatric Surgery Online Course Form Submission  Someone has submitted a Bariatric Surgery Online Course Form Submission on this page.  Date: 2017-09-29 - 13:31  Patient's Name: puja bird  YOB: 1945 Email: [email or web address]  Phone: [phone]   Patient Address: [address] [place] [address]  Preferred Surgical Location: [facility]   I certify that I am 18 years of age or older:   Confirmation Code: [id number]  Verification of Bariatric Seminar: y  Insurance Information  Insurance or Self Pay? Insurance - Fill out fields below  Insurance Company Name: medicare   Type of Coverage/Coverage Plan (i.e. PPO, HMO):   Group Name:   Subscriber Name: puja bird   Member Name (patient's name)   Member ID/Policy #:[id number]   Plan Effective Date: 2007  Card Issuance date: none

## 2017-09-29 NOTE — PROGRESS NOTES
Zoster Vaccine Done - unable to find right pharmacy pt had vaccine done at. Pt will call insurance company to get information     Influenza Vaccine Done - unable to find right pharmacy pt had vaccine done at. Pt will call insurance company to get information

## 2017-09-29 NOTE — PROGRESS NOTES
Subjective:       Patient ID: Luisana Chun is a 72 y.o. female.    Chief Complaint: Hypertension (follow up ) and Swelling (right ankle and hand - off/on)    HPI    Thyroid nodule - one nodule > 1 cm is suspicious  For potential malignancy --> planned for surgery.     Primary hyperparathyroidism - Diagnosed by bloodwork from endocrinologist. No constipation, issues with kidney stones, somewhat achey all over, and somewhat derepressed, no si or hi. Depression 7/10. She does feel that fluoxetine is helping.     Depression, see above.     Current Outpatient Prescriptions on File Prior to Visit   Medication Sig Dispense Refill    calcitRIOL (ROCALTROL) 0.5 MCG Cap TAKE ONE CAPSULE BY MOUTH ONCE DAILY 90 capsule 0    CHONDROITIN SULFATE A (CHONDROITIN SULFATE ORAL) Take by mouth once daily at 6am.       ergocalciferol (ERGOCALCIFEROL) 50,000 unit Cap Take 1 capsule (50,000 Units total) by mouth once daily. 5 capsule 0    estradiol (ESTRACE) 0.5 MG tablet Take 1 tablet (0.5 mg total) by mouth once daily. 30 tablet 11    fluoxetine (PROZAC) 40 MG capsule Take 1 capsule (40 mg total) by mouth every morning. (Patient taking differently: Take 40 mg by mouth once daily. ) 90 capsule 3    irbesartan (AVAPRO) 300 MG tablet TAKE ONE TABLET BY MOUTH IN THE EVENING 30 tablet 0    medroxyPROGESTERone (PROVERA) 2.5 MG tablet Take 1 tablet (2.5 mg total) by mouth once daily. 30 tablet 11    PROCTO-RAZA 1 % crpe INSERT ONE APPLICATORFUL RECTALLY TWICE DAILY AS NEEDED 28.4 g 30    sodium bicarbonate 325 MG tablet Take 325 mg by mouth 4 (four) times daily.      trazodone (DESYREL) 100 MG tablet Take 1 tablet (100 mg total) by mouth every evening. 90 tablet 1    triamcinolone acetonide 0.1% (KENALOG) 0.1 % cream Apply topically 2 (two) times daily. 45 g 1     No current facility-administered medications on file prior to visit.        Past Medical History:   Diagnosis Date    Hypertension     Kidney disease, chronic,  stage III (GFR 30-59 ml/min)     Obesity     Osteoporosis        Family History   Problem Relation Age of Onset    Hypertension Mother     Heart disease Mother     Macular degeneration Mother     Hypertension Father     Cataracts Father     Glaucoma Father     Macular degeneration Father     Glaucoma Brother     Macular degeneration Brother     No Known Problems Sister     No Known Problems Maternal Aunt     No Known Problems Maternal Uncle     No Known Problems Paternal Aunt     No Known Problems Paternal Uncle     Macular degeneration Maternal Grandmother     Macular degeneration Maternal Grandfather     Macular degeneration Paternal Grandmother     Glaucoma Paternal Grandfather     Macular degeneration Paternal Grandfather     Amblyopia Neg Hx     Blindness Neg Hx     Cancer Neg Hx     Diabetes Neg Hx     Retinal detachment Neg Hx     Strabismus Neg Hx     Stroke Neg Hx     Thyroid disease Neg Hx         reports that she has quit smoking. She has never used smokeless tobacco. She reports that she drinks alcohol. She reports that she does not use drugs.    Review of Systems   Constitutional: Negative for chills and fever.   Gastrointestinal: Negative for constipation, nausea and vomiting.       Objective:     Vitals:    09/29/17 1521   BP: 130/66   Pulse: 84   Resp: 16   Temp: 98.1 °F (36.7 °C)        Physical Exam   Constitutional: She appears well-developed. No distress.   HENT:   Head: Normocephalic and atraumatic.   Eyes: Conjunctivae are normal. No scleral icterus.   Pulmonary/Chest: Effort normal.   Musculoskeletal: She exhibits edema (1+ up to mid shin bilaterally. ).   Neurological: She is alert.   Skin: She is not diaphoretic.   Psychiatric: She has a normal mood and affect. Her behavior is normal.   Vitals reviewed.      Assessment:       1. Primary hyperparathyroidism    2. CKD (chronic kidney disease), stage IV    3. Thyroid nodule    4. Pedal edema        Plan:       Luisana  was seen today for hypertension and swelling.    Diagnoses and all orders for this visit:    Primary hyperparathyroidism  Lab results reviewed from outside lab that suggest primary hyperparat. This showed be addressed by partial thyroidectomy.     CKD (chronic kidney disease), stage IV  chronic - stable - reviewed outside labs GFr 32.     Thyroid nodule  Pt wanted my opinion as to whether or not she should proceed with surgery for her nodule that showed a 40% probability that it has cancerous potential. I agreed with endocrines [facility] to proceed with surgery.   Pedal edema  Pt and I discussed the frustrating nature of lower extremity edema, the lack of a definitive cure, and cumbersome treatments that do not work very well. I advised her to use above the knee compression stockings put on first thing in the am, to elevate her feet when sitting, and to engage in exercise with the ultimate goal of weightloss. I also advised proper skin care and use of a good emollient to keep skin barrier intact.     Pt to inform me if they develop skin break down, erythema, fevers, or chills, or if she has one leg swelling more than the other.            Return in about 4 weeks (around 10/27/2017).        Pt verbalized understanding and agreed with our plan.

## 2017-10-02 ENCOUNTER — TELEPHONE (OUTPATIENT)
Dept: NEPHROLOGY | Facility: CLINIC | Age: 72
End: 2017-10-02

## 2017-10-02 ENCOUNTER — TELEPHONE (OUTPATIENT)
Dept: FAMILY MEDICINE | Facility: CLINIC | Age: 72
End: 2017-10-02

## 2017-10-02 RX ORDER — CIPROFLOXACIN 500 MG/1
500 TABLET ORAL 2 TIMES DAILY
Qty: 20 TABLET | Refills: 0 | Status: SHIPPED | OUTPATIENT
Start: 2017-10-02 | End: 2018-01-12

## 2017-10-02 NOTE — TELEPHONE ENCOUNTER
Was this for janes?        ----- Message from Rox Clinton sent at 10/2/2017  9:07 AM CDT -----  Contact: self  Pt would like more information on the psychiatrist she was referred to in June 2017.   please call and advise her at [phone].    Thanks

## 2017-10-04 ENCOUNTER — TELEPHONE (OUTPATIENT)
Dept: NEPHROLOGY | Facility: CLINIC | Age: 72
End: 2017-10-04

## 2017-10-06 ENCOUNTER — TELEPHONE (OUTPATIENT)
Dept: NEPHROLOGY | Facility: CLINIC | Age: 72
End: 2017-10-06

## 2017-10-06 ENCOUNTER — TELEPHONE (OUTPATIENT)
Dept: FAMILY MEDICINE | Facility: CLINIC | Age: 72
End: 2017-10-06

## 2017-10-06 NOTE — TELEPHONE ENCOUNTER
Pt requesting for Urine culture and recent labs results. Notified urine culture came back positive. Cipro sent to pharmacy. Please advise of CBC results    Component      Latest Ref Rng & Units 9/29/2017   WBC      3.90 - 12.70 K/uL 8.43   RBC      4.00 - 5.40 M/uL 3.39 (L)   Hemoglobin      12.0 - 16.0 g/dL 10.3 (L)   Hematocrit      37.0 - 48.5 % 32.9 (L)   MCV      82 - 98 fL 97   MCH      27.0 - 31.0 pg 30.4   MCHC      32.0 - 36.0 g/dL 31.3 (L)   RDW      11.5 - 14.5 % 14.6 (H)   Platelets      150 - 350 K/uL 251   MPV      9.2 - 12.9 fL 10.4   Gran #      1.8 - 7.7 K/uL 5.4   Lymph #      1.0 - 4.8 K/uL 2.0   Mono #      0.3 - 1.0 K/uL 0.8   Eos #      0.0 - 0.5 K/uL 0.2   Baso #      0.00 - 0.20 K/uL 0.02   Gran%      38.0 - 73.0 % 63.7   Lymph%      18.0 - 48.0 % 24.1   Mono%      4.0 - 15.0 % 9.1   Eosinophil%      0.0 - 8.0 % 2.7   Basophil%      0.0 - 1.9 % 0.2   Differential Method       Automated

## 2017-10-20 ENCOUNTER — OFFICE VISIT (OUTPATIENT)
Dept: SLEEP MEDICINE | Facility: CLINIC | Age: 72
End: 2017-10-20
Payer: MEDICARE

## 2017-10-20 VITALS
HEIGHT: 69 IN | BODY MASS INDEX: 37.16 KG/M2 | SYSTOLIC BLOOD PRESSURE: 146 MMHG | DIASTOLIC BLOOD PRESSURE: 82 MMHG | HEART RATE: 80 BPM | OXYGEN SATURATION: 97 % | WEIGHT: 250.88 LBS

## 2017-10-20 DIAGNOSIS — R09.81 NASAL CONGESTION: ICD-10-CM

## 2017-10-20 DIAGNOSIS — G47.33 OSA (OBSTRUCTIVE SLEEP APNEA): Primary | ICD-10-CM

## 2017-10-20 DIAGNOSIS — E66.9 OBESITY, UNSPECIFIED CLASSIFICATION, UNSPECIFIED OBESITY TYPE, UNSPECIFIED WHETHER SERIOUS COMORBIDITY PRESENT: ICD-10-CM

## 2017-10-20 PROCEDURE — 99999 PR PBB SHADOW E&M-EST. PATIENT-LVL IV: CPT | Mod: PBBFAC,,, | Performed by: INTERNAL MEDICINE

## 2017-10-20 PROCEDURE — 99214 OFFICE O/P EST MOD 30 MIN: CPT | Mod: S$PBB,,, | Performed by: INTERNAL MEDICINE

## 2017-10-20 PROCEDURE — 99214 OFFICE O/P EST MOD 30 MIN: CPT | Mod: PBBFAC,PO | Performed by: INTERNAL MEDICINE

## 2017-10-20 NOTE — PATIENT INSTRUCTIONS
Dentists for dental appliance  [place]    - Dr. Se Mendez     [phone] [facility] [address], [address]  [place], [place] [address]    - Sameer Dunne Jr      [phone]  [address] [address]    - Dr. David Giles       [phone]  David Giles   [address] [place] [id number]    -Dr. Geovani Caraballo   [phone]  [facility]    -Dr. Adama Davies   [phone]      [facility]    -[facility]   [address]  [place] [address]    - Therese Harrison [phone]

## 2017-10-20 NOTE — PROGRESS NOTES
Luisana Chun  was seen as a follow up.     CHIEF COMPLAINT:    Chief Complaint   Patient presents with    Sleep Apnea       HISTORY OF PRESENT ILLNESS: Luisana Chun is a 72 y.o. female is here for sleep evaluation.   Our first encounter was 5/11/17.  Patient is interested in bariatric surgery.  Patient sleep alone and is not sure if she snores.  No witnessed apnea.  Feeling rested upon awake.  No parasomnia.  No cataplexy.  Patient was told to have raheel based sleep study in 2013 in [place].  Did not tolerate.  No weigh changes.      +frequent cramping upon awake.        [place] Sleepiness Scale score during initial sleep evaluation was 3.    S/p sleep study since last visit 5/26/17 with ahi of 18.  Patient was set up with apap.  Patient did not tolerated apap.  During last appointment, patient was switched to full face.  +sinus congestion since 8/15/17.      SLEEP ROUTINE:  Activity the hour prior to sleep: read    Bed partner:  alone  Time to bed:  11 pm   Lights off:  yes  Sleep onset latency:  20 minutes        Disruptions or awakenings:    8 times (no difficulty going back to sleep)    Wakeup time:      8:30 am   Perceived sleep quality:  tire      Daytime naps:      60 minutes (rested)  Weekend sleep routine:      11 pm to 9 am   Caffeine use: 2 mugs of coffee in am   exercise habit:   Gardening and minimal walking      PAST MEDICAL HISTORY:    Active Ambulatory Problems     Diagnosis Date Noted    Osteoporosis without pathological fracture 02/16/2016    Menopausal state 02/16/2016    Secondary hyperparathyroidism 05/20/2016    Hypertension, uncontrolled 06/07/2016    Obesity, Class II, BMI 35-39.9 12/07/2016    Vitamin D deficiency 03/23/2017    Normocytic anemia 05/04/2017    Rosacea 06/28/2017    Bilateral chronic knee pain 08/16/2017    Sleep disturbance 08/16/2017    RAHEEL (obstructive sleep apnea) 08/16/2017    Metabolic acidosis 09/21/2017    CKD (chronic kidney disease), stage IV  09/21/2017    Hypertensive CKD (chronic kidney disease) 09/21/2017    Primary hyperparathyroidism 09/29/2017    Pedal edema 09/29/2017     Resolved Ambulatory Problems     Diagnosis Date Noted    Annual physical exam 02/16/2016    Sebaceous cyst 02/16/2016    CKD (chronic kidney disease), stage III 05/20/2016    Spinal axis tumor 06/10/2016    Bone lesion 06/30/2016    Cancer screening 07/05/2016    History of anemia due to chronic kidney disease 05/04/2017     Past Medical History:   Diagnosis Date    Hypertension     Kidney disease, chronic, stage III (GFR 30-59 ml/min)     Obesity     Osteoporosis                 PAST SURGICAL HISTORY:    Past Surgical History:   Procedure Laterality Date    APPENDECTOMY      BACK SURGERY  february 2016    COLONOSCOPY      ESOPHAGOGASTRODUODENOSCOPY      KNEE SURGERY Bilateral     left toe Left     TONSILLECTOMY      tumor biopsy thyroid           FAMILY HISTORY:                Family History   Problem Relation Age of Onset    Hypertension Mother     Heart disease Mother     Macular degeneration Mother     Hypertension Father     Cataracts Father     Glaucoma Father     Macular degeneration Father     Glaucoma Brother     Macular degeneration Brother     No Known Problems Sister     No Known Problems Maternal Aunt     No Known Problems Maternal Uncle     No Known Problems Paternal Aunt     No Known Problems Paternal Uncle     Macular degeneration Maternal Grandmother     Macular degeneration Maternal Grandfather     Macular degeneration Paternal Grandmother     Glaucoma Paternal Grandfather     Macular degeneration Paternal Grandfather     Amblyopia Neg Hx     Blindness Neg Hx     Cancer Neg Hx     Diabetes Neg Hx     Retinal detachment Neg Hx     Strabismus Neg Hx     Stroke Neg Hx     Thyroid disease Neg Hx        SOCIAL HISTORY:          Tobacco:   History   Smoking Status    Former Smoker   Smokeless Tobacco    Never Used  "      alcohol use:    History   Alcohol Use    0.0 oz/week     Comment: social                 Occupation:  Real estate management    ALLERGIES:  Review of patient's allergies indicates:  No Known Allergies    CURRENT MEDICATIONS:    Current Outpatient Prescriptions   Medication Sig Dispense Refill    calcitRIOL (ROCALTROL) 0.5 MCG Cap TAKE ONE CAPSULE BY MOUTH ONCE DAILY 90 capsule 0    CHONDROITIN SULFATE A (CHONDROITIN SULFATE ORAL) Take by mouth once daily at 6am.       ciprofloxacin HCl (CIPRO) 500 MG tablet Take 1 tablet (500 mg total) by mouth 2 (two) times daily. 20 tablet 0    ergocalciferol (ERGOCALCIFEROL) 50,000 unit Cap Take 1 capsule (50,000 Units total) by mouth once daily. 5 capsule 0    estradiol (ESTRACE) 0.5 MG tablet Take 1 tablet (0.5 mg total) by mouth once daily. 30 tablet 11    fluoxetine (PROZAC) 40 MG capsule Take 1 capsule (40 mg total) by mouth every morning. (Patient taking differently: Take 40 mg by mouth once daily. ) 90 capsule 3    irbesartan (AVAPRO) 300 MG tablet TAKE ONE TABLET BY MOUTH IN THE EVENING 30 tablet 0    medroxyPROGESTERone (PROVERA) 2.5 MG tablet Take 1 tablet (2.5 mg total) by mouth once daily. 30 tablet 11    PROCTO-RAZA 1 % crpe INSERT ONE APPLICATORFUL RECTALLY TWICE DAILY AS NEEDED 28.4 g 30    sodium bicarbonate 325 MG tablet Take 325 mg by mouth 4 (four) times daily.      trazodone (DESYREL) 100 MG tablet Take 1 tablet (100 mg total) by mouth every evening. 90 tablet 1    triamcinolone acetonide 0.1% (KENALOG) 0.1 % cream Apply topically 2 (two) times daily. 45 g 1     No current facility-administered medications for this visit.                   REVIEW OF SYSTEMS:   No acute changes from previous encounter dated 5/11/2017 with exceptions mentioned in HPI.             PHYSICAL EXAM:  Vitals:    10/20/17 1406   BP: (!) 146/82   Pulse: 80   SpO2: 97%   Weight: 113.8 kg (250 lb 14.1 oz)   Height: 5' 9" (1.753 m)   PainSc:   8   PainLoc: Neck     Body " mass index is 37.05 kg/m².     GENERAL: Normal development, well groomed  HEENT:  Conjunctivae are non-erythematous; Pupils equal, round, and reactive to light; Nose is symmetrical; Nasal mucosa is pink and moist; Septum is midline; Inferior turbinates are normal; Nasal airflow is normal; Posterior pharynx is pink; Modified Mallampati: 3; Posterior palate is normal; Tonsils +1; Uvula is normal and pink;Tongue is normal; edentulous; No TMJ tenderness; Jaw opening and protrusion without click and without discomfort.  NECK: Supple. Neck circumference is 17 inches. No thyromegaly. No palpable nodes.     SKIN: On face and neck: No abrasions, no rashes, no lesions.  No subcutaneous nodules are palpable.  RESPIRATORY: Chest is clear to auscultation.  Normal chest expansion and non-labored breathing at rest.  CARDIOVASCULAR: Normal S1, S2.  No murmurs, gallops or rubs. No carotid bruits bilaterally.  EXTREMITIES: No edema. No clubbing. No cyanosis. Station normal. Gait normal.        NEURO/PSYCH: Oriented to time, place and person. Normal attention span and concentration. Affect is full. Mood is normal.                                              DATA   5/26/17 ahi of 18; supine ahi of 78  Lab Results   Component Value Date    TSH 1.663 12/15/2016     ASSESSMENT    ICD-10-CM ICD-9-CM    1. KING (obstructive sleep apnea) G47.33 327.23 Ambulatory consult to Dentistry   2. Nasal congestion R09.81 478.19 Ambulatory consult to ENT   3. Obesity, unspecified classification, unspecified obesity type, unspecified whether serious comorbidity present E66.9 278.00        PLAN:    Sleep Apnea NEC. - interrogation of cpap confirmed good compliance with high leak.  Unclear of reason for high leakage.  May related to poor mask fitting.  However, patient did not have old mask.  Patient declined in lab titration.  Oral appliance may be an issue due edentulous state.  Patient would like to pursue oral appliance.  Will refer.      Obesity -  planned for bariatric surgery.  Encourage patient to use apap nightly.  If able to comply with apap during perioperative period, patient is optimized from sleep/pulmonary perspective for bariatric surgery.      Insomnia - maintenance is the issue.  May related to hyperarousal from mask leak.  Will monitor and address after apap.     Nasal congestion - sounds like sinusitis that's improving.  Persistent vertigo.  Will refer to ent.      Education: During our discussion today, we talked about the etiology of obstructive sleep apnea as well as the potential ramifications of untreated sleep apnea, which could include daytime sleepiness, hypertension, heart disease and/or stroke.     Precautions: The patient was advised to abstain from driving should they feel sleepy or drowsy.     Patient will Return in about 3 months (around 1/20/2018).    This is 25 minutes visit, over 50% of time spent in direct consultation with patient.

## 2017-10-23 ENCOUNTER — LAB VISIT (OUTPATIENT)
Dept: LAB | Facility: HOSPITAL | Age: 72
End: 2017-10-23
Attending: INTERNAL MEDICINE
Payer: MEDICARE

## 2017-10-23 DIAGNOSIS — N18.4 CKD (CHRONIC KIDNEY DISEASE), STAGE IV: ICD-10-CM

## 2017-10-23 LAB
ALBUMIN SERPL BCP-MCNC: 3.7 G/DL
ANION GAP SERPL CALC-SCNC: 6 MMOL/L
BASOPHILS # BLD AUTO: 0.04 K/UL
BASOPHILS NFR BLD: 0.6 %
BUN SERPL-MCNC: 30 MG/DL
CALCIUM SERPL-MCNC: 9.5 MG/DL
CHLORIDE SERPL-SCNC: 109 MMOL/L
CO2 SERPL-SCNC: 21 MMOL/L
CREAT SERPL-MCNC: 1.7 MG/DL
DIFFERENTIAL METHOD: ABNORMAL
EOSINOPHIL # BLD AUTO: 0.3 K/UL
EOSINOPHIL NFR BLD: 4.7 %
ERYTHROCYTE [DISTWIDTH] IN BLOOD BY AUTOMATED COUNT: 12.9 %
EST. GFR  (AFRICAN AMERICAN): 34.2 ML/MIN/1.73 M^2
EST. GFR  (NON AFRICAN AMERICAN): 29.7 ML/MIN/1.73 M^2
GLUCOSE SERPL-MCNC: 111 MG/DL
HCT VFR BLD AUTO: 35.5 %
HGB BLD-MCNC: 11.3 G/DL
IMM GRANULOCYTES # BLD AUTO: 0.02 K/UL
IMM GRANULOCYTES NFR BLD AUTO: 0.3 %
LYMPHOCYTES # BLD AUTO: 1.9 K/UL
LYMPHOCYTES NFR BLD: 26.8 %
MCH RBC QN AUTO: 31.2 PG
MCHC RBC AUTO-ENTMCNC: 31.8 G/DL
MCV RBC AUTO: 98 FL
MONOCYTES # BLD AUTO: 0.6 K/UL
MONOCYTES NFR BLD: 8.6 %
NEUTROPHILS # BLD AUTO: 4.1 K/UL
NEUTROPHILS NFR BLD: 59 %
NRBC BLD-RTO: 0 /100 WBC
PHOSPHATE SERPL-MCNC: 2.7 MG/DL
PLATELET # BLD AUTO: 250 K/UL
PMV BLD AUTO: 11 FL
POTASSIUM SERPL-SCNC: 4.4 MMOL/L
PTH-INTACT SERPL-MCNC: 100 PG/ML
RBC # BLD AUTO: 3.62 M/UL
SODIUM SERPL-SCNC: 136 MMOL/L
WBC # BLD AUTO: 6.97 K/UL

## 2017-10-23 PROCEDURE — 85025 COMPLETE CBC W/AUTO DIFF WBC: CPT

## 2017-10-23 PROCEDURE — 36415 COLL VENOUS BLD VENIPUNCTURE: CPT | Mod: PO

## 2017-10-23 PROCEDURE — 83970 ASSAY OF PARATHORMONE: CPT

## 2017-10-23 PROCEDURE — 80069 RENAL FUNCTION PANEL: CPT

## 2017-10-24 DIAGNOSIS — R82.90 ABNORMAL URINALYSIS: ICD-10-CM

## 2017-10-27 ENCOUNTER — LAB VISIT (OUTPATIENT)
Dept: LAB | Facility: HOSPITAL | Age: 72
End: 2017-10-27
Attending: INTERNAL MEDICINE
Payer: MEDICARE

## 2017-10-27 ENCOUNTER — OFFICE VISIT (OUTPATIENT)
Dept: FAMILY MEDICINE | Facility: CLINIC | Age: 72
End: 2017-10-27
Payer: MEDICARE

## 2017-10-27 VITALS
TEMPERATURE: 99 F | RESPIRATION RATE: 16 BRPM | DIASTOLIC BLOOD PRESSURE: 80 MMHG | HEART RATE: 76 BPM | HEIGHT: 69 IN | WEIGHT: 251.56 LBS | OXYGEN SATURATION: 96 % | BODY MASS INDEX: 37.26 KG/M2 | SYSTOLIC BLOOD PRESSURE: 138 MMHG

## 2017-10-27 DIAGNOSIS — K21.9 GASTROESOPHAGEAL REFLUX DISEASE, ESOPHAGITIS PRESENCE NOT SPECIFIED: Primary | ICD-10-CM

## 2017-10-27 DIAGNOSIS — Z23 NEED FOR INFLUENZA VACCINATION: ICD-10-CM

## 2017-10-27 DIAGNOSIS — R82.90 ABNORMAL URINALYSIS: ICD-10-CM

## 2017-10-27 DIAGNOSIS — G47.33 OSA (OBSTRUCTIVE SLEEP APNEA): ICD-10-CM

## 2017-10-27 DIAGNOSIS — J32.0 CHRONIC MAXILLARY SINUSITIS: ICD-10-CM

## 2017-10-27 PROCEDURE — 99999 PR PBB SHADOW E&M-EST. PATIENT-LVL III: CPT | Mod: PBBFAC,,, | Performed by: FAMILY MEDICINE

## 2017-10-27 PROCEDURE — 87086 URINE CULTURE/COLONY COUNT: CPT

## 2017-10-27 PROCEDURE — G0008 ADMIN INFLUENZA VIRUS VAC: HCPCS | Mod: PBBFAC,PO

## 2017-10-27 PROCEDURE — 87186 SC STD MICRODIL/AGAR DIL: CPT

## 2017-10-27 PROCEDURE — 99214 OFFICE O/P EST MOD 30 MIN: CPT | Mod: S$PBB,,, | Performed by: FAMILY MEDICINE

## 2017-10-27 PROCEDURE — 87077 CULTURE AEROBIC IDENTIFY: CPT

## 2017-10-27 PROCEDURE — 87088 URINE BACTERIA CULTURE: CPT

## 2017-10-27 PROCEDURE — 99213 OFFICE O/P EST LOW 20 MIN: CPT | Mod: PBBFAC,PO | Performed by: FAMILY MEDICINE

## 2017-10-27 RX ORDER — FLUTICASONE PROPIONATE 50 MCG
1 SPRAY, SUSPENSION (ML) NASAL 2 TIMES DAILY
Qty: 1 BOTTLE | Refills: 3 | Status: SHIPPED | OUTPATIENT
Start: 2017-10-27 | End: 2018-01-23 | Stop reason: SDUPTHER

## 2017-10-27 NOTE — PROGRESS NOTES
"Subjective:       Patient ID: Luisana Chun is a 72 y.o. female.    Chief Complaint: Neck Pain (still have pain - follow up ); Results (follow up ); Gastroesophageal Reflux; and Consult (stated CPAP machine causing pt to feel sick, and requesting recheck urine if still have UTI )    HPI    GERD - pt has had issues with this in the past, but her sxs have resurfaces over the last few weeks.     No otc therapy tried.     Sinusitis - pt has sinus drainage x 2 months with greenish drainage with sinus pressure and post nasal drip. She also states that she can "smell the infection".     Her sxs have improved 80% at this point. She is taking zyrtec which is helping.     KING - pt stopped using cpap dur to abd bloating, belching, sinus congestion, and resp issues she attributed to these.         Current Outpatient Prescriptions on File Prior to Visit   Medication Sig Dispense Refill    calcitRIOL (ROCALTROL) 0.5 MCG Cap TAKE ONE CAPSULE BY MOUTH ONCE DAILY 90 capsule 0    CHONDROITIN SULFATE A (CHONDROITIN SULFATE ORAL) Take by mouth once daily at 6am.       ergocalciferol (ERGOCALCIFEROL) 50,000 unit Cap Take 1 capsule (50,000 Units total) by mouth once daily. 5 capsule 0    estradiol (ESTRACE) 0.5 MG tablet Take 1 tablet (0.5 mg total) by mouth once daily. 30 tablet 11    fluoxetine (PROZAC) 40 MG capsule Take 1 capsule (40 mg total) by mouth every morning. (Patient taking differently: Take 40 mg by mouth once daily. ) 90 capsule 3    irbesartan (AVAPRO) 300 MG tablet TAKE ONE TABLET BY MOUTH IN THE EVENING 30 tablet 0    medroxyPROGESTERone (PROVERA) 2.5 MG tablet Take 1 tablet (2.5 mg total) by mouth once daily. 30 tablet 11    PROCTO-RAZA 1 % crpe INSERT ONE APPLICATORFUL RECTALLY TWICE DAILY AS NEEDED 28.4 g 30    sodium bicarbonate 325 MG tablet Take 325 mg by mouth 4 (four) times daily.      trazodone (DESYREL) 100 MG tablet Take 1 tablet (100 mg total) by mouth every evening. 90 tablet 1    " ciprofloxacin HCl (CIPRO) 500 MG tablet Take 1 tablet (500 mg total) by mouth 2 (two) times daily. 20 tablet 0    triamcinolone acetonide 0.1% (KENALOG) 0.1 % cream Apply topically 2 (two) times daily. 45 g 1     No current facility-administered medications on file prior to visit.        Past Medical History:   Diagnosis Date    Hypertension     Kidney disease, chronic, stage III (GFR 30-59 ml/min)     Obesity     Osteoporosis        Family History   Problem Relation Age of Onset    Hypertension Mother     Heart disease Mother     Macular degeneration Mother     Hypertension Father     Cataracts Father     Glaucoma Father     Macular degeneration Father     Glaucoma Brother     Macular degeneration Brother     No Known Problems Sister     No Known Problems Maternal Aunt     No Known Problems Maternal Uncle     No Known Problems Paternal Aunt     No Known Problems Paternal Uncle     Macular degeneration Maternal Grandmother     Macular degeneration Maternal Grandfather     Macular degeneration Paternal Grandmother     Glaucoma Paternal Grandfather     Macular degeneration Paternal Grandfather     Amblyopia Neg Hx     Blindness Neg Hx     Cancer Neg Hx     Diabetes Neg Hx     Retinal detachment Neg Hx     Strabismus Neg Hx     Stroke Neg Hx     Thyroid disease Neg Hx         reports that she has quit smoking. She has never used smokeless tobacco. She reports that she drinks alcohol. She reports that she does not use drugs.    Review of Systems   Constitutional: Negative for chills and fever.   HENT: Positive for congestion, rhinorrhea and sinus pressure.        Objective:     Vitals:    10/27/17 1329   BP: 138/80   Pulse: 76   Resp: 16   Temp: 98.6 °F (37 °C)        Physical Exam   Constitutional: She appears well-developed. No distress.   HENT:   Head: Normocephalic and atraumatic.       Eyes: Conjunctivae are normal. Right eye exhibits no discharge. Left eye exhibits no discharge. No  scleral icterus.   Cardiovascular: Normal rate, regular rhythm and normal heart sounds.  Exam reveals no gallop and no friction rub.    No murmur heard.  Pulmonary/Chest: Effort normal and breath sounds normal. No respiratory distress. She has no wheezes. She has no rales.   Neurological: She is alert.   Skin: She is not diaphoretic.   Psychiatric: She has a normal mood and affect.   Vitals reviewed.      Assessment:       1. Gastroesophageal reflux disease, esophagitis presence not specified    2. Chronic maxillary sinusitis    3. KING (obstructive sleep apnea)    4. Need for influenza vaccination        Plan:       Luiasna was seen today for neck pain, results, gastroesophageal reflux and consult.    Diagnoses and all orders for this visit:    Gastroesophageal reflux disease, esophagitis presence not specified  -Pts hx and pex suggest int GERD  -Pt denies red flag sxs such as difficulty swallowing, painful swallowing, or unexplained weightloss  -Advised a trial of H2 blocker as this is a better prn med, and has less long term effects than taking PPIs continuously. Pt agreed, and will notify me if sxs are not controlled.       Chronic maxillary sinusitis  Initiated Flonase BID.     KING (obstructive sleep apnea)  CPAP discontinued. Patient has multiple concerns and complaints about CPAP machine and refuses to continue.         Return in about 3 months (around 1/27/2018).        Pt verbalized understanding and agreed with our plan.

## 2017-10-27 NOTE — PROGRESS NOTES
Zoster Vaccine Done - unable to find right pharmacy pt had vaccine done at. Pt will call insurance company to get information       Influenza Vaccine Ok to get today

## 2017-10-29 LAB — BACTERIA UR CULT: NORMAL

## 2017-10-30 ENCOUNTER — TELEPHONE (OUTPATIENT)
Dept: NEPHROLOGY | Facility: CLINIC | Age: 72
End: 2017-10-30

## 2017-10-30 RX ORDER — CEPHALEXIN 250 MG/1
500 CAPSULE ORAL EVERY 8 HOURS
Qty: 30 CAPSULE | Refills: 0 | Status: SHIPPED | OUTPATIENT
Start: 2017-10-30 | End: 2017-11-09

## 2017-10-30 NOTE — TELEPHONE ENCOUNTER
Please contact patient and inform her of E. Coli UTI and need to start Keflex per culture report. Sending Rx to her pharmacy. Thanks.

## 2017-11-06 DIAGNOSIS — E55.9 HYPOVITAMINOSIS D: ICD-10-CM

## 2017-11-06 RX ORDER — CALCITRIOL 0.5 UG/1
CAPSULE ORAL
Qty: 90 CAPSULE | Refills: 1 | Status: SHIPPED | OUTPATIENT
Start: 2017-11-06 | End: 2018-06-25

## 2017-11-16 ENCOUNTER — TELEPHONE (OUTPATIENT)
Dept: FAMILY MEDICINE | Facility: CLINIC | Age: 72
End: 2017-11-16

## 2017-11-16 NOTE — TELEPHONE ENCOUNTER
----- Message from Loreto Smith sent at 11/16/2017  9:36 AM CST -----  Patient wants to stop and  copy of End of Visit Summary from last visit. Please call at [phone] Thank you!

## 2017-11-30 DIAGNOSIS — M85.80 OSTEOPENIA: ICD-10-CM

## 2017-11-30 RX ORDER — TRAZODONE HYDROCHLORIDE 50 MG/1
TABLET ORAL
Qty: 90 TABLET | Refills: 1 | OUTPATIENT
Start: 2017-11-30

## 2017-11-30 RX ORDER — ESTRADIOL 0.5 MG/1
TABLET ORAL
Qty: 90 TABLET | Refills: 2 | Status: SHIPPED | OUTPATIENT
Start: 2017-11-30 | End: 2017-12-15 | Stop reason: SDUPTHER

## 2017-11-30 RX ORDER — MEDROXYPROGESTERONE ACETATE 2.5 MG/1
TABLET ORAL
Qty: 90 TABLET | Refills: 2 | Status: SHIPPED | OUTPATIENT
Start: 2017-11-30 | End: 2017-12-15 | Stop reason: SDUPTHER

## 2017-12-12 NOTE — TELEPHONE ENCOUNTER
----- Message from Nicole Sarabia sent at 8/10/2017  1:39 PM CDT -----  Contact: [facility]  PT needs refill on Ambien . Pls call [facility] [phone], Thanks.....Domenica   Patient was notified and verbalized understanding.  Order for US was done.

## 2017-12-15 DIAGNOSIS — M85.80 OSTEOPENIA: ICD-10-CM

## 2017-12-15 DIAGNOSIS — E55.9 HYPOVITAMINOSIS D: ICD-10-CM

## 2017-12-15 DIAGNOSIS — F32.1 MAJOR DEPRESSIVE DISORDER, SINGLE EPISODE, MODERATE: ICD-10-CM

## 2017-12-16 RX ORDER — MEDROXYPROGESTERONE ACETATE 2.5 MG/1
TABLET ORAL
Qty: 90 TABLET | Refills: 2 | Status: SHIPPED | OUTPATIENT
Start: 2017-12-16 | End: 2018-01-23

## 2017-12-16 RX ORDER — ESTRADIOL 0.5 MG/1
TABLET ORAL
Qty: 90 TABLET | Refills: 2 | Status: SHIPPED | OUTPATIENT
Start: 2017-12-16 | End: 2018-05-10

## 2017-12-18 ENCOUNTER — TELEPHONE (OUTPATIENT)
Dept: FAMILY MEDICINE | Facility: CLINIC | Age: 72
End: 2017-12-18

## 2017-12-18 DIAGNOSIS — I10 BENIGN ESSENTIAL HYPERTENSION: ICD-10-CM

## 2017-12-18 DIAGNOSIS — N18.30 CKD (CHRONIC KIDNEY DISEASE), STAGE III: ICD-10-CM

## 2017-12-18 RX ORDER — TRAZODONE HYDROCHLORIDE 50 MG/1
TABLET ORAL
Qty: 90 TABLET | Refills: 1 | OUTPATIENT
Start: 2017-12-18

## 2017-12-18 RX ORDER — FLUOXETINE HYDROCHLORIDE 40 MG/1
CAPSULE ORAL
Qty: 90 CAPSULE | Refills: 2 | Status: SHIPPED | OUTPATIENT
Start: 2017-12-18 | End: 2018-02-16 | Stop reason: SDUPTHER

## 2017-12-18 RX ORDER — IRBESARTAN 300 MG/1
300 TABLET ORAL NIGHTLY
Qty: 90 TABLET | Refills: 0 | Status: SHIPPED | OUTPATIENT
Start: 2017-12-18 | End: 2018-02-07 | Stop reason: SDUPTHER

## 2017-12-18 RX ORDER — CALCITRIOL 0.5 UG/1
CAPSULE ORAL
Qty: 90 CAPSULE | Refills: 3 | Status: SHIPPED | OUTPATIENT
Start: 2017-12-18 | End: 2018-01-23 | Stop reason: SDUPTHER

## 2017-12-18 NOTE — TELEPHONE ENCOUNTER
----- Message from Luz Becker sent at 12/18/2017  3:08 PM CST -----  Contact: Benite[facility] with [facility]  Refill on ranitidine (ZANTAC) 150 MG capsule . Can patient get tablet instead of capsule? Benitez can be reached at [phone].        Thanks,

## 2017-12-26 RX ORDER — TRAZODONE HYDROCHLORIDE 50 MG/1
TABLET ORAL
Qty: 90 TABLET | Refills: 1 | OUTPATIENT
Start: 2017-12-26

## 2018-01-08 ENCOUNTER — OFFICE VISIT (OUTPATIENT)
Dept: FAMILY MEDICINE | Facility: CLINIC | Age: 73
End: 2018-01-08
Payer: MEDICARE

## 2018-01-08 ENCOUNTER — LAB VISIT (OUTPATIENT)
Dept: LAB | Facility: HOSPITAL | Age: 73
End: 2018-01-08
Attending: NURSE PRACTITIONER
Payer: MEDICARE

## 2018-01-08 ENCOUNTER — TELEPHONE (OUTPATIENT)
Dept: FAMILY MEDICINE | Facility: CLINIC | Age: 73
End: 2018-01-08

## 2018-01-08 VITALS
HEART RATE: 75 BPM | WEIGHT: 259.25 LBS | TEMPERATURE: 98 F | RESPIRATION RATE: 16 BRPM | DIASTOLIC BLOOD PRESSURE: 70 MMHG | SYSTOLIC BLOOD PRESSURE: 108 MMHG | HEIGHT: 69 IN | OXYGEN SATURATION: 97 % | BODY MASS INDEX: 38.4 KG/M2

## 2018-01-08 DIAGNOSIS — N25.81 SECONDARY HYPERPARATHYROIDISM: ICD-10-CM

## 2018-01-08 DIAGNOSIS — N18.4 CKD (CHRONIC KIDNEY DISEASE), STAGE IV: ICD-10-CM

## 2018-01-08 DIAGNOSIS — E04.2 MULTINODULAR GOITER: Primary | ICD-10-CM

## 2018-01-08 DIAGNOSIS — C73 PRIMARY THYROID CANCER: Primary | ICD-10-CM

## 2018-01-08 DIAGNOSIS — E55.9 VITAMIN D DEFICIENCY: ICD-10-CM

## 2018-01-08 DIAGNOSIS — E21.0 PRIMARY HYPERPARATHYROIDISM: ICD-10-CM

## 2018-01-08 DIAGNOSIS — R68.89 OTHER GENERAL SYMPTOMS AND SIGNS: ICD-10-CM

## 2018-01-08 DIAGNOSIS — F33.0 MILD EPISODE OF RECURRENT MAJOR DEPRESSIVE DISORDER: ICD-10-CM

## 2018-01-08 DIAGNOSIS — E66.01 MORBID OBESITY DUE TO EXCESS CALORIES: ICD-10-CM

## 2018-01-08 PROBLEM — E87.20 METABOLIC ACIDOSIS: Status: RESOLVED | Noted: 2017-09-21 | Resolved: 2018-01-08

## 2018-01-08 LAB
25(OH)D3+25(OH)D2 SERPL-MCNC: 19 NG/ML
CALCIUM SERPL-MCNC: 9.3 MG/DL
PTH-INTACT SERPL-MCNC: 135 PG/ML
T3 SERPL-MCNC: 63 NG/DL
T4 SERPL-MCNC: 6.4 UG/DL
TSH SERPL DL<=0.005 MIU/L-ACNC: 3.21 UIU/ML

## 2018-01-08 PROCEDURE — 36415 COLL VENOUS BLD VENIPUNCTURE: CPT | Mod: PO

## 2018-01-08 PROCEDURE — 84436 ASSAY OF TOTAL THYROXINE: CPT

## 2018-01-08 PROCEDURE — 99215 OFFICE O/P EST HI 40 MIN: CPT | Mod: PBBFAC,PO | Performed by: FAMILY MEDICINE

## 2018-01-08 PROCEDURE — 82310 ASSAY OF CALCIUM: CPT

## 2018-01-08 PROCEDURE — 87186 SC STD MICRODIL/AGAR DIL: CPT

## 2018-01-08 PROCEDURE — 87088 URINE BACTERIA CULTURE: CPT

## 2018-01-08 PROCEDURE — 99214 OFFICE O/P EST MOD 30 MIN: CPT | Mod: S$PBB,,, | Performed by: FAMILY MEDICINE

## 2018-01-08 PROCEDURE — 84443 ASSAY THYROID STIM HORMONE: CPT

## 2018-01-08 PROCEDURE — 84480 ASSAY TRIIODOTHYRONINE (T3): CPT

## 2018-01-08 PROCEDURE — 87086 URINE CULTURE/COLONY COUNT: CPT

## 2018-01-08 PROCEDURE — 87077 CULTURE AEROBIC IDENTIFY: CPT

## 2018-01-08 PROCEDURE — 99999 PR PBB SHADOW E&M-EST. PATIENT-LVL V: CPT | Mod: PBBFAC,,, | Performed by: FAMILY MEDICINE

## 2018-01-08 PROCEDURE — 83970 ASSAY OF PARATHORMONE: CPT

## 2018-01-08 PROCEDURE — 82306 VITAMIN D 25 HYDROXY: CPT

## 2018-01-08 RX ORDER — TRAZODONE HYDROCHLORIDE 50 MG/1
TABLET ORAL
COMMUNITY
Start: 2017-10-30 | End: 2018-01-23 | Stop reason: SDUPTHER

## 2018-01-08 RX ORDER — LEVOCETIRIZINE DIHYDROCHLORIDE 5 MG/1
5 TABLET, FILM COATED ORAL
COMMUNITY
Start: 2017-11-01 | End: 2018-01-23

## 2018-01-08 RX ORDER — OXYCODONE AND ACETAMINOPHEN 5; 325 MG/1; MG/1
TABLET ORAL
Refills: 0 | COMMUNITY
Start: 2017-11-02 | End: 2018-02-06

## 2018-01-08 RX ORDER — CEPHALEXIN 500 MG/1
CAPSULE ORAL
Refills: 0 | COMMUNITY
Start: 2017-11-02 | End: 2018-01-12

## 2018-01-08 NOTE — PROGRESS NOTES
Subjective:       Patient ID: Luisana Chun is a 72 y.o. female.    Chief Complaint: Sinus Problem (nasal congestion and ear fullness 9/2017)    HPI    Thyroid cancer - pt has an unknown type of thyroid cancer that was diagnosed recently after thyroid biopsy and then subsequent hemithyroidectomy which confirmed this diagnosis. REMY requested from Dr Lennon and Dr Zimmer(sp?).     L sided neck pain - pt has chronic mild neck pain. Pain is intermittent, and occurs 1-2 x per week. Last momentary.     Chronic sinusitis - flonase BID, is s/p abx course, and she still has chronic sinus pressure, PND, and pressure in both ears. She saw Dr. Posey 1 month ago whom added an unknown antihistamine which didn't work much better as an additonal onto her current treatment.               Current Outpatient Prescriptions on File Prior to Visit   Medication Sig Dispense Refill    calcitRIOL (ROCALTROL) 0.5 MCG Cap TAKE ONE CAPSULE BY MOUTH ONCE DAILY 90 capsule 1    calcitRIOL (ROCALTROL) 0.5 MCG Cap TAKE ONE CAPSULE BY MOUTH ONCE DAILY 90 capsule 3    ergocalciferol (ERGOCALCIFEROL) 50,000 unit Cap Take 1 capsule (50,000 Units total) by mouth once daily. 5 capsule 0    estradiol (ESTRACE) 0.5 MG tablet TAKE ONE TABLET BY MOUTH ONCE DAILY 90 tablet 2    FLUoxetine (PROZAC) 40 MG capsule TAKE ONE CAPSULE BY MOUTH IN THE MORNING 90 capsule 2    fluticasone (FLONASE) 50 mcg/actuation nasal spray 1 spray by Each Nare route 2 (two) times daily. 1 Bottle 3    irbesartan (AVAPRO) 300 MG tablet Take 1 tablet (300 mg total) by mouth every evening. 90 tablet 0    ranitidine (ZANTAC) 150 MG capsule Take 1 capsule (150 mg total) by mouth 2 (two) times daily. 60 capsule 5    sodium bicarbonate 325 MG tablet Take 325 mg by mouth 4 (four) times daily.      trazodone (DESYREL) 100 MG tablet Take 1 tablet (100 mg total) by mouth every evening. 90 tablet 1    triamcinolone acetonide 0.1% (KENALOG) 0.1 % cream Apply topically 2 (two)  times daily. 45 g 1    CHONDROITIN SULFATE A (CHONDROITIN SULFATE ORAL) Take by mouth once daily at 6am.       ciprofloxacin HCl (CIPRO) 500 MG tablet Take 1 tablet (500 mg total) by mouth 2 (two) times daily. 20 tablet 0    medroxyPROGESTERone (PROVERA) 2.5 MG tablet TAKE ONE TABLET BY MOUTH ONCE DAILY 90 tablet 2    PROCTO-RAZA 1 % crpe INSERT ONE APPLICATORFUL RECTALLY TWICE DAILY AS NEEDED 28.4 g 30     No current facility-administered medications on file prior to visit.        Past Medical History:   Diagnosis Date    Hypertension     Kidney disease, chronic, stage III (GFR 30-59 ml/min)     Obesity     Osteoporosis        Family History   Problem Relation Age of Onset    Hypertension Mother     Heart disease Mother     Macular degeneration Mother     Hypertension Father     Cataracts Father     Glaucoma Father     Macular degeneration Father     Glaucoma Brother     Macular degeneration Brother     No Known Problems Sister     No Known Problems Maternal Aunt     No Known Problems Maternal Uncle     No Known Problems Paternal Aunt     No Known Problems Paternal Uncle     Macular degeneration Maternal Grandmother     Macular degeneration Maternal Grandfather     Macular degeneration Paternal Grandmother     Glaucoma Paternal Grandfather     Macular degeneration Paternal Grandfather     Amblyopia Neg Hx     Blindness Neg Hx     Cancer Neg Hx     Diabetes Neg Hx     Retinal detachment Neg Hx     Strabismus Neg Hx     Stroke Neg Hx     Thyroid disease Neg Hx         reports that she has quit smoking. She has never used smokeless tobacco. She reports that she drinks alcohol. She reports that she does not use drugs.    Review of Systems   Constitutional: Negative for chills and fever.   HENT: Positive for postnasal drip, rhinorrhea and sinus pain.    Cardiovascular: Negative for chest pain and palpitations.       Objective:     Vitals:    01/08/18 1357   BP: 108/70   Pulse: 75    Resp: 16   Temp: 97.9 °F (36.6 °C)        Physical Exam   Constitutional: She appears well-developed. No distress.   HENT:   Head: Normocephalic and atraumatic.   Eyes: Conjunctivae are normal. No scleral icterus.   Neck:       Pulmonary/Chest: Effort normal.   Neurological: She is alert.   Skin: She is not diaphoretic.   Psychiatric: She has a normal mood and affect. Her behavior is normal.   Vitals reviewed.      Assessment:       1. Primary thyroid cancer    2. Primary hyperparathyroidism    3. Secondary hyperparathyroidism    4. CKD (chronic kidney disease), stage IV    5. Morbid obesity due to excess calories    6. Mild episode of recurrent major depressive disorder    7. Other general symptoms and signs         Plan:       Luisana was seen today for sinus problem.    Diagnoses and all orders for this visit:    THYROID CANCER  Release of information request sent to her surgeon and her endocrinologist that her treating her current thyroid cancer. Patient is unsure which type of cancer she has.    Primary hyperparathyroidism  Patient is status post kieran-thyroidectomy. She will be meeting with her surgeon soon to discuss total thyroidectomy. This may in affect cure her hyperparathyroidism. We will continue to follow her course.  Secondary hyperparathyroidism  Patient is status post kieran-thyroidectomy. She will be meeting with her surgeon soon to discuss total thyroidectomy. This may in affect cure her hyperparathyroidism. We will continue to follow her course.    CKD (chronic kidney disease), stage IV  -     Urine culture  Chronic - REMY requested lab work which was done recently at [facility].   Morbid obesity due to excess calories  Chronic - will discuss further after a plan of action is established for her thyroid cancer.     Mild episode of recurrent major depressive disorder   Pt is doing well on fluoxetine. Her friend does state that she feels a bit more consistent with taking it daily. Patient will try to help  make reminders for her to help her remember to take the medication every day. This is effective when she is taking consistently.            Return in about 2 weeks (around 1/22/2018) for thyroid cancer.        Pt verbalized understanding and agreed with our plan.

## 2018-01-08 NOTE — TELEPHONE ENCOUNTER
----- Message from Waylon Marr sent at 1/8/2018 10:14 AM CST -----  Contact: Luisana [phone]  Pt is calling to see if her medical records has arrived from [facility]. Please give her a call at your earliest convenience.

## 2018-01-08 NOTE — TELEPHONE ENCOUNTER
Pt states trying to see if we received records from thyroid surgery that she had done at [facility] about 6 weeks ago; informed her I do not see any records; gave her fax number so she can fill out release of information at [facility] so they can fax records to office

## 2018-01-09 ENCOUNTER — TELEPHONE (OUTPATIENT)
Dept: ENDOCRINOLOGY | Facility: CLINIC | Age: 73
End: 2018-01-09

## 2018-01-09 PROBLEM — C73 PRIMARY THYROID CANCER: Status: ACTIVE | Noted: 2018-01-09

## 2018-01-09 NOTE — TELEPHONE ENCOUNTER
----- Message from Marta Sims sent at 1/9/2018 10:10 AM CST -----  Contact: Luisana   tel:   [phone]   Pt.says she was not informed about the bone density test, nor  advised where to go for this. Is it locationed in the hospital or the medical building?       Would greatly appreciate the nurse returning her call to discuss this location and has other questions.     Pls call.  As per caller.

## 2018-01-11 ENCOUNTER — TELEPHONE (OUTPATIENT)
Dept: FAMILY MEDICINE | Facility: CLINIC | Age: 73
End: 2018-01-11

## 2018-01-11 LAB — BACTERIA UR CULT: NORMAL

## 2018-01-11 NOTE — TELEPHONE ENCOUNTER
----- Message from Marie Cadena PA-C sent at 1/11/2018 11:27 AM CST -----  No UTI, dont treat unless culture over 100,000 cfu/ml

## 2018-01-11 NOTE — PROGRESS NOTES
"Allergy Clinic Note  [facility]    Subjective:      Patient ID: Luisana Chun is a 72 y.o. female.    Chief Complaint: Allergies      Referring Provider: Self, Aaareferral    History of Present Illness: 72-year-old female presents complaining of an ALLERGIC reaction to fire ant bite/sting.    She reports difficulty with her 2 most recent sting episodes.    During the most recent episode she experienced a few fire ant stings on her feet and ankles she states her whole lower leg was red within 5 minutes.  She further reports that she was "sick" with fatigue and that she was bedridden for 3-4 days.    With Her worst reaction she had more than 50 bites all over her body.  She felt dizzy and off-balance.  Her skin was diffusely red.  Again she had constitutional symptoms lasting a few days.    He states she is done she takes Xyzal or Benadryl or Zyrtec by mouth.  Topically she uses witch hazel, calamine lotion, OTC anti-itch cream, absence salts, and/or hydrocortisone cream.    She has secondary complaints of a variety of long-standing skin lesions.  None are painful or pruritic    Patient has a history of asthma but this was not addressed at this visit      Additional History:  Past medical history is significant for  hypertension and stage IV chronic kidney disease as well as primary thyroid cancer.  She is status post tonsillectomy.Family history is  negative for venom ALLERGY, asthma, or eczema.  Client  reports that she has quit smoking over 40 years ago. She has never used smokeless tobacco.  Exposures are notable for cats, one in the bed.  Denies exposure to  smoke, mold, or unusual substances.  She is retired and lives in [place].      Patient Active Problem List   Diagnosis    Osteoporosis without pathological fracture    Menopausal state    Secondary hyperparathyroidism    Hypertension, uncontrolled    Obesity, Class II, BMI 35-39.9    Vitamin D deficiency    Normocytic anemia    " Rosacea    Bilateral chronic knee pain    Sleep disturbance    KING (obstructive sleep apnea)    CKD (chronic kidney disease), stage IV    Hypertensive CKD (chronic kidney disease)    Primary hyperparathyroidism    Pedal edema    Abnormal urinalysis    Morbid obesity due to excess calories    Mild episode of recurrent major depressive disorder    Primary thyroid cancer     Current Outpatient Prescriptions on File Prior to Visit   Medication Sig Dispense Refill    calcitRIOL (ROCALTROL) 0.5 MCG Cap TAKE ONE CAPSULE BY MOUTH ONCE DAILY 90 capsule 1    calcitRIOL (ROCALTROL) 0.5 MCG Cap TAKE ONE CAPSULE BY MOUTH ONCE DAILY 90 capsule 3    CHONDROITIN SULFATE A (CHONDROITIN SULFATE ORAL) Take by mouth once daily at 6am.       ergocalciferol (ERGOCALCIFEROL) 50,000 unit Cap Take 1 capsule (50,000 Units total) by mouth once daily. 5 capsule 0    estradiol (ESTRACE) 0.5 MG tablet TAKE ONE TABLET BY MOUTH ONCE DAILY 90 tablet 2    FLUoxetine (PROZAC) 40 MG capsule TAKE ONE CAPSULE BY MOUTH IN THE MORNING 90 capsule 2    fluticasone (FLONASE) 50 mcg/actuation nasal spray 1 spray by Each Nare route 2 (two) times daily. 1 Bottle 3    irbesartan (AVAPRO) 300 MG tablet Take 1 tablet (300 mg total) by mouth every evening. 90 tablet 0    levocetirizine (XYZAL) 5 MG tablet Take 5 mg by mouth.       PROCTO-RAZA 1 % crpe INSERT ONE APPLICATORFUL RECTALLY TWICE DAILY AS NEEDED 28.4 g 30    ranitidine (ZANTAC) 150 MG capsule Take 1 capsule (150 mg total) by mouth 2 (two) times daily. 60 capsule 5    sodium bicarbonate 325 MG tablet Take 325 mg by mouth 4 (four) times daily.      trazodone (DESYREL) 100 MG tablet Take 1 tablet (100 mg total) by mouth every evening. 90 tablet 1    traZODone (DESYREL) 50 MG tablet       medroxyPROGESTERone (PROVERA) 2.5 MG tablet TAKE ONE TABLET BY MOUTH ONCE DAILY 90 tablet 2    oxyCODONE-acetaminophen (PERCOCET) 5-325 mg per tablet TK 1 T PO Q 4 H AS NEEDED FOR PAIN  0     "PHENYLEPH/MINERAL OIL/PETROLAT (PREPARATION H RECT) Place rectally once daily.      triamcinolone acetonide 0.1% (KENALOG) 0.1 % cream Apply topically 2 (two) times daily. 45 g 1    [DISCONTINUED] cephALEXin (KEFLEX) 500 MG capsule TK 1 C PO Q 12 H UNTIL GONE  0    [DISCONTINUED] ciprofloxacin HCl (CIPRO) 500 MG tablet Take 1 tablet (500 mg total) by mouth 2 (two) times daily. 20 tablet 0     No current facility-administered medications on file prior to visit.          Review of Systems   Constitutional: Negative for chills, fever and malaise/fatigue.   HENT: Negative for ear discharge.    Eyes: Negative for pain and discharge.   Respiratory: Negative for hemoptysis and stridor.    Cardiovascular: Negative for chest pain and palpitations.   Gastrointestinal: Negative for abdominal pain, blood in stool, nausea and vomiting.   Genitourinary: Negative for dysuria, flank pain and hematuria.   Skin: Positive for rash. Negative for itching.   Neurological: Negative for seizures and loss of consciousness.       Objective:   Pulse 77   Ht 5' 9" (1.753 m)   Wt 119 kg (262 lb 5.6 oz)   SpO2 98%   BMI 38.74 kg/m²     Physical Exam   Constitutional: She is well-developed, well-nourished, and in no distress. No distress.   HENT:   Right Ear: Tympanic membrane normal.   Left Ear: Tympanic membrane normal.   Nose: No nasal deformity. No epistaxis.  No foreign bodies.   Mouth/Throat: No uvula swelling. No oropharyngeal exudate or tonsillar abscesses.   Eyes: Conjunctivae are normal. Right eye exhibits no discharge. Left eye exhibits no discharge.   Cardiovascular: Normal heart sounds.    Pulmonary/Chest: Breath sounds normal. No stridor. She has no wheezes. She has no rales.   Abdominal: Soft. There is no tenderness.   Musculoskeletal: She exhibits no edema.   Lymphadenopathy:     She has no cervical adenopathy.   Neurological: She is alert. Gait normal.   Skin: Skin is warm and dry.   No acute lesions.  On her cheek she has " a 0.5 cm lentigo.  On her back she has a few early seborrheic keratoses and several in capillaries.  On her left ear she has a dry,rough spot which could be an early actinic keratosis   Psychiatric: Affect normal.       Data:  Normal TSH (1/8/2018)  Eosinophil count 300 from October 2017 CBC    Assessment:     1. Insect stings, accidental or unintentional, sequela    2. Rash    3. Fire ant bite, accidental or unintentional, sequela        Plan:     Luisana was seen today for allergies.    Diagnoses and all orders for this visit:    Discussion: Highly unusual presentation with erythema and constitutional symptoms following fire ant stings.  Prolonged symptoms are not consistent with a toxic reaction.  Erythema could be a contact reaction to the unknown over-the-counter cream she applies however, one more headaches back a delay to onset of symptoms         Insect stings, accidental or unintentional, sequela  Rash  Fire ant bite, accidental or unintentional, sequela  -     Fire ant (Solenopsis invecta) IgE; Future    -     hydrocortisone 2.5 % cream; Apply topically 2 (two) times daily. To sting areas and red areas      Semb K and other miscellaneous skin lesions  Reassurance    Possible AK CL ear  Apt made at dermatology (Dr Davey Chaudhary, 1/23)      Patient Instructions   Blood work for fire ant allergy testing today   Call [phone] in a week for results.      If test is positive, will recommend a revisit to get an emergency kit, skin testing, and to discuss pros/cons of desensitization.        Follow-up if symptoms worsen or fail to improve.    Holley Carrington MD

## 2018-01-12 ENCOUNTER — OFFICE VISIT (OUTPATIENT)
Dept: ALLERGY | Facility: CLINIC | Age: 73
End: 2018-01-12
Payer: MEDICARE

## 2018-01-12 ENCOUNTER — LAB VISIT (OUTPATIENT)
Dept: LAB | Facility: HOSPITAL | Age: 73
End: 2018-01-12
Attending: STUDENT IN AN ORGANIZED HEALTH CARE EDUCATION/TRAINING PROGRAM
Payer: MEDICARE

## 2018-01-12 VITALS — BODY MASS INDEX: 38.86 KG/M2 | WEIGHT: 262.38 LBS | HEART RATE: 77 BPM | HEIGHT: 69 IN | OXYGEN SATURATION: 98 %

## 2018-01-12 DIAGNOSIS — T63.421S FIRE ANT BITE, ACCIDENTAL OR UNINTENTIONAL, SEQUELA: ICD-10-CM

## 2018-01-12 DIAGNOSIS — R21 RASH: ICD-10-CM

## 2018-01-12 DIAGNOSIS — T63.481S INSECT STINGS, ACCIDENTAL OR UNINTENTIONAL, SEQUELA: Primary | ICD-10-CM

## 2018-01-12 PROCEDURE — 86003 ALLG SPEC IGE CRUDE XTRC EA: CPT

## 2018-01-12 PROCEDURE — 99213 OFFICE O/P EST LOW 20 MIN: CPT | Mod: PBBFAC,PO | Performed by: STUDENT IN AN ORGANIZED HEALTH CARE EDUCATION/TRAINING PROGRAM

## 2018-01-12 PROCEDURE — 99204 OFFICE O/P NEW MOD 45 MIN: CPT | Mod: S$PBB,,, | Performed by: STUDENT IN AN ORGANIZED HEALTH CARE EDUCATION/TRAINING PROGRAM

## 2018-01-12 PROCEDURE — 99999 PR PBB SHADOW E&M-EST. PATIENT-LVL III: CPT | Mod: PBBFAC,,, | Performed by: STUDENT IN AN ORGANIZED HEALTH CARE EDUCATION/TRAINING PROGRAM

## 2018-01-12 PROCEDURE — 36415 COLL VENOUS BLD VENIPUNCTURE: CPT | Mod: PO

## 2018-01-12 RX ORDER — LEVOTHYROXINE SODIUM 25 UG/1
25 TABLET ORAL DAILY
COMMUNITY
End: 2018-03-16 | Stop reason: DRUGHIGH

## 2018-01-12 RX ORDER — HYDROCORTISONE 25 MG/G
CREAM TOPICAL 2 TIMES DAILY
Qty: 56 G | Refills: 2 | Status: SHIPPED | OUTPATIENT
Start: 2018-01-12 | End: 2018-01-23

## 2018-01-12 NOTE — PATIENT INSTRUCTIONS
Blood work for fire ant allergy testing today   Call [phone] in a week for results.      If test is positive, will recommend a revisit to get an emergency kit, skin testing, and to discuss pros/cons of desensitization.

## 2018-01-15 LAB
DEPRECATED RED IMP FIRE ANT IGE RAST QL: ABNORMAL
RED IMP FIRE ANT IGE QN: 12 KU/L

## 2018-01-23 ENCOUNTER — OFFICE VISIT (OUTPATIENT)
Dept: FAMILY MEDICINE | Facility: CLINIC | Age: 73
End: 2018-01-23
Payer: MEDICARE

## 2018-01-23 VITALS
HEIGHT: 69 IN | SYSTOLIC BLOOD PRESSURE: 144 MMHG | RESPIRATION RATE: 16 BRPM | DIASTOLIC BLOOD PRESSURE: 76 MMHG | TEMPERATURE: 98 F | WEIGHT: 261.25 LBS | BODY MASS INDEX: 38.69 KG/M2 | HEART RATE: 82 BPM | OXYGEN SATURATION: 96 %

## 2018-01-23 DIAGNOSIS — K64.4 EXTERNAL HEMORRHOIDS WITHOUT COMPLICATION: Primary | ICD-10-CM

## 2018-01-23 DIAGNOSIS — R19.5 STOOL COLOR ABNORMAL: ICD-10-CM

## 2018-01-23 DIAGNOSIS — E16.2 HYPOGLYCEMIA: ICD-10-CM

## 2018-01-23 DIAGNOSIS — E55.9 HYPOVITAMINOSIS D: ICD-10-CM

## 2018-01-23 DIAGNOSIS — I10 HYPERTENSION, UNCONTROLLED: ICD-10-CM

## 2018-01-23 DIAGNOSIS — E66.01 MORBID OBESITY DUE TO EXCESS CALORIES: ICD-10-CM

## 2018-01-23 DIAGNOSIS — J30.89 CHRONIC NON-SEASONAL ALLERGIC RHINITIS, UNSPECIFIED TRIGGER: ICD-10-CM

## 2018-01-23 PROCEDURE — 99999 PR PBB SHADOW E&M-EST. PATIENT-LVL III: CPT | Mod: PBBFAC,,, | Performed by: FAMILY MEDICINE

## 2018-01-23 PROCEDURE — 99214 OFFICE O/P EST MOD 30 MIN: CPT | Mod: S$PBB,,, | Performed by: FAMILY MEDICINE

## 2018-01-23 PROCEDURE — 99213 OFFICE O/P EST LOW 20 MIN: CPT | Mod: PBBFAC,PO | Performed by: FAMILY MEDICINE

## 2018-01-23 RX ORDER — AMLODIPINE BESYLATE 2.5 MG/1
2.5 TABLET ORAL DAILY
COMMUNITY
Start: 2018-01-15 | End: 2018-02-06

## 2018-01-23 RX ORDER — HYDROCORTISONE 10 MG/G
CREAM TOPICAL
Qty: 28.4 G | Refills: 8 | Status: SHIPPED | OUTPATIENT
Start: 2018-01-23 | End: 2018-05-10

## 2018-01-23 RX ORDER — FLUTICASONE PROPIONATE 50 MCG
1 SPRAY, SUSPENSION (ML) NASAL 2 TIMES DAILY
Qty: 1 BOTTLE | Refills: 11 | Status: SHIPPED | OUTPATIENT
Start: 2018-01-23 | End: 2018-05-10

## 2018-01-23 NOTE — PROGRESS NOTES
Subjective:       Patient ID: Luisana Chun is a 72 y.o. female.    Chief Complaint: Results (on recent labs ); Stool Color Change; and Sinus Problem (requesting medication to help with sinus congestion  )    HPI    Vitamin D deficiency - Pt is adherent with calcitriol and ergocalciferol daily. She recently was asked to increase ergocalciferol by her nephrologist.     Grey stools - intermittent x years, but it has increased in frequency over the last month.     Htn - pt was recently placed on amlodipine for her htn. Pt is adherent with this (unkown dose), but does have some minimal ankle swelling since starting this.     Ext hemorrhoids - patient has intermittent issues with external hemorrhoids that are itchy and painful. She is on her Proctofoam is requesting a refill today. No recent issues with blood in her stool or blood with wiping.      Current Outpatient Prescriptions on File Prior to Visit   Medication Sig Dispense Refill    calcitRIOL (ROCALTROL) 0.5 MCG Cap TAKE ONE CAPSULE BY MOUTH ONCE DAILY 90 capsule 1    ergocalciferol (ERGOCALCIFEROL) 50,000 unit Cap Take 1 capsule (50,000 Units total) by mouth once daily. 5 capsule 0    FLUoxetine (PROZAC) 40 MG capsule TAKE ONE CAPSULE BY MOUTH IN THE MORNING 90 capsule 2    hydrocortisone 2.5 % cream Apply topically 2 (two) times daily. To sting areas and red areas 56 g 2    irbesartan (AVAPRO) 300 MG tablet Take 1 tablet (300 mg total) by mouth every evening. 90 tablet 0    levothyroxine (SYNTHROID) 25 MCG tablet Take 25 mcg by mouth once daily.      oxyCODONE-acetaminophen (PERCOCET) 5-325 mg per tablet TK 1 T PO Q 4 H AS NEEDED FOR PAIN  0    PHENYLEPH/MINERAL OIL/PETROLAT (PREPARATION H RECT) Place rectally once daily.      sodium bicarbonate 325 MG tablet Take 325 mg by mouth 4 (four) times daily.      trazodone (DESYREL) 100 MG tablet Take 1 tablet (100 mg total) by mouth every evening. 90 tablet 1    [DISCONTINUED] calcitRIOL (ROCALTROL)  0.5 MCG Cap TAKE ONE CAPSULE BY MOUTH ONCE DAILY 90 capsule 3    [DISCONTINUED] CHONDROITIN SULFATE A (CHONDROITIN SULFATE ORAL) Take by mouth once daily at 6am.       [DISCONTINUED] fluticasone (FLONASE) 50 mcg/actuation nasal spray 1 spray by Each Nare route 2 (two) times daily. 1 Bottle 3    [DISCONTINUED] ranitidine (ZANTAC) 150 MG capsule Take 1 capsule (150 mg total) by mouth 2 (two) times daily. 60 capsule 5    [DISCONTINUED] traZODone (DESYREL) 50 MG tablet       estradiol (ESTRACE) 0.5 MG tablet TAKE ONE TABLET BY MOUTH ONCE DAILY 90 tablet 2    [DISCONTINUED] levocetirizine (XYZAL) 5 MG tablet Take 5 mg by mouth.       [DISCONTINUED] medroxyPROGESTERone (PROVERA) 2.5 MG tablet TAKE ONE TABLET BY MOUTH ONCE DAILY 90 tablet 2    [DISCONTINUED] PROCTO-RAZA 1 % crpe INSERT ONE APPLICATORFUL RECTALLY TWICE DAILY AS NEEDED 28.4 g 30    [DISCONTINUED] triamcinolone acetonide 0.1% (KENALOG) 0.1 % cream Apply topically 2 (two) times daily. 45 g 1     No current facility-administered medications on file prior to visit.        Past Medical History:   Diagnosis Date    Hypertension     Kidney disease, chronic, stage III (GFR 30-59 ml/min)     Obesity     Osteoporosis        Family History   Problem Relation Age of Onset    Hypertension Mother     Heart disease Mother     Macular degeneration Mother     Hypertension Father     Cataracts Father     Glaucoma Father     Macular degeneration Father     Glaucoma Brother     Macular degeneration Brother     No Known Problems Sister     No Known Problems Maternal Aunt     No Known Problems Maternal Uncle     No Known Problems Paternal Aunt     No Known Problems Paternal Uncle     Macular degeneration Maternal Grandmother     Macular degeneration Maternal Grandfather     Macular degeneration Paternal Grandmother     Glaucoma Paternal Grandfather     Macular degeneration Paternal Grandfather     Amblyopia Neg Hx     Blindness Neg Hx     Cancer  Neg Hx     Diabetes Neg Hx     Retinal detachment Neg Hx     Strabismus Neg Hx     Stroke Neg Hx     Thyroid disease Neg Hx         reports that she has quit smoking. She has never used smokeless tobacco. She reports that she drinks alcohol. She reports that she does not use drugs.    Review of Systems   Constitutional: Negative for chills and fever.   Cardiovascular: Negative for chest pain and palpitations.       Objective:     Vitals:    01/23/18 1510   BP: (!) 144/76   Pulse: 82   Resp: 16   Temp: 98.3 °F (36.8 °C)        Physical Exam   Constitutional: She appears well-developed. No distress.   HENT:   Head: Normocephalic and atraumatic.   Eyes: Conjunctivae are normal. No scleral icterus.   Pulmonary/Chest: Effort normal.   Neurological: She is alert.   Skin: She is not diaphoretic.   Psychiatric: She has a normal mood and affect. Her behavior is normal.   Vitals reviewed.      Assessment:       1. External hemorrhoids without complication    2. Chronic non-seasonal allergic rhinitis, unspecified trigger    3. Hypovitaminosis D    4. Hypertension, uncontrolled    5. Stool color abnormal    6. Morbid obesity due to excess calories    7. Hypoglycemia        Plan:       Luisana was seen today for results, stool color change and sinus problem.    Diagnoses and all orders for this visit:    External hemorrhoids without complication  -     hydrocortisone (PROCTO-RAZA) 1 % crpe; INSERT ONE APPLICATORFUL RECTALLY TWICE DAILY AS NEEDED  - Chronic - stable     Pt is doing well on current therapy and is requesting a refill. No side effects noted. Will continue current therapy.     Chronic non-seasonal allergic rhinitis, unspecified trigger  -     fluticasone (FLONASE) 50 mcg/actuation nasal spray; 1 spray (50 mcg total) by Each Nare route 2 (two) times daily.  - Chronic - stable     Pt is doing well on current therapy and is requesting a refill. No side effects noted. Will continue current therapy.        Hypovitaminosis D  -     Comprehensive metabolic panel; Future  Patient recently had her ergocalciferol increased. We will continue his current doses with her 50,000 units of vitamin D 2. Currently followed by nephrology.    Hypertension, uncontrolled  Pts blood pressure is uncontrolled.          Pt asked to keep BP log with date/BP, taking BP at least 4 x a week. They will bring this with them to their next appointment.     Pt asked to call me if BPs consistently above 140/90.    Pts questions were answered.    The 10-year CVD risk score (HUDSON'Agostino, et al., 2008) is: 20.3%    Values used to calculate the score:      Age: 72 years      Sex: Female      Diabetic: No      Tobacco smoker: No      Systolic Blood Pressure: 144 mmHg      Is BP treated: Yes      HDL Cholesterol: 31 mg/dL      Total Cholesterol: 159 mg/dL      Stool color abnormal  We'll check patient's kidney liver function. Since patient's symptoms have been long-standing will not pursue additional testing at this time.    Morbid obesity due to excess calories  Chronic stable. She advised reducing sugar drinks today.    Hypoglycemia  -     Hemoglobin A1c; Future            Follow-up in about 2 months (around 3/23/2018) for ckd, htn.        Pt verbalized understanding and agreed with our plan.

## 2018-01-30 ENCOUNTER — TELEPHONE (OUTPATIENT)
Dept: FAMILY MEDICINE | Facility: CLINIC | Age: 73
End: 2018-01-30

## 2018-01-30 RX ORDER — TRIAMCINOLONE ACETONIDE 1 MG/G
CREAM TOPICAL 2 TIMES DAILY
Qty: 45 G | Status: CANCELLED | OUTPATIENT
Start: 2018-01-30 | End: 2018-02-09

## 2018-01-30 NOTE — TELEPHONE ENCOUNTER
----- Message from Julia Gupta sent at 1/30/2018  9:53 AM CST -----  Contact: self [phone]  Pt is calling requesting test results please advise thanks

## 2018-01-30 NOTE — TELEPHONE ENCOUNTER
Attempt to contact patient to clarify that at last visit medication was discontinued due to patient no longer using. Also to notify that orders have been sent to [facility]. No answer. Left message to return call to clinic.

## 2018-01-30 NOTE — TELEPHONE ENCOUNTER
Spoke to patient. States she doesn't think she realized which cream they were asking about at last visit. Would like to get a refill on Kenalog. She still uses. Notified patient of request being sent to provider and  lab orders were sent to [facility]. Verbalized understanding.  LOV 1/23/2018  Last script 9/15/2017

## 2018-01-30 NOTE — TELEPHONE ENCOUNTER
----- Message from Janet Chung sent at 1/30/2018  9:48 AM CST -----  Contact: SELF  REFILL: triamcinolone acetonide 0.1% (KENALOG) 0.1 % cream     [facility]END [facility]    Also, patient is requesting lab orders be faxed to [facility] in [place] by RTISHA.    Please contact her  At [phone].

## 2018-01-30 NOTE — TELEPHONE ENCOUNTER
----- Message from Waylon Marr sent at 1/30/2018 10:46 AM CST -----  Contact: Luisana [phone]  Pt is requesting a call back. Please call at your earliest convenience.

## 2018-01-31 ENCOUNTER — LAB VISIT (OUTPATIENT)
Dept: LAB | Facility: HOSPITAL | Age: 73
End: 2018-01-31
Attending: FAMILY MEDICINE
Payer: MEDICARE

## 2018-01-31 ENCOUNTER — TELEPHONE (OUTPATIENT)
Dept: FAMILY MEDICINE | Facility: CLINIC | Age: 73
End: 2018-01-31

## 2018-01-31 DIAGNOSIS — R73.9 HYPERGLYCEMIA: ICD-10-CM

## 2018-01-31 DIAGNOSIS — E55.9 HYPOVITAMINOSIS D: Primary | ICD-10-CM

## 2018-01-31 DIAGNOSIS — E55.9 HYPOVITAMINOSIS D: ICD-10-CM

## 2018-01-31 DIAGNOSIS — N18.4 CKD (CHRONIC KIDNEY DISEASE), STAGE IV: ICD-10-CM

## 2018-01-31 LAB
ALBUMIN SERPL BCP-MCNC: 3.7 G/DL
ALP SERPL-CCNC: 129 U/L
ALT SERPL W/O P-5'-P-CCNC: 20 U/L
ANION GAP SERPL CALC-SCNC: 10 MMOL/L
AST SERPL-CCNC: 19 U/L
BILIRUB SERPL-MCNC: 0.4 MG/DL
BUN SERPL-MCNC: 23 MG/DL
CALCIUM SERPL-MCNC: 8.9 MG/DL
CHLORIDE SERPL-SCNC: 112 MMOL/L
CO2 SERPL-SCNC: 19 MMOL/L
CREAT SERPL-MCNC: 1.4 MG/DL
EST. GFR  (AFRICAN AMERICAN): 43.3 ML/MIN/1.73 M^2
EST. GFR  (NON AFRICAN AMERICAN): 37.6 ML/MIN/1.73 M^2
ESTIMATED AVG GLUCOSE: 85 MG/DL
GLUCOSE SERPL-MCNC: 81 MG/DL
HBA1C MFR BLD HPLC: 4.6 %
POTASSIUM SERPL-SCNC: 4.1 MMOL/L
PROT SERPL-MCNC: 7.4 G/DL
SODIUM SERPL-SCNC: 141 MMOL/L

## 2018-01-31 PROCEDURE — 80053 COMPREHEN METABOLIC PANEL: CPT

## 2018-01-31 PROCEDURE — 83036 HEMOGLOBIN GLYCOSYLATED A1C: CPT

## 2018-01-31 PROCEDURE — 36415 COLL VENOUS BLD VENIPUNCTURE: CPT | Mod: PO

## 2018-02-06 ENCOUNTER — OFFICE VISIT (OUTPATIENT)
Dept: FAMILY MEDICINE | Facility: CLINIC | Age: 73
End: 2018-02-06
Payer: MEDICARE

## 2018-02-06 VITALS
RESPIRATION RATE: 16 BRPM | HEART RATE: 75 BPM | TEMPERATURE: 99 F | BODY MASS INDEX: 39.7 KG/M2 | DIASTOLIC BLOOD PRESSURE: 96 MMHG | HEIGHT: 69 IN | OXYGEN SATURATION: 97 % | WEIGHT: 268.06 LBS | SYSTOLIC BLOOD PRESSURE: 156 MMHG

## 2018-02-06 DIAGNOSIS — N18.4 CKD (CHRONIC KIDNEY DISEASE), STAGE IV: ICD-10-CM

## 2018-02-06 DIAGNOSIS — R80.1 PERSISTENT PROTEINURIA: ICD-10-CM

## 2018-02-06 DIAGNOSIS — I10 HYPERTENSION, UNCONTROLLED: Primary | ICD-10-CM

## 2018-02-06 PROCEDURE — 1125F AMNT PAIN NOTED PAIN PRSNT: CPT | Mod: ,,, | Performed by: FAMILY MEDICINE

## 2018-02-06 PROCEDURE — 99214 OFFICE O/P EST MOD 30 MIN: CPT | Mod: S$PBB,,, | Performed by: FAMILY MEDICINE

## 2018-02-06 PROCEDURE — 1159F MED LIST DOCD IN RCRD: CPT | Mod: ,,, | Performed by: FAMILY MEDICINE

## 2018-02-06 PROCEDURE — 99213 OFFICE O/P EST LOW 20 MIN: CPT | Mod: PBBFAC,PO | Performed by: FAMILY MEDICINE

## 2018-02-06 PROCEDURE — 99999 PR PBB SHADOW E&M-EST. PATIENT-LVL III: CPT | Mod: PBBFAC,,, | Performed by: FAMILY MEDICINE

## 2018-02-06 RX ORDER — FLUOCINONIDE 0.5 MG/G
OINTMENT TOPICAL DAILY
COMMUNITY
Start: 2018-01-31 | End: 2018-05-10

## 2018-02-06 RX ORDER — MEDROXYPROGESTERONE ACETATE 2.5 MG/1
TABLET ORAL
COMMUNITY
Start: 2018-01-13 | End: 2018-02-06

## 2018-02-06 RX ORDER — HYDROCORTISONE 25 MG/G
CREAM TOPICAL DAILY
COMMUNITY
Start: 2018-01-31 | End: 2018-05-10

## 2018-02-06 RX ORDER — NIFEDIPINE 30 MG/1
30 TABLET, EXTENDED RELEASE ORAL DAILY
Qty: 30 TABLET | Refills: 11 | Status: SHIPPED | OUTPATIENT
Start: 2018-02-06 | End: 2019-02-06

## 2018-02-06 RX ORDER — CEPHALEXIN 250 MG/1
250 CAPSULE ORAL
COMMUNITY
Start: 2017-10-30 | End: 2018-02-06

## 2018-02-06 NOTE — PROGRESS NOTES
Subjective:       Patient ID: Luisana Chun is a 72 y.o. female.    Chief Complaint: Results (follow up recent labs)    HPI    Htn, uncontrolled -  Pts Bp remains elevated today. She has had a difficult time with bp control due to numerous side effects. She was most recently placed on amlodipine and she has ankle swelling.               Proteinuria - reviewed her labs - this has recurred now for the second time in the last year per my record review. Pt is aware of the importance of BP control. Reviewed A1c of 4.6.     Current Outpatient Prescriptions on File Prior to Visit   Medication Sig Dispense Refill    calcitRIOL (ROCALTROL) 0.5 MCG Cap TAKE ONE CAPSULE BY MOUTH ONCE DAILY 90 capsule 1    ergocalciferol (ERGOCALCIFEROL) 50,000 unit Cap Take 1 capsule (50,000 Units total) by mouth once daily. 5 capsule 0    FLUoxetine (PROZAC) 40 MG capsule TAKE ONE CAPSULE BY MOUTH IN THE MORNING 90 capsule 2    fluticasone (FLONASE) 50 mcg/actuation nasal spray 1 spray (50 mcg total) by Each Nare route 2 (two) times daily. 1 Bottle 11    hydrocortisone (PROCTO-RAZA) 1 % crpe INSERT ONE APPLICATORFUL RECTALLY TWICE DAILY AS NEEDED 28.4 g 8    irbesartan (AVAPRO) 300 MG tablet Take 1 tablet (300 mg total) by mouth every evening. 90 tablet 0    levothyroxine (SYNTHROID) 25 MCG tablet Take 25 mcg by mouth once daily.      PHENYLEPH/MINERAL OIL/PETROLAT (PREPARATION H RECT) Place rectally once daily.      sodium bicarbonate 325 MG tablet Take 325 mg by mouth 4 (four) times daily.      trazodone (DESYREL) 100 MG tablet Take 1 tablet (100 mg total) by mouth every evening. 90 tablet 1    [DISCONTINUED] amLODIPine (NORVASC) 2.5 MG tablet Take 2.5 mg by mouth once daily.       [DISCONTINUED] oxyCODONE-acetaminophen (PERCOCET) 5-325 mg per tablet TK 1 T PO Q 4 H AS NEEDED FOR PAIN  0    estradiol (ESTRACE) 0.5 MG tablet TAKE ONE TABLET BY MOUTH ONCE DAILY 90 tablet 2     No current facility-administered medications on  file prior to visit.        Past Medical History:   Diagnosis Date    Hypertension     Kidney disease, chronic, stage III (GFR 30-59 ml/min)     Obesity     Osteoporosis        Family History   Problem Relation Age of Onset    Hypertension Mother     Heart disease Mother     Macular degeneration Mother     Hypertension Father     Cataracts Father     Glaucoma Father     Macular degeneration Father     Glaucoma Brother     Macular degeneration Brother     No Known Problems Sister     No Known Problems Maternal Aunt     No Known Problems Maternal Uncle     No Known Problems Paternal Aunt     No Known Problems Paternal Uncle     Macular degeneration Maternal Grandmother     Macular degeneration Maternal Grandfather     Macular degeneration Paternal Grandmother     Glaucoma Paternal Grandfather     Macular degeneration Paternal Grandfather     Amblyopia Neg Hx     Blindness Neg Hx     Cancer Neg Hx     Diabetes Neg Hx     Retinal detachment Neg Hx     Strabismus Neg Hx     Stroke Neg Hx     Thyroid disease Neg Hx         reports that she has quit smoking. She has never used smokeless tobacco. She reports that she drinks alcohol. She reports that she does not use drugs.    Review of Systems   Constitutional: Negative for fever.   Respiratory: Negative for shortness of breath and wheezing.    Cardiovascular: Negative for chest pain and palpitations.       Objective:     Vitals:    02/06/18 1604   BP: (!) 156/96   Pulse: 75   Resp: 16   Temp: 98.5 °F (36.9 °C)        Physical Exam   Constitutional: She appears well-developed. No distress.   HENT:   Head: Normocephalic and atraumatic.   Eyes: Conjunctivae are normal. No scleral icterus.   Pulmonary/Chest: Effort normal.   Neurological: She is alert.   Skin: She is not diaphoretic.   Psychiatric: She has a normal mood and affect. Her behavior is normal.   Vitals reviewed.      Assessment:       1. Hypertension, uncontrolled    2. CKD (chronic  kidney disease), stage IV    3. Persistent proteinuria        Plan:       Luisana was seen today for results.    Diagnoses and all orders for this visit:    Hypertension, uncontrolled  -     NIFEdipine (PROCARDIA-XL) 30 MG (OSM) 24 hr tablet; Take 1 tablet (30 mg total) by mouth once daily.  Pts blood pressure is uncontrolled. I advised the following lifestyle changes:  - exercise for 20 mins x 3-5 a week per AHA recommendations  - weight reduction if overweight by calorie restriction  - increase consumption of fruit/vegetables to 5 or more servings a day        - reduce sodium intake    At this stage, we discussed that their risk for MI and CVA is too high with their current BP, and will add nifedpine 30mg.          Pt asked to keep BP log with date/BP, taking BP at least 4 x a week. They will bring this with them to their next appointment.     Pt asked to call me if BPs consistently above 140/90.    Pts questions were answered.    The 10-year CVD risk score (D'Agostino, et al., 2008) is: 24.8%    Values used to calculate the score:      Age: 72 years      Sex: Female      Diabetic: No      Tobacco smoker: No      Systolic Blood Pressure: 156 mmHg      Is BP treated: Yes      HDL Cholesterol: 31 mg/dL      Total Cholesterol: 159 mg/dL    CKD (chronic kidney disease), stage IV  This is mostly driven by [facility] htn. Goal #1, control BP on meds that she can take.       Persistent proteinuria  Control bp as above, continue ARB, follow with neprho.         Follow-up in about 4 weeks (around 3/6/2018) for Hypertension.        Pt verbalized understanding and agreed with our plan.

## 2018-02-06 NOTE — PROGRESS NOTES
OverDue Health Maintenance    Zoster Vaccine Done - unable to find right pharmacy pt had vaccine done at

## 2018-02-07 DIAGNOSIS — I10 BENIGN ESSENTIAL HYPERTENSION: ICD-10-CM

## 2018-02-07 DIAGNOSIS — N18.30 CKD (CHRONIC KIDNEY DISEASE), STAGE III: ICD-10-CM

## 2018-02-07 RX ORDER — IRBESARTAN 300 MG/1
TABLET ORAL
Qty: 90 TABLET | Refills: 3 | Status: SHIPPED | OUTPATIENT
Start: 2018-02-07

## 2018-02-16 ENCOUNTER — HOSPITAL ENCOUNTER (OUTPATIENT)
Dept: RADIOLOGY | Facility: HOSPITAL | Age: 73
Discharge: HOME | End: 2018-02-16
Attending: PHYSICIAN ASSISTANT
Payer: MEDICARE

## 2018-02-16 ENCOUNTER — OFFICE VISIT (OUTPATIENT)
Dept: FAMILY MEDICINE | Facility: CLINIC | Age: 73
End: 2018-02-16
Payer: MEDICARE

## 2018-02-16 VITALS
RESPIRATION RATE: 16 BRPM | OXYGEN SATURATION: 95 % | TEMPERATURE: 99 F | SYSTOLIC BLOOD PRESSURE: 142 MMHG | HEIGHT: 69 IN | HEART RATE: 92 BPM | WEIGHT: 265 LBS | DIASTOLIC BLOOD PRESSURE: 84 MMHG | BODY MASS INDEX: 39.25 KG/M2

## 2018-02-16 DIAGNOSIS — N39.46 MIXED URGE AND STRESS INCONTINENCE: ICD-10-CM

## 2018-02-16 DIAGNOSIS — F33.0 MILD EPISODE OF RECURRENT MAJOR DEPRESSIVE DISORDER: Primary | ICD-10-CM

## 2018-02-16 DIAGNOSIS — M79.645 PAIN OF LEFT THUMB: ICD-10-CM

## 2018-02-16 DIAGNOSIS — F32.1 MAJOR DEPRESSIVE DISORDER, SINGLE EPISODE, MODERATE: ICD-10-CM

## 2018-02-16 PROCEDURE — 1125F AMNT PAIN NOTED PAIN PRSNT: CPT | Mod: ,,, | Performed by: PHYSICIAN ASSISTANT

## 2018-02-16 PROCEDURE — 99999 PR PBB SHADOW E&M-EST. PATIENT-LVL V: CPT | Mod: PBBFAC,,, | Performed by: PHYSICIAN ASSISTANT

## 2018-02-16 PROCEDURE — 1159F MED LIST DOCD IN RCRD: CPT | Mod: ,,, | Performed by: PHYSICIAN ASSISTANT

## 2018-02-16 PROCEDURE — 99215 OFFICE O/P EST HI 40 MIN: CPT | Mod: PBBFAC,25,PO | Performed by: PHYSICIAN ASSISTANT

## 2018-02-16 PROCEDURE — 73130 X-RAY EXAM OF HAND: CPT | Mod: TC,FY,PO,LT

## 2018-02-16 PROCEDURE — 73130 X-RAY EXAM OF HAND: CPT | Mod: 26,LT,, | Performed by: RADIOLOGY

## 2018-02-16 PROCEDURE — 99214 OFFICE O/P EST MOD 30 MIN: CPT | Mod: S$PBB,,, | Performed by: PHYSICIAN ASSISTANT

## 2018-02-16 RX ORDER — FLUOXETINE HYDROCHLORIDE 40 MG/1
80 CAPSULE ORAL DAILY
Qty: 180 CAPSULE | Refills: 0 | Status: SHIPPED | OUTPATIENT
Start: 2018-02-16 | End: 2018-10-09 | Stop reason: SDUPTHER

## 2018-02-16 RX ORDER — AMLODIPINE BESYLATE 2.5 MG/1
2.5 TABLET ORAL DAILY
COMMUNITY
Start: 2018-02-09 | End: 2018-03-16

## 2018-02-16 NOTE — PROGRESS NOTES
Subjective:       Patient ID: Luisana Chun is a 72 y.o. female.    Chief Complaint: Hand Pain (left thumb pain) and Medication Management (discuss anti deression meds)    HPI: 71 yo female presents for thumb pain and depression. Injured left thumb at parade trying to catch a basketball 3 days ago. [place] it crack. Made home splint. Taking aspirin for pain with relief. Depression was doing well on prozac. Recently found out she has skin and thyroid cancer and has been having a hard time dealing with it. Denies suicidal thoughts.   Social History     Social History    Marital status:      Spouse name: N/A    Number of children: N/A    Years of education: N/A     Occupational History    Not on file.     Social History Main Topics    Smoking status: Former Smoker    Smokeless tobacco: Never Used    Alcohol use 0.0 oz/week      Comment: social    Drug use: No    Sexual activity: Not Currently     Partners: Male      Comment: 5/4/17 single     Other Topics Concern    Not on file     Social History Narrative    No narrative on file       Review of Systems   Constitutional: Negative for fever.   Musculoskeletal: Positive for arthralgias and joint swelling.   Psychiatric/Behavioral: Positive for dysphoric mood. Negative for sleep disturbance.       Objective:      Physical Exam   Constitutional: She is oriented to person, place, and time. She appears well-developed and well-nourished. No distress.   HENT:   Head: Normocephalic and atraumatic.   Musculoskeletal:        Hands:  Neurological: She is alert and oriented to person, place, and time.   Skin: Skin is warm and dry. She is not diaphoretic.   Psychiatric: She has a normal mood and affect. Her speech is normal and behavior is normal. Cognition and memory are normal.   Vitals reviewed.      Assessment:       1. Pain of left thumb        Plan:         Luisana was seen today for hand pain and medication management.    Diagnoses and all orders for this  visit:    Mild episode of recurrent major depressive disorder  -   Increase dose of prozac to 80mg, f/u with PCP    Pain of left thumb  -     X-Ray Hand 3 view Left; Future  -     Reviewed by me, no fracture    Major depressive disorder, single episode, moderate  -     FLUoxetine (PROZAC) 40 MG capsule; Take 2 capsules (80 mg total) by mouth once daily.    Mixed urge and stress incontinence  -     Ambulatory referral to Urology

## 2018-02-28 ENCOUNTER — TELEPHONE (OUTPATIENT)
Dept: FAMILY MEDICINE | Facility: CLINIC | Age: 73
End: 2018-02-28

## 2018-02-28 NOTE — TELEPHONE ENCOUNTER
----- Message from Jesu Wren sent at 2/28/2018  4:44 PM CST -----  Contact: Dr Delgado Office  Ms Delgado  Calling TO speak with nurse regarding labs paperwork. Fax [phone] the patient in office now,and need results.Lab paperwork was requested before

## 2018-03-06 ENCOUNTER — OFFICE VISIT (OUTPATIENT)
Dept: UROLOGY | Facility: CLINIC | Age: 73
End: 2018-03-06
Payer: MEDICARE

## 2018-03-06 VITALS — HEIGHT: 69 IN | BODY MASS INDEX: 39.93 KG/M2 | WEIGHT: 269.63 LBS

## 2018-03-06 DIAGNOSIS — R35.1 NOCTURIA MORE THAN TWICE PER NIGHT: ICD-10-CM

## 2018-03-06 DIAGNOSIS — R39.15 URINARY URGENCY: ICD-10-CM

## 2018-03-06 DIAGNOSIS — N39.41 URGENCY INCONTINENCE: Primary | ICD-10-CM

## 2018-03-06 DIAGNOSIS — N39.3 SUI (STRESS URINARY INCONTINENCE, FEMALE): ICD-10-CM

## 2018-03-06 PROCEDURE — 99204 OFFICE O/P NEW MOD 45 MIN: CPT | Mod: S$PBB,,, | Performed by: UROLOGY

## 2018-03-06 PROCEDURE — 87086 URINE CULTURE/COLONY COUNT: CPT

## 2018-03-06 PROCEDURE — 99999 PR PBB SHADOW E&M-EST. PATIENT-LVL III: CPT | Mod: PBBFAC,,, | Performed by: UROLOGY

## 2018-03-06 PROCEDURE — 87088 URINE BACTERIA CULTURE: CPT

## 2018-03-06 PROCEDURE — 81001 URINALYSIS AUTO W/SCOPE: CPT | Mod: PBBFAC | Performed by: UROLOGY

## 2018-03-06 PROCEDURE — 99213 OFFICE O/P EST LOW 20 MIN: CPT | Mod: PBBFAC | Performed by: UROLOGY

## 2018-03-06 PROCEDURE — 87186 SC STD MICRODIL/AGAR DIL: CPT

## 2018-03-06 PROCEDURE — 87077 CULTURE AEROBIC IDENTIFY: CPT

## 2018-03-06 RX ORDER — OXYBUTYNIN CHLORIDE 5 MG/1
5 TABLET, EXTENDED RELEASE ORAL DAILY
Qty: 30 TABLET | Refills: 11 | Status: SHIPPED | OUTPATIENT
Start: 2018-03-06 | End: 2018-06-26 | Stop reason: DRUGHIGH

## 2018-03-06 NOTE — LETTER
March 6, 2018      Marie Cadena PA-C  [address] [facility]  [place] [address]           [facility]  [address] [facility] [address]  [place] [address]  Phone: [phone]  Fax: [phone]          Patient: Luisana Chun   MR Number: [id number]   YOB: 1945   Date of Visit: 3/6/2018       Dear Marie Cadena:    Thank you for referring Luisana Chun to me for evaluation. Attached you will find relevant portions of my assessment and plan of care.    If you have questions, please do not hesitate to call me. I look forward to following Luisana Chun along with you.    Sincerely,    ADRIANO Armstrong MD    Enclosure  CC:  No Recipients    If you would like to receive this communication electronically, please contact [email or web address] or [phone] to request more information on [employer] access.    For providers and/or their staff who would like to refer a patient to [facility], please contact us through our one-stop-shop provider referral line, [facility], at [phone].    If you feel you have received this communication in error or would no longer like to receive these types of communications, please e-mail [email or web address]

## 2018-03-06 NOTE — PROGRESS NOTES
Subjective:       Patient ID: Luisana Chun is a 72 y.o. female who was referred by Marie Cadena PA-C    Chief Complaint:   Chief Complaint   Patient presents with    Urinary Incontinence     new pt coming in for incontinence        Urinary Incontinence  Patient complains of urinary incontinence. This has been present for several years. She leaks urine with bending, coughing, lifting, laughing, sneezing, with urge, with a full bladder. Patient describes the symptoms as nocturia 4 times per night, urge to urinate with little or no warning and urine leakage with coughing/heavy physical activity. Factors associated with symptoms include none known. Evaluation to date includes none. Treatment to date includes none.      ACTIVE MEDICAL ISSUES:  Patient Active Problem List   Diagnosis    Osteoporosis without pathological fracture    Menopausal state    Secondary hyperparathyroidism    Hypertension, uncontrolled    Obesity, Class II, BMI 35-39.9    Vitamin D deficiency    Normocytic anemia    Rosacea    Bilateral chronic knee pain    Sleep disturbance    KING (obstructive sleep apnea)    CKD (chronic kidney disease), stage IV    Hypertensive CKD (chronic kidney disease)    Primary hyperparathyroidism    Pedal edema    Abnormal urinalysis    Morbid obesity due to excess calories    Mild episode of recurrent major depressive disorder    Primary thyroid cancer       PAST MEDICAL HISTORY  Past Medical History:   Diagnosis Date    Hypertension     Kidney disease, chronic, stage III (GFR 30-59 ml/min)     Obesity     Osteoporosis     Thyroid cancer        PAST SURGICAL HISTORY:  Past Surgical History:   Procedure Laterality Date    APPENDECTOMY      BACK SURGERY  february 2016    COLONOSCOPY      ESOPHAGOGASTRODUODENOSCOPY      KNEE SURGERY Bilateral     left toe Left     TONSILLECTOMY      tumor biopsy thyroid         SOCIAL HISTORY:  Social History   Substance Use Topics    Smoking  status: Former Smoker    Smokeless tobacco: Never Used    Alcohol use 0.0 oz/week      Comment: social       FAMILY HISTORY:  Family History   Problem Relation Age of Onset    Hypertension Mother     Heart disease Mother     Macular degeneration Mother     Hypertension Father     Cataracts Father     Glaucoma Father     Macular degeneration Father     Glaucoma Brother     Macular degeneration Brother     No Known Problems Sister     No Known Problems Maternal Aunt     No Known Problems Maternal Uncle     No Known Problems Paternal Aunt     No Known Problems Paternal Uncle     Macular degeneration Maternal Grandmother     Macular degeneration Maternal Grandfather     Macular degeneration Paternal Grandmother     Glaucoma Paternal Grandfather     Macular degeneration Paternal Grandfather     Amblyopia Neg Hx     Blindness Neg Hx     Cancer Neg Hx     Diabetes Neg Hx     Retinal detachment Neg Hx     Strabismus Neg Hx     Stroke Neg Hx     Thyroid disease Neg Hx        ALLERGIES AND MEDICATIONS: updated and reviewed.  Review of patient's allergies indicates:   Allergen Reactions    Amlodipine      Ankle swelling     Current Outpatient Prescriptions   Medication Sig    amLODIPine (NORVASC) 2.5 MG tablet     calcitRIOL (ROCALTROL) 0.5 MCG Cap TAKE ONE CAPSULE BY MOUTH ONCE DAILY    ergocalciferol (ERGOCALCIFEROL) 50,000 unit Cap Take 1 capsule (50,000 Units total) by mouth once daily.    estradiol (ESTRACE) 0.5 MG tablet TAKE ONE TABLET BY MOUTH ONCE DAILY    fluocinonide (LIDEX) 0.05 % ointment Apply topically once daily.     FLUoxetine (PROZAC) 40 MG capsule Take 2 capsules (80 mg total) by mouth once daily.    fluticasone (FLONASE) 50 mcg/actuation nasal spray 1 spray (50 mcg total) by Each Nare route 2 (two) times daily.    hydrocortisone (PROCTO-RAZA) 1 % crpe INSERT ONE APPLICATORFUL RECTALLY TWICE DAILY AS NEEDED    hydrocortisone 2.5 % cream Apply topically once daily.      "irbesartan (AVAPRO) 300 MG tablet TAKE ONE TABLET BY MOUTH IN THE EVENING    levothyroxine (SYNTHROID) 25 MCG tablet Take 25 mcg by mouth once daily.    NIFEdipine (PROCARDIA-XL) 30 MG (OSM) 24 hr tablet Take 1 tablet (30 mg total) by mouth once daily.    PHENYLEPH/MINERAL OIL/PETROLAT (PREPARATION H RECT) Place rectally once daily.    sodium bicarbonate 325 MG tablet Take 325 mg by mouth 4 (four) times daily.    trazodone (DESYREL) 100 MG tablet Take 1 tablet (100 mg total) by mouth every evening.    oxybutynin (DITROPAN-XL) 5 MG TR24 Take 1 tablet (5 mg total) by mouth once daily.     No current facility-administered medications for this visit.        Review of Systems   Constitutional: Negative for activity change, fatigue, fever and unexpected weight change.   Eyes: Negative for redness and visual disturbance.   Respiratory: Negative for chest tightness and shortness of breath.    Cardiovascular: Negative for chest pain and leg swelling.   Gastrointestinal: Negative for abdominal distention, abdominal pain, constipation, diarrhea, nausea and vomiting.   Genitourinary: Negative for difficulty urinating, dysuria, flank pain, frequency, hematuria, pelvic pain, urgency and vaginal bleeding.   Musculoskeletal: Negative for arthralgias and joint swelling.   Neurological: Negative for dizziness, weakness and headaches.   Psychiatric/Behavioral: Negative for confusion. The patient is not nervous/anxious.    All other systems reviewed and are negative.      Objective:      Vitals:    03/06/18 1531   Weight: 122.3 kg (269 lb 10 oz)   Height: 5' 9" (1.753 m)     Physical Exam   Nursing note and vitals reviewed.  Constitutional: She is oriented to person, place, and time. She appears well-developed.   HENT:   Head: Normocephalic.   Eyes: Conjunctivae are normal.   Neck: Normal range of motion. No tracheal deviation present. No thyromegaly present.   Cardiovascular: Normal rate, normal heart sounds and normal pulses.  "   Pulmonary/Chest: Effort normal and breath sounds normal. No respiratory distress. She has no wheezes.   Abdominal: Soft. She exhibits no distension and no mass. There is no hepatosplenomegaly. There is no tenderness. There is no rebound, no guarding and no CVA tenderness. No hernia.   Musculoskeletal: Normal range of motion. She exhibits no edema or tenderness.   Lymphadenopathy:     She has no cervical adenopathy.   Neurological: She is alert and oriented to person, place, and time.   Skin: Skin is warm and dry. No rash noted. No erythema.     Psychiatric: She has a normal mood and affect. Her behavior is normal. Judgment and thought content normal.       Urine dipstick shows negative for all components.  Micro exam: negative for WBC's or RBC's.    Assessment:       1. Urgency incontinence    2. FABIENNE (stress urinary incontinence, female)    3. Nocturia more than twice per night    4. Urinary urgency          Plan:       1. Urgency incontinence    - oxybutynin (DITROPAN-XL) 5 MG TR24; Take 1 tablet (5 mg total) by mouth once daily.  Dispense: 30 tablet; Refill: 11  - POCT urinalysis, dipstick or tablet reag  - US Retroperitoneal Complete (Kidney and; Future    2. FABIENNE (stress urinary incontinence, female)  Kegel exercises    3. Nocturia more than twice per night      4. Urinary urgency  She may need urodynamics    - Urine culture            Follow-up in about 3 months (around 6/6/2018) for Follow up, Review X-ray.

## 2018-03-07 LAB
BILIRUB SERPL-MCNC: NORMAL MG/DL
BLOOD URINE, POC: NORMAL
COLOR, POC UA: YELLOW
GLUCOSE UR QL STRIP: NORMAL
KETONES UR QL STRIP: NORMAL
LEUKOCYTE ESTERASE URINE, POC: NORMAL
NITRITE, POC UA: POSITIVE
PH, POC UA: 5
PROTEIN, POC: NORMAL
SPECIFIC GRAVITY, POC UA: 1030
UROBILINOGEN, POC UA: NORMAL

## 2018-03-08 ENCOUNTER — TELEPHONE (OUTPATIENT)
Dept: UROLOGY | Facility: CLINIC | Age: 73
End: 2018-03-08

## 2018-03-08 DIAGNOSIS — R30.0 DYSURIA: Primary | ICD-10-CM

## 2018-03-08 LAB — BACTERIA UR CULT: NORMAL

## 2018-03-08 RX ORDER — AMOXICILLIN AND CLAVULANATE POTASSIUM 875; 125 MG/1; MG/1
1 TABLET, FILM COATED ORAL 2 TIMES DAILY
Qty: 20 TABLET | Refills: 0 | Status: SHIPPED | OUTPATIENT
Start: 2018-03-08 | End: 2018-03-26

## 2018-03-12 ENCOUNTER — TELEPHONE (OUTPATIENT)
Dept: FAMILY MEDICINE | Facility: CLINIC | Age: 73
End: 2018-03-12

## 2018-03-12 NOTE — TELEPHONE ENCOUNTER
Patient calling regarding information below. Pt will call back to schedule visit regarding ankle pain. Note will call back if any cancellation. Patient requesting for oxygen condenser concentrator  Due to not being able to get CPAP. Pt stated had it before and it helped. Please advise

## 2018-03-12 NOTE — TELEPHONE ENCOUNTER
----- Message from Janet Chung sent at 3/12/2018 11:33 AM CDT -----  Contact: self  Patient called stating that she need to see someone at clinic today for severe pain in ankles and discuss getting oxygen condenser. No OV were available.  Please contact her at [phone].    Thanks!

## 2018-03-16 ENCOUNTER — OFFICE VISIT (OUTPATIENT)
Dept: FAMILY MEDICINE | Facility: CLINIC | Age: 73
End: 2018-03-16
Payer: MEDICARE

## 2018-03-16 VITALS
HEIGHT: 69 IN | WEIGHT: 265.88 LBS | BODY MASS INDEX: 39.38 KG/M2 | SYSTOLIC BLOOD PRESSURE: 166 MMHG | HEART RATE: 69 BPM | DIASTOLIC BLOOD PRESSURE: 80 MMHG | TEMPERATURE: 99 F | RESPIRATION RATE: 16 BRPM | OXYGEN SATURATION: 96 %

## 2018-03-16 DIAGNOSIS — E88.89 OTHER SPECIFIED METABOLIC DISORDERS: ICD-10-CM

## 2018-03-16 DIAGNOSIS — E89.0 S/P THYROIDECTOMY: ICD-10-CM

## 2018-03-16 DIAGNOSIS — G47.9 SLEEP DISTURBANCE: ICD-10-CM

## 2018-03-16 DIAGNOSIS — J96.11 CHRONIC RESPIRATORY FAILURE WITH HYPOXIA: Primary | ICD-10-CM

## 2018-03-16 DIAGNOSIS — I10 HYPERTENSION, UNCONTROLLED: ICD-10-CM

## 2018-03-16 PROCEDURE — 99999 PR PBB SHADOW E&M-EST. PATIENT-LVL IV: CPT | Mod: PBBFAC,,, | Performed by: FAMILY MEDICINE

## 2018-03-16 PROCEDURE — 99215 OFFICE O/P EST HI 40 MIN: CPT | Mod: S$PBB,,, | Performed by: FAMILY MEDICINE

## 2018-03-16 PROCEDURE — 99214 OFFICE O/P EST MOD 30 MIN: CPT | Mod: PBBFAC,PO | Performed by: FAMILY MEDICINE

## 2018-03-16 RX ORDER — ONDANSETRON 4 MG/1
4 TABLET, ORALLY DISINTEGRATING ORAL
COMMUNITY
Start: 2018-03-13 | End: 2018-05-10

## 2018-03-16 RX ORDER — LEVOTHYROXINE SODIUM 150 UG/1
150 TABLET ORAL 3 TIMES DAILY
COMMUNITY
Start: 2018-03-14 | End: 2018-10-09 | Stop reason: SDUPTHER

## 2018-03-16 RX ORDER — TRAZODONE HYDROCHLORIDE 300 MG/1
300 TABLET ORAL NIGHTLY
Qty: 30 TABLET | Refills: 3 | Status: SHIPPED | OUTPATIENT
Start: 2018-03-16 | End: 2018-05-10 | Stop reason: SDUPTHER

## 2018-03-16 RX ORDER — DOXYCYCLINE 100 MG/1
100 CAPSULE ORAL DAILY
COMMUNITY
Start: 2018-03-02 | End: 2018-05-10

## 2018-03-16 RX ORDER — MUPIROCIN 20 MG/G
OINTMENT TOPICAL DAILY
COMMUNITY
Start: 2018-03-02 | End: 2018-05-10

## 2018-03-16 RX ORDER — METOPROLOL SUCCINATE 25 MG/1
25 TABLET, EXTENDED RELEASE ORAL DAILY
Qty: 90 TABLET | Refills: 2 | Status: SHIPPED | OUTPATIENT
Start: 2018-03-16 | End: 2018-05-07 | Stop reason: SDUPTHER

## 2018-03-16 NOTE — PROGRESS NOTES
Subjective:       Patient ID: Luisana Chun is a 72 y.o. female.    Chief Complaint: Consult (regarding getting oxygen concentrator )    HPI    Chronic Resp Failure - Pt has worsening [place], and after her o2 sats were checked her today in the office, her o2 sats dropped significantly upon walking, which was completely restored with supplemental oxygen at 2L per minute. No h/o lung disease other than KING which is currently untreated.       Htn - remains uncontrolled despite adherence with her medications. No cp, palp, but does have some int pedal edema and PEOPLES as above.     Pt is doing well s/p thyroidectomy, removal of the second half.     Sleep disturbance  - pt still has trouble with sleep initiation and and awakenings. No relief with trazodone or valerian root.          Outpatient Prescriptions Marked as Taking for the 3/16/18 encounter (Office Visit) with Scott Dave MD   Medication Sig Dispense Refill    amoxicillin-clavulanate 875-125mg (AUGMENTIN) 875-125 mg per tablet Take 1 tablet by mouth 2 (two) times daily. 20 tablet 0    calcitRIOL (ROCALTROL) 0.5 MCG Cap TAKE ONE CAPSULE BY MOUTH ONCE DAILY 90 capsule 1    doxycycline (VIBRAMYCIN) 100 MG Cap Take 100 mg by mouth once daily.      ergocalciferol (ERGOCALCIFEROL) 50,000 unit Cap Take 1 capsule (50,000 Units total) by mouth once daily. 5 capsule 0    fluocinonide (LIDEX) 0.05 % ointment Apply topically once daily.       FLUoxetine (PROZAC) 40 MG capsule Take 2 capsules (80 mg total) by mouth once daily. 180 capsule 0    fluticasone (FLONASE) 50 mcg/actuation nasal spray 1 spray (50 mcg total) by Each Nare route 2 (two) times daily. 1 Bottle 11    hydrocortisone (PROCTO-RAZA) 1 % crpe INSERT ONE APPLICATORFUL RECTALLY TWICE DAILY AS NEEDED 28.4 g 8    hydrocortisone 2.5 % cream Apply topically once daily.       irbesartan (AVAPRO) 300 MG tablet TAKE ONE TABLET BY MOUTH IN THE EVENING 90 tablet 3    levothyroxine (SYNTHROID) 150 MCG  tablet Take 150 mcg by mouth 3 (three) times daily.      mupirocin (BACTROBAN) 2 % ointment Apply topically once daily.      NIFEdipine (PROCARDIA-XL) 30 MG (OSM) 24 hr tablet Take 1 tablet (30 mg total) by mouth once daily. 30 tablet 11    ondansetron (ZOFRAN-ODT) 4 MG TbDL Take 4 mg by mouth as needed.      oxybutynin (DITROPAN-XL) 5 MG TR24 Take 1 tablet (5 mg total) by mouth once daily. 30 tablet 11    PHENYLEPH/MINERAL OIL/PETROLAT (PREPARATION H RECT) Place rectally once daily.      sodium bicarbonate 325 MG tablet Take 325 mg by mouth 4 (four) times daily.      [DISCONTINUED] amLODIPine (NORVASC) 2.5 MG tablet Take 2.5 mg by mouth once daily.       [DISCONTINUED] trazodone (DESYREL) 100 MG tablet Take 1 tablet (100 mg total) by mouth every evening. 90 tablet 1       Past Medical History:   Diagnosis Date    Hypertension     Kidney disease, chronic, stage III (GFR 30-59 ml/min)     Obesity     Osteoporosis     Thyroid cancer        Family History   Problem Relation Age of Onset    Hypertension Mother     Heart disease Mother     Macular degeneration Mother     Hypertension Father     Cataracts Father     Glaucoma Father     Macular degeneration Father     Glaucoma Brother     Macular degeneration Brother     No Known Problems Sister     No Known Problems Maternal Aunt     No Known Problems Maternal Uncle     No Known Problems Paternal Aunt     No Known Problems Paternal Uncle     Macular degeneration Maternal Grandmother     Macular degeneration Maternal Grandfather     Macular degeneration Paternal Grandmother     Glaucoma Paternal Grandfather     Macular degeneration Paternal Grandfather     Amblyopia Neg Hx     Blindness Neg Hx     Cancer Neg Hx     Diabetes Neg Hx     Retinal detachment Neg Hx     Strabismus Neg Hx     Stroke Neg Hx     Thyroid disease Neg Hx         reports that she has quit smoking. She has never used smokeless tobacco. She reports that she drinks  alcohol. She reports that she does not use drugs.    Review of Systems   Constitutional: Negative for chills and fever.   Respiratory: Positive for shortness of breath. Negative for cough.        Objective:     Vitals:    03/16/18 1412   BP: (!) 166/80   Pulse: 69   Resp: 16   Temp: 99.3 °F (37.4 °C)        Physical Exam   Constitutional: She appears well-developed. No distress.   HENT:   Head: Normocephalic and atraumatic.   Eyes: Conjunctivae are normal. Right eye exhibits no discharge. Left eye exhibits no discharge. No scleral icterus.   Cardiovascular: Normal rate, regular rhythm and normal heart sounds.  Exam reveals no gallop and no friction rub.    No murmur heard.  Pulmonary/Chest: Effort normal and breath sounds normal. No respiratory distress. She has no wheezes. She has no rales.   Neurological: She is alert.   Skin: She is not diaphoretic.   Psychiatric: She has a normal mood and affect.   Vitals reviewed.      Assessment:       1. Chronic respiratory failure with hypoxia    2. Hypertension, uncontrolled    3. S/P thyroidectomy    4. Other specified metabolic disorders     5. Sleep disturbance        Plan:       Luisana was seen today for consult.    Diagnoses and all orders for this visit:     Chronic respiratory failure with hypoxia - HIGH RISK COMPLAINT WITHOUT ETIOLOGY  -     2D Echo Only; Future  -     Complete PFT with bronchodilator; Future  -     OXYGEN FOR HOME USE  New dx - pt educated on disease etiology, prognosis and treatment. Answered pts questions.    No etiology - will obtain studies as above and order home 02 for exertional use.     Pt satting 95% on Ra at rest, 88% on RA with brief 15 foot ambulation, and 97% with 2LNC at rest and 96% with ambulation even after 60 seconds.     Hypertension, uncontrolled  -     metoprolol succinate (TOPROL-XL) 25 MG 24 hr tablet; Take 1 tablet (25 mg total) by mouth once daily.  Pts blood pressure is uncontrolled. I advised the following lifestyle  changes:  - exercise for 20 mins x 3-5 a week per AHA recommendations  - weight reduction if overweight by calorie restriction  - increase consumption of fruit/vegetables to 5 or more servings a day        - reduce sodium intake    At this stage, we discussed that their risk for MI and CVA is too high with their current BP, and will add metoprolol succ 25mg          Pt asked to keep BP log with date/BP, taking BP at least 4 x a week. They will bring this with them to their next appointment.     Pt asked to call me if BPs consistently above 140/90.    Pts questions were answered.    The 10-year CVD risk score (HUDSON'Agostino, et al., 2008) is: 28.8%    Values used to calculate the score:      Age: 72 years      Sex: Female      Diabetic: No      Tobacco smoker: No      Systolic Blood Pressure: 166 mmHg      Is BP treated: Yes      HDL Cholesterol: 31 mg/dL      Total Cholesterol: 159 mg/dL    S/P thyroidectomy - pt is doing well. Pt encouraged to keep post op visit.       Other specified metabolic disorders   -     Complete PFT with bronchodilator; Future    Sleep disturbance  -     traZODone (DESYREL) 300 MG tablet; Take 1 tablet (300 mg total) by mouth every evening.    Will try 300mg. If this in ineffective will refer to sleep specialist other than Dr Murdock per pt request.         Follow-up in about 3 days (around 3/19/2018) for bilateral foot pain, + 2 other.        Pt verbalized understanding and agreed with our plan.

## 2018-03-26 ENCOUNTER — OFFICE VISIT (OUTPATIENT)
Dept: FAMILY MEDICINE | Facility: CLINIC | Age: 73
End: 2018-03-26
Payer: MEDICARE

## 2018-03-26 VITALS
TEMPERATURE: 99 F | RESPIRATION RATE: 16 BRPM | HEIGHT: 69 IN | HEART RATE: 72 BPM | WEIGHT: 265.19 LBS | BODY MASS INDEX: 39.28 KG/M2 | OXYGEN SATURATION: 96 % | DIASTOLIC BLOOD PRESSURE: 72 MMHG | SYSTOLIC BLOOD PRESSURE: 134 MMHG

## 2018-03-26 DIAGNOSIS — G89.28 CHRONIC KNEE PAIN AFTER TOTAL REPLACEMENT OF BOTH KNEE JOINTS: ICD-10-CM

## 2018-03-26 DIAGNOSIS — M25.571 CHRONIC PAIN OF RIGHT ANKLE: Primary | ICD-10-CM

## 2018-03-26 DIAGNOSIS — G89.29 CHRONIC PAIN OF RIGHT ANKLE: Primary | ICD-10-CM

## 2018-03-26 DIAGNOSIS — G47.00 INSOMNIA, UNSPECIFIED TYPE: ICD-10-CM

## 2018-03-26 DIAGNOSIS — M25.562 CHRONIC KNEE PAIN AFTER TOTAL REPLACEMENT OF BOTH KNEE JOINTS: ICD-10-CM

## 2018-03-26 DIAGNOSIS — M25.561 CHRONIC KNEE PAIN AFTER TOTAL REPLACEMENT OF BOTH KNEE JOINTS: ICD-10-CM

## 2018-03-26 DIAGNOSIS — R26.81 GAIT INSTABILITY: ICD-10-CM

## 2018-03-26 DIAGNOSIS — Z96.653 CHRONIC KNEE PAIN AFTER TOTAL REPLACEMENT OF BOTH KNEE JOINTS: ICD-10-CM

## 2018-03-26 PROCEDURE — 99214 OFFICE O/P EST MOD 30 MIN: CPT | Mod: S$PBB,,, | Performed by: FAMILY MEDICINE

## 2018-03-26 PROCEDURE — 99999 PR PBB SHADOW E&M-EST. PATIENT-LVL IV: CPT | Mod: PBBFAC,,, | Performed by: FAMILY MEDICINE

## 2018-03-26 PROCEDURE — 99214 OFFICE O/P EST MOD 30 MIN: CPT | Mod: PBBFAC,PO | Performed by: FAMILY MEDICINE

## 2018-03-26 RX ORDER — TRAMADOL HYDROCHLORIDE 50 MG/1
50 TABLET ORAL EVERY 12 HOURS PRN
Qty: 30 TABLET | Refills: 0 | Status: SHIPPED | OUTPATIENT
Start: 2018-03-26 | End: 2018-04-05

## 2018-03-26 NOTE — PROGRESS NOTES
Subjective:       Patient ID: Luisana Chun is a 72 y.o. female.    Chief Complaint: bilateral foot pain    HPI    Bilateral ankle pain - new - R > L pain - progressively worsening over the last several months. Worsened by extensive walking.      Pain is intermittent, and is much better when she is sitting or laying down.     Pt does plan on seeing bariatrics at [facility] to discuss sleeve surgery.     Pt also is requesting a home elevator as she lives on the second floor of a single family home while someone else owns downstairs.     Pt also notes that her gait has become increasingly unable due to pain in her knees and in her ankles. Patient is requesting cane for stability.      Insomnia- pt continues to have difficulty falling asleep and staying asleep for anxiety and depression with fluoxetine, trazodone at night, or otc treatments (melatonin or valerian root).       Outpatient Prescriptions Marked as Taking for the 3/26/18 encounter (Office Visit) with Scott Dave MD   Medication Sig Dispense Refill    calcitRIOL (ROCALTROL) 0.5 MCG Cap TAKE ONE CAPSULE BY MOUTH ONCE DAILY 90 capsule 1    ergocalciferol (ERGOCALCIFEROL) 50,000 unit Cap Take 1 capsule (50,000 Units total) by mouth once daily. 5 capsule 0    fluocinonide (LIDEX) 0.05 % ointment Apply topically once daily.       FLUoxetine (PROZAC) 40 MG capsule Take 2 capsules (80 mg total) by mouth once daily. 180 capsule 0    fluticasone (FLONASE) 50 mcg/actuation nasal spray 1 spray (50 mcg total) by Each Nare route 2 (two) times daily. 1 Bottle 11    hydrocortisone (PROCTO-RAZA) 1 % crpe INSERT ONE APPLICATORFUL RECTALLY TWICE DAILY AS NEEDED 28.4 g 8    hydrocortisone 2.5 % cream Apply topically once daily.       irbesartan (AVAPRO) 300 MG tablet TAKE ONE TABLET BY MOUTH IN THE EVENING 90 tablet 3    levothyroxine (SYNTHROID) 150 MCG tablet Take 150 mcg by mouth 3 (three) times daily.      metoprolol succinate (TOPROL-XL) 25 MG 24 hr  tablet Take 1 tablet (25 mg total) by mouth once daily. 90 tablet 2    mupirocin (BACTROBAN) 2 % ointment Apply topically once daily.      NIFEdipine (PROCARDIA-XL) 30 MG (OSM) 24 hr tablet Take 1 tablet (30 mg total) by mouth once daily. 30 tablet 11    ondansetron (ZOFRAN-ODT) 4 MG TbDL Take 4 mg by mouth as needed.      oxybutynin (DITROPAN-XL) 5 MG TR24 Take 1 tablet (5 mg total) by mouth once daily. 30 tablet 11    PHENYLEPH/MINERAL OIL/PETROLAT (PREPARATION H RECT) Place rectally once daily.      sodium bicarbonate 325 MG tablet Take 325 mg by mouth 4 (four) times daily.      traZODone (DESYREL) 300 MG tablet Take 1 tablet (300 mg total) by mouth every evening. 30 tablet 3    [DISCONTINUED] amoxicillin-clavulanate 875-125mg (AUGMENTIN) 875-125 mg per tablet Take 1 tablet by mouth 2 (two) times daily. 20 tablet 0       Past Medical History:   Diagnosis Date    Hypertension     Kidney disease, chronic, stage III (GFR 30-59 ml/min)     Obesity     Osteoporosis     Thyroid cancer        Family History   Problem Relation Age of Onset    Hypertension Mother     Heart disease Mother     Macular degeneration Mother     Hypertension Father     Cataracts Father     Glaucoma Father     Macular degeneration Father     Glaucoma Brother     Macular degeneration Brother     No Known Problems Sister     No Known Problems Maternal Aunt     No Known Problems Maternal Uncle     No Known Problems Paternal Aunt     No Known Problems Paternal Uncle     Macular degeneration Maternal Grandmother     Macular degeneration Maternal Grandfather     Macular degeneration Paternal Grandmother     Glaucoma Paternal Grandfather     Macular degeneration Paternal Grandfather     Amblyopia Neg Hx     Blindness Neg Hx     Cancer Neg Hx     Diabetes Neg Hx     Retinal detachment Neg Hx     Strabismus Neg Hx     Stroke Neg Hx     Thyroid disease Neg Hx         reports that she has quit smoking. She has never  used smokeless tobacco. She reports that she drinks alcohol. She reports that she does not use drugs.    Review of Systems   Constitutional: Negative for chills and fever.   Respiratory: Negative for shortness of breath and wheezing.    Cardiovascular: Negative for chest pain and palpitations.       Objective:     Vitals:    03/26/18 1532   BP: 134/72   Pulse: 72   Resp: 16   Temp: 99 °F (37.2 °C)        Physical Exam   Constitutional: She appears well-developed. No distress.   HENT:   Head: Normocephalic and atraumatic.   Eyes: Conjunctivae are normal. No scleral icterus.   Pulmonary/Chest: Effort normal.   Musculoskeletal: She exhibits edema (LLE trace).        Right ankle: She exhibits decreased range of motion. She exhibits no swelling, no ecchymosis, no deformity, no laceration and normal pulse. No tenderness.   bilat pez planus   Neurological: She is alert.   Skin: She is not diaphoretic.   Psychiatric: She has a normal mood and affect. Her behavior is normal.   Vitals reviewed.    Pain on forced inversion.   Assessment:       1. Chronic pain of right ankle    2. Gait instability    3. 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 Insomnia, unspecified type        Plan:       Luisana was seen today for bilateral foot pain.    Diagnoses and all orders for this visit:    Chronic pain of right ankle  -     CANE FOR HOME USE  -     HME - OTHER  -     traMADol (ULTRAM) 50 mg tablet; Take 1 tablet (50 mg total) by mouth every 12 (twelve) hours as needed for Pain.  Advised weight loss, supportive footwear. Will provide tramadol for rare use. Pt is aware of potential addictive nature of tramadol. If she uses when she is in pain the risk of dependency or addiction is much lower.     No nsaids due to CKD.     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    Insomnia, unspecified type  -     Ambulatory consult to Sleep Disorders            Follow-up in about 4 weeks (around 4/23/2018) for chronic knee and ankle pain - tramadol helping.        Pt verbalized understanding and agreed with our plan.

## 2018-03-27 ENCOUNTER — TELEPHONE (OUTPATIENT)
Dept: FAMILY MEDICINE | Facility: CLINIC | Age: 73
End: 2018-03-27

## 2018-03-27 NOTE — TELEPHONE ENCOUNTER
----- Message from Scott Dave MD sent at 3/27/2018  3:01 PM CDT -----  Regarding: FW: Nikole with [facility]      ----- Message -----  From: Nikole Olivo  Sent: 3/27/2018  12:12 PM  To: Scott Dave MD  Subject: Nikole with [facility]                                   We received an order for oxygen, but I do not have the documented testing to continue processing this order.  I can be reached at [phone].    Thank you,   Nikole

## 2018-03-27 NOTE — TELEPHONE ENCOUNTER
spoke to ivanna; she did see testing numbers and states orders have been sent to [facility]

## 2018-04-04 ENCOUNTER — TELEPHONE (OUTPATIENT)
Dept: FAMILY MEDICINE | Facility: CLINIC | Age: 73
End: 2018-04-04

## 2018-04-04 NOTE — TELEPHONE ENCOUNTER
Orders was placed at last office visit along with additional needed note faxed. Notified pt to call [facility] to check on status if now covered. Pt verbalized understanding

## 2018-04-04 NOTE — TELEPHONE ENCOUNTER
----- Message from Nanette Snyder sent at 4/4/2018  2:22 PM CDT -----  Contact: Juan- [employer]   They received orders for Home Oxygen but never received the Oxygen testing to qualify her. Please call at [phone].  Fax: [phone] Alternate fax: [phone]. Ask for Juan or Veronica

## 2018-04-04 NOTE — TELEPHONE ENCOUNTER
----- Message from Aparna Austin sent at 4/4/2018  1:35 PM CDT -----  Contact: self  Pt calling to state that she lost her orders for a condenser. Please call [phone]

## 2018-04-16 ENCOUNTER — TELEPHONE (OUTPATIENT)
Dept: FAMILY MEDICINE | Facility: CLINIC | Age: 73
End: 2018-04-16

## 2018-04-16 NOTE — TELEPHONE ENCOUNTER
----- Message from Gabe Ko sent at 4/16/2018  9:45 AM CDT -----  Contact: self  Pt would like to speak with nurse to discuss her blood pressure medications. Please contact her at [phone].    Thanks

## 2018-04-17 ENCOUNTER — HOSPITAL ENCOUNTER (OUTPATIENT)
Dept: CARDIOLOGY | Facility: HOSPITAL | Age: 73
Discharge: HOME | End: 2018-04-17
Attending: FAMILY MEDICINE
Payer: MEDICARE

## 2018-04-17 ENCOUNTER — HOSPITAL ENCOUNTER (OUTPATIENT)
Dept: RESPIRATORY THERAPY | Facility: HOSPITAL | Age: 73
Discharge: HOME | End: 2018-04-17
Attending: FAMILY MEDICINE
Payer: MEDICARE

## 2018-04-17 DIAGNOSIS — E88.89 OTHER SPECIFIED METABOLIC DISORDERS: ICD-10-CM

## 2018-04-17 DIAGNOSIS — J96.11 CHRONIC RESPIRATORY FAILURE WITH HYPOXIA: ICD-10-CM

## 2018-04-17 LAB
ESTIMATED PA SYSTOLIC PRESSURE: 13.63
MITRAL VALVE REGURGITATION: NORMAL
RETIRED EF AND QEF - SEE NOTES: 55 (ref 55–65)
TRICUSPID VALVE REGURGITATION: NORMAL

## 2018-04-17 PROCEDURE — 93306 TTE W/DOPPLER COMPLETE: CPT

## 2018-04-17 PROCEDURE — 93306 TTE W/DOPPLER COMPLETE: CPT | Mod: 26,,, | Performed by: INTERNAL MEDICINE

## 2018-04-18 ENCOUNTER — TELEPHONE (OUTPATIENT)
Dept: UROLOGY | Facility: CLINIC | Age: 73
End: 2018-04-18

## 2018-04-18 NOTE — TELEPHONE ENCOUNTER
----- Message from Waylon Marr sent at 4/18/2018 11:00 AM CDT -----  Contact: Luisana [phone]  Pt is requesting a call back from the staff in regards to an upcoming ultrasound. Please call at your earliest convenience.

## 2018-04-18 NOTE — TELEPHONE ENCOUNTER
Pt thought she was having ultrasound of stomach but I advised it was of her kidneys an she asked why in June advised because it will be for he 3 month f/u. Pt fully understands./gabrielle

## 2018-04-19 ENCOUNTER — TELEPHONE (OUTPATIENT)
Dept: FAMILY MEDICINE | Facility: CLINIC | Age: 73
End: 2018-04-19

## 2018-04-19 NOTE — TELEPHONE ENCOUNTER
----- Message from Scott Dave MD sent at 4/19/2018  7:56 AM CDT -----  Please call patient and inform them that their results were grossly satisfactory! Her echo shows no signs of heart failure.  There is nothing that needs to be done urgently, and the results do not suggest any serious or life-threatening illness. I would like to review these results with this patient at their next appointment. Thanks

## 2018-04-27 DIAGNOSIS — Z12.39 BREAST CANCER SCREENING: ICD-10-CM

## 2018-04-30 ENCOUNTER — TELEPHONE (OUTPATIENT)
Dept: FAMILY MEDICINE | Facility: CLINIC | Age: 73
End: 2018-04-30

## 2018-04-30 DIAGNOSIS — R94.2 RESTRICTIVE PATTERN PRESENT ON PULMONARY FUNCTION TESTING: Primary | ICD-10-CM

## 2018-04-30 NOTE — TELEPHONE ENCOUNTER
Please inform patient that her lungs show sings of what is called a restrictive lung disease which is moderate which could explain her sxs. I am sending her to pulm.

## 2018-05-01 ENCOUNTER — TELEPHONE (OUTPATIENT)
Dept: FAMILY MEDICINE | Facility: CLINIC | Age: 73
End: 2018-05-01

## 2018-05-01 NOTE — TELEPHONE ENCOUNTER
Pt informed of below; verbalized understanding; she does not have OV scheduled with pulmonology and states she does not want referral to pulm; she has OV scheduled with you on monday

## 2018-05-01 NOTE — TELEPHONE ENCOUNTER
----- Message from Scott Dave MD sent at 4/30/2018  5:00 PM CDT -----  Please notify patient results are abnormal. Nothing needs to be don until she is evaluated further by pulm.

## 2018-05-07 ENCOUNTER — OFFICE VISIT (OUTPATIENT)
Dept: FAMILY MEDICINE | Facility: CLINIC | Age: 73
End: 2018-05-07
Payer: MEDICARE

## 2018-05-07 VITALS
HEIGHT: 69 IN | OXYGEN SATURATION: 97 % | SYSTOLIC BLOOD PRESSURE: 134 MMHG | DIASTOLIC BLOOD PRESSURE: 80 MMHG | HEART RATE: 74 BPM | TEMPERATURE: 100 F | BODY MASS INDEX: 40.07 KG/M2 | WEIGHT: 270.5 LBS | RESPIRATION RATE: 20 BRPM

## 2018-05-07 DIAGNOSIS — Z23 NEED FOR PNEUMOCOCCAL VACCINATION: ICD-10-CM

## 2018-05-07 DIAGNOSIS — I12.9 HYPERTENSIVE KIDNEY DISEASE WITH STAGE 4 CHRONIC KIDNEY DISEASE: ICD-10-CM

## 2018-05-07 DIAGNOSIS — N18.4 HYPERTENSIVE KIDNEY DISEASE WITH STAGE 4 CHRONIC KIDNEY DISEASE: ICD-10-CM

## 2018-05-07 DIAGNOSIS — K21.9 GASTROESOPHAGEAL REFLUX DISEASE, ESOPHAGITIS PRESENCE NOT SPECIFIED: ICD-10-CM

## 2018-05-07 DIAGNOSIS — I10 HYPERTENSION, UNCONTROLLED: ICD-10-CM

## 2018-05-07 DIAGNOSIS — R60.0 PEDAL EDEMA: ICD-10-CM

## 2018-05-07 DIAGNOSIS — J98.4 RESTRICTIVE LUNG DISEASE: Primary | ICD-10-CM

## 2018-05-07 DIAGNOSIS — R94.2 RESTRICTIVE PATTERN PRESENT ON PULMONARY FUNCTION TESTING: ICD-10-CM

## 2018-05-07 PROCEDURE — 99999 PR PBB SHADOW E&M-EST. PATIENT-LVL III: CPT | Mod: PBBFAC,,, | Performed by: FAMILY MEDICINE

## 2018-05-07 PROCEDURE — G0009 ADMIN PNEUMOCOCCAL VACCINE: HCPCS | Mod: PBBFAC,PO

## 2018-05-07 PROCEDURE — 99214 OFFICE O/P EST MOD 30 MIN: CPT | Mod: S$PBB,,, | Performed by: FAMILY MEDICINE

## 2018-05-07 PROCEDURE — 99213 OFFICE O/P EST LOW 20 MIN: CPT | Mod: PBBFAC,PO | Performed by: FAMILY MEDICINE

## 2018-05-07 RX ORDER — TRIAMCINOLONE ACETONIDE 1 MG/G
CREAM TOPICAL
COMMUNITY
Start: 2018-04-08

## 2018-05-07 RX ORDER — METOPROLOL SUCCINATE 100 MG/1
100 TABLET, EXTENDED RELEASE ORAL DAILY
Qty: 90 TABLET | Refills: 3 | Status: SHIPPED | OUTPATIENT
Start: 2018-05-07 | End: 2018-09-27

## 2018-05-07 RX ORDER — SUCRALFATE 1 G/1
1 TABLET ORAL 3 TIMES DAILY
Qty: 90 TABLET | Refills: 1 | Status: SHIPPED | OUTPATIENT
Start: 2018-05-07 | End: 2018-08-01 | Stop reason: SDUPTHER

## 2018-05-07 RX ORDER — AMLODIPINE BESYLATE 2.5 MG/1
TABLET ORAL
COMMUNITY
Start: 2018-04-21 | End: 2018-05-07

## 2018-05-07 RX ORDER — LEVOCETIRIZINE DIHYDROCHLORIDE 5 MG/1
TABLET, FILM COATED ORAL
COMMUNITY
Start: 2018-04-06 | End: 2018-11-07

## 2018-05-07 NOTE — PROGRESS NOTES
Pneumococcal 23 vaccine administered IM to left deltoid. Vis form declined. Tolerated well. No adverse reaction noted.

## 2018-05-07 NOTE — PROGRESS NOTES
Health Maintenance                Mammogram Scheduled with [facility]      Pneumococcal (65+) (2 of 2 - PPSV23) Ok to get today

## 2018-05-07 NOTE — PROGRESS NOTES
Subjective:       Patient ID: Luisana Chun is a 72 y.o. female.    Chief Complaint: Pain (shoulder(consult regarding cortisone shot), back, abdominal); Medication Management (c/o dizziness and dry mouth ); and Consult (referral hernia)    HPI      Restrictive lung disease - Reviewed PFT results. Patient states that her symptoms are currently stable because she has so much going on with other referrals as right now, she declines referral to pulmonology at this time and let me know if her symptoms flare up.    ESBL Klebsiella - pt has been Dr Zach RIOJAS discovered this in the recent past. Observing for now. Pt asxs. No dysuria, hematuria, fevers, chills.       Uncontrolled Htn - pt was recently started on amlo 2.5, and her legs began swelling right after she initiated therapy. Her Bp has been under better control however.      Hiatal hernia - horrible acid reflux sxs and chest pain that is not exertional and is worse when she lays down and wakes her up at night.         Outpatient Prescriptions Marked as Taking for the 5/7/18 encounter (Office Visit) with Scott Dave MD   Medication Sig Dispense Refill    levothyroxine (SYNTHROID) 150 MCG tablet Take 150 mcg by mouth 3 (three) times daily.         Past Medical History:   Diagnosis Date    Hypertension     Kidney disease, chronic, stage III (GFR 30-59 ml/min)     Obesity     Osteoporosis     Thyroid cancer        Family History   Problem Relation Age of Onset    Hypertension Mother     Heart disease Mother     Macular degeneration Mother     Hypertension Father     Cataracts Father     Glaucoma Father     Macular degeneration Father     Glaucoma Brother     Macular degeneration Brother     No Known Problems Sister     No Known Problems Maternal Aunt     No Known Problems Maternal Uncle     No Known Problems Paternal Aunt     No Known Problems Paternal Uncle     Macular degeneration Maternal Grandmother     Macular degeneration  Maternal Grandfather     Macular degeneration Paternal Grandmother     Glaucoma Paternal Grandfather     Macular degeneration Paternal Grandfather     Amblyopia Neg Hx     Blindness Neg Hx     Cancer Neg Hx     Diabetes Neg Hx     Retinal detachment Neg Hx     Strabismus Neg Hx     Stroke Neg Hx     Thyroid disease Neg Hx         reports that she has quit smoking. She has never used smokeless tobacco. She reports that she drinks alcohol. She reports that she does not use drugs.    Review of Systems   Constitutional: Positive for fatigue. Negative for chills and fever.   Respiratory: Positive for shortness of breath. Negative for cough and wheezing.    Gastrointestinal: Positive for abdominal pain and nausea. Negative for vomiting.        GERD       Objective:     Vitals:    05/07/18 1449   BP: 134/80   Pulse: 74   Resp: 20   Temp: 99.5 °F (37.5 °C)        Physical Exam   Constitutional: She appears well-developed. No distress.   HENT:   Head: Normocephalic and atraumatic.   Eyes: Conjunctivae are normal. Right eye exhibits no discharge. Left eye exhibits no discharge. No scleral icterus.   Cardiovascular: Normal rate, regular rhythm and normal heart sounds.  Exam reveals no gallop and no friction rub.    No murmur heard.  Pulmonary/Chest: Effort normal and breath sounds normal. No respiratory distress. She has no wheezes. She has no rales.   Musculoskeletal: She exhibits edema (2+ bilat LE).   Neurological: She is alert.   Skin: She is not diaphoretic.   Psychiatric: She has a normal mood and affect.   Vitals reviewed.      Assessment:       1. Restrictive lung disease    2. Gastroesophageal reflux disease, esophagitis presence not specified    3. Need for pneumococcal vaccination    4. Restrictive pattern present on pulmonary function testing    5. Hypertensive kidney disease with stage 4 chronic kidney disease    6. Hypertension, uncontrolled        Plan:       Luisana was seen today for pain, medication  management and consult.    Diagnoses and all orders for this visit:    Restrictive lung disease  Reviewed patient's results today. As above patient is not interested in pursuing further evaluation for this at this time because she is seeing several specialists including OB/GYN endocrinology bariatrics among others.  Patient will let me know if her current symptoms change at all.    Gastroesophageal reflux disease, esophagitis presence not specified  -     sucralfate (CARAFATE) 1 gram tablet; Take 1 tablet (1 g total) by mouth 3 (three) times daily. For acid reflux  Patient states this occurred is complicated by the fact that she is planning to have bariatric surgery 6 months.  Attempt to avoid surgery earlier than that, I will try treating her with Carafate. Patient will let me know this is successful. If not I will refer her to GI versus general surgery to discuss her options for bilateral hernia repair.     Need for pneumococcal vaccination  -     Pneumococcal Polysaccharide Vaccine (23 Valent) (SQ/IM)    Restrictive pattern present on pulmonary function testing  As above    Hypertensive kidney disease with stage 4 chronic kidney disease  Patient's blood pressure continues to be an issue. She was recently started on amlodipine 2.5 mg which I believe this contributed to her significant lower extremity edema. I discontinue this today and increased her metoprolol.    May try hydralazine next.    Hypertension, uncontrolled  -     metoprolol succinate (TOPROL-XL) 100 MG 24 hr tablet; Take 1 tablet (100 mg total) by mouth once daily. For high blood pressure            Follow-up in about 2 days (around 5/9/2018) for L shoulder pain, dizziness.        Pt verbalized understanding and agreed with our plan.

## 2018-05-08 ENCOUNTER — TELEPHONE (OUTPATIENT)
Dept: NEPHROLOGY | Facility: CLINIC | Age: 73
End: 2018-05-08

## 2018-05-10 ENCOUNTER — OFFICE VISIT (OUTPATIENT)
Dept: FAMILY MEDICINE | Facility: CLINIC | Age: 73
End: 2018-05-10
Payer: MEDICARE

## 2018-05-10 ENCOUNTER — TELEPHONE (OUTPATIENT)
Dept: FAMILY MEDICINE | Facility: CLINIC | Age: 73
End: 2018-05-10

## 2018-05-10 VITALS
BODY MASS INDEX: 40.26 KG/M2 | WEIGHT: 271.81 LBS | OXYGEN SATURATION: 96 % | HEART RATE: 63 BPM | DIASTOLIC BLOOD PRESSURE: 72 MMHG | SYSTOLIC BLOOD PRESSURE: 138 MMHG | TEMPERATURE: 99 F | HEIGHT: 69 IN | RESPIRATION RATE: 16 BRPM

## 2018-05-10 DIAGNOSIS — Z79.899 POLYPHARMACY: ICD-10-CM

## 2018-05-10 DIAGNOSIS — G47.33 OSA (OBSTRUCTIVE SLEEP APNEA): ICD-10-CM

## 2018-05-10 DIAGNOSIS — G47.9 SLEEP DISTURBANCE: ICD-10-CM

## 2018-05-10 DIAGNOSIS — J32.9 CHRONIC SINUSITIS, UNSPECIFIED LOCATION: Primary | ICD-10-CM

## 2018-05-10 DIAGNOSIS — R42 DIZZINESS: ICD-10-CM

## 2018-05-10 DIAGNOSIS — F19.90 MISUSE OF OVER-THE-COUNTER MEDICATIONS: ICD-10-CM

## 2018-05-10 DIAGNOSIS — R26.81 GAIT INSTABILITY: ICD-10-CM

## 2018-05-10 DIAGNOSIS — M25.512 ACUTE PAIN OF LEFT SHOULDER: ICD-10-CM

## 2018-05-10 PROCEDURE — 99999 PR PBB SHADOW E&M-EST. PATIENT-LVL IV: CPT | Mod: PBBFAC,,, | Performed by: FAMILY MEDICINE

## 2018-05-10 PROCEDURE — 99214 OFFICE O/P EST MOD 30 MIN: CPT | Mod: PBBFAC,PO | Performed by: FAMILY MEDICINE

## 2018-05-10 PROCEDURE — 99215 OFFICE O/P EST HI 40 MIN: CPT | Mod: 25,S$PBB,, | Performed by: FAMILY MEDICINE

## 2018-05-10 PROCEDURE — 20610 DRAIN/INJ JOINT/BURSA W/O US: CPT | Mod: PBBFAC,PO | Performed by: FAMILY MEDICINE

## 2018-05-10 RX ORDER — TRAZODONE HYDROCHLORIDE 300 MG/1
600 TABLET ORAL NIGHTLY
Qty: 60 TABLET | Refills: 3 | Status: SHIPPED | OUTPATIENT
Start: 2018-05-10 | End: 2019-01-01 | Stop reason: SDUPTHER

## 2018-05-10 RX ORDER — FLUTICASONE PROPIONATE 50 MCG
1 SPRAY, SUSPENSION (ML) NASAL 2 TIMES DAILY
Qty: 1 BOTTLE | Refills: 11 | Status: SHIPPED | OUTPATIENT
Start: 2018-05-10

## 2018-05-10 RX ORDER — AMLODIPINE BESYLATE 2.5 MG/1
2.5 TABLET ORAL DAILY
COMMUNITY
End: 2018-06-25

## 2018-05-10 RX ORDER — TRIAMCINOLONE ACETONIDE 40 MG/ML
40 INJECTION, SUSPENSION INTRA-ARTICULAR; INTRAMUSCULAR
Status: DISCONTINUED | OUTPATIENT
Start: 2018-05-10 | End: 2018-05-10 | Stop reason: HOSPADM

## 2018-05-10 RX ORDER — AZELASTINE 1 MG/ML
1 SPRAY, METERED NASAL 2 TIMES DAILY
Qty: 30 ML | Refills: 0 | Status: SHIPPED | OUTPATIENT
Start: 2018-05-10 | End: 2019-08-01

## 2018-05-10 RX ORDER — TRAZODONE HYDROCHLORIDE 300 MG/1
300 TABLET ORAL NIGHTLY
Qty: 60 TABLET | Refills: 3 | Status: SHIPPED | OUTPATIENT
Start: 2018-05-10 | End: 2018-05-10 | Stop reason: SDUPTHER

## 2018-05-10 RX ORDER — MULTIVITAMIN
1 TABLET ORAL DAILY
COMMUNITY
End: 2018-06-25

## 2018-05-10 RX ADMIN — TRIAMCINOLONE ACETONIDE 40 MG: 40 INJECTION, SUSPENSION INTRA-ARTICULAR; INTRAMUSCULAR at 04:05

## 2018-05-10 NOTE — PROCEDURES
Large Joint Aspiration/Injection  Date/Time: 5/10/2018 4:11 PM  Performed by: ASIA TOLEDO  Authorized by: ASIA TOLEDO     Consent Done?:  Yes (Verbal)  Indications:  Pain  Procedure site marked: Yes    Timeout: Prior to procedure the correct patient, procedure, and site was verified      Location:  Shoulder  Site:  L subacromial bursa  Prep: Patient was prepped and draped in usual sterile fashion    Needle size:  22 G  Approach:  Posterior  Medications:  40 mg triamcinolone acetonide 40 mg/mL  Patient tolerance:  Patient tolerated the procedure well with no immediate complications

## 2018-05-10 NOTE — PROGRESS NOTES
Subjective:       Patient ID: Luisana Chun is a 72 y.o. female.    Chief Complaint: Shoulder Pain (left shoulder pain follow up )    HPI    Sleep disturbance - pt is sleeping well on trazodone 600mg at night. 300mg was not effective.     She is pleased.     Nasal congestion - pt stopped flonase because it wasn't working. She is now using afrin int. With decent relief but is taking it multiple times per week.     Pt had many questions about dubious supplements.     KING - pt is not treating her sleep apnea -     L shoulder pain - pt has chronic L shoulder pain that has flared     Room spinning dizziness - worsening x 3 weeks a/w staggers, but has been able to catch herself thus far. She looses balance daily due to these attacks. Not positional, and can occur.           Outpatient Prescriptions Marked as Taking for the 5/10/18 encounter (Office Visit) with Scott Dave MD   Medication Sig Dispense Refill    amLODIPine (NORVASC) 2.5 MG tablet Take 2.5 mg by mouth once daily.      ARGININE, L-ARGININE, ORAL Take 1,000 mg by mouth once daily.      ascorbate calcium (VITAMIN C ORAL) Take 1,500 mg by mouth once daily.      CAFFEINE ORAL Take 100 mg by mouth as needed.      calcitRIOL (ROCALTROL) 0.5 MCG Cap TAKE ONE CAPSULE BY MOUTH ONCE DAILY 90 capsule 1    docosahexanoic acid/epa (FISH OIL ORAL) Take by mouth once daily.      ergocalciferol (ERGOCALCIFEROL) 50,000 unit Cap Take 1 capsule (50,000 Units total) by mouth once daily. 5 capsule 0    FERROUS SULFATE ORAL Take 28 mg by mouth once daily.      FLUoxetine (PROZAC) 40 MG capsule Take 2 capsules (80 mg total) by mouth once daily. 180 capsule 0    GUAIFENESIN ORAL Take 400 mg by mouth as needed.      metoprolol succinate (TOPROL-XL) 100 MG 24 hr tablet Take 1 tablet (100 mg total) by mouth once daily. For high blood pressure 90 tablet 3    multivitamin (ONE DAILY MULTIVITAMIN) per tablet Take 1 tablet by mouth once daily.      NIFEdipine  (PROCARDIA-XL) 30 MG (OSM) 24 hr tablet Take 1 tablet (30 mg total) by mouth once daily. 30 tablet 11    oxybutynin (DITROPAN-XL) 5 MG TR24 Take 1 tablet (5 mg total) by mouth once daily. 30 tablet 11    SELENIUM ORAL Take 100 mcg by mouth once daily.      THYROID ORAL Take by mouth once daily.      UNABLE TO FIND medication name: a&d3 take 5 capsule daily      UNABLE TO FIND medication name: Ultimate H.A Vitamin C and Magnesium - daily      UNABLE TO FIND medication name: Energy Tab Proprietary Blend - Daily      zinc (CHELATED ZINC) 50 mg Tab Take by mouth once daily.         Past Medical History:   Diagnosis Date    Hypertension     Kidney disease, chronic, stage III (GFR 30-59 ml/min)     Obesity     Osteoporosis     Thyroid cancer        Family History   Problem Relation Age of Onset    Hypertension Mother     Heart disease Mother     Macular degeneration Mother     Hypertension Father     Cataracts Father     Glaucoma Father     Macular degeneration Father     Glaucoma Brother     Macular degeneration Brother     No Known Problems Sister     No Known Problems Maternal Aunt     No Known Problems Maternal Uncle     No Known Problems Paternal Aunt     No Known Problems Paternal Uncle     Macular degeneration Maternal Grandmother     Macular degeneration Maternal Grandfather     Macular degeneration Paternal Grandmother     Glaucoma Paternal Grandfather     Macular degeneration Paternal Grandfather     Amblyopia Neg Hx     Blindness Neg Hx     Cancer Neg Hx     Diabetes Neg Hx     Retinal detachment Neg Hx     Strabismus Neg Hx     Stroke Neg Hx     Thyroid disease Neg Hx         reports that she has quit smoking. She has never used smokeless tobacco. She reports that she drinks alcohol. She reports that she does not use drugs.    Review of Systems   Constitutional: Negative for chills and fever.   Musculoskeletal: Positive for arthralgias and gait problem.   Neurological:  Positive for dizziness. Negative for seizures, syncope and light-headedness.       Objective:     Vitals:    05/10/18 1327   BP: 138/72   Pulse: 63   Resp: 16   Temp: 98.7 °F (37.1 °C)        Physical Exam   Constitutional: She appears well-developed. No distress.   HENT:   Head: Normocephalic and atraumatic.   Right Ear: Tympanic membrane normal. No middle ear effusion.   Left Ear: Tympanic membrane normal.  No middle ear effusion.   Eyes: Conjunctivae are normal. No scleral icterus. Right eye exhibits nystagmus. Right eye exhibits normal extraocular motion.   Pulmonary/Chest: Effort normal.   Neurological: She is alert.   Nl HI and TS test    Nystagmus to the R   Skin: She is not diaphoretic.   Psychiatric: She has a normal mood and affect. Her behavior is normal.   Vitals reviewed.      Assessment:       1. Chronic sinusitis, unspecified location    2. Dizziness    3. Sleep disturbance    4. KING (obstructive sleep apnea)    5. Acute pain of left shoulder    6. Polypharmacy        Plan:       Luisana was seen today for shoulder pain.    Diagnoses and all orders for this visit:    Chronic sinusitis, unspecified location  -     fluticasone (FLONASE) 50 mcg/actuation nasal spray; 1 spray (50 mcg total) by Each Nare route 2 (two) times daily.  -     azelastine (ASTELIN) 137 mcg (0.1 %) nasal spray; 1 spray (137 mcg total) by Nasal route 2 (two) times daily.  -     CT Sinuses with Contrast; Future  Refer back to Dr Jacobson for ENT eval    Dizziness  Refer to ENT - vertigo high on differential after HINTS suggests peripheral cause. Will resume Flonase and Astelin continue Xyzal and obtain CAT scan as above.    Sleep disturbance  -     traZODone (DESYREL) 300 MG tablet; Take 2 tablet (600 mg total) by mouth every evening.  Patient is aware that 600 doses very high and has not recommended. She states that 300 mg dose did not provide her any relief and 600 mg dose is working really well and she is very pleased. I asked  patient to try 450 mg and if no relief to resume 600 mg with the caveat that this dose is a little risky and could cause her some side effects. Patient to let me know if she feels that she is expecting any side effects    KING (obstructive sleep apnea)  Patient is not treating and will not treat her sleep apnea with CPAP machine because she feels that it may her sick last time she wore it. My hope is that patient qualifies with his bariatric surgery a few months and with significant weight loss as improvement in her obstruction.    Acute pain of left shoulder  Patient has left shoulder pain that is chronic but has acutely worsened over the last 2 weeks. We discussed the risks and benefits of a steroid injection and she decided to proceed with the injection today. She will notify me if she has any questions or changes in her symptoms.    Polypharmacy  I eliminated the supplements today that were either dangerous including several supplemental caffeine megadoses of vitamin A or D today.           Follow-up in about 2 weeks (around 5/24/2018) for dizziness.        Pt verbalized understanding and agreed with our plan.     At least 40 minutes were spent today with the patient in the office, which more than 50% of the time was spent on evaluation and counseling regarding The primary encounter diagnosis was Chronic sinusitis, unspecified location. Diagnoses of Dizziness, Sleep disturbance, KING (obstructive sleep apnea), Acute pain of left shoulder, and Polypharmacy were also pertinent to this visit..

## 2018-05-14 ENCOUNTER — TELEPHONE (OUTPATIENT)
Dept: FAMILY MEDICINE | Facility: CLINIC | Age: 73
End: 2018-05-14
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
## 2018-05-14 NOTE — TELEPHONE ENCOUNTER
Attempted to reach pt. Unable to leave message, placed call to Aparna with [facility] she reinforced the previous message regarding conditions in the home.Please advise

## 2018-05-16 RX ORDER — ERGOCALCIFEROL 1.25 MG/1
50000 CAPSULE ORAL
Qty: 12 CAPSULE | Refills: 1 | Status: SHIPPED | OUTPATIENT
Start: 2018-05-16 | End: 2018-09-27 | Stop reason: SDUPTHER

## 2018-06-01 ENCOUNTER — TELEPHONE (OUTPATIENT)
Dept: FAMILY MEDICINE | Facility: CLINIC | Age: 73
End: 2018-06-01

## 2018-06-01 DIAGNOSIS — R91.1 LUNG NODULE: Primary | ICD-10-CM

## 2018-06-01 NOTE — TELEPHONE ENCOUNTER
Please inform pt she is due to repeat Chest CT scan to assess nodule. This is the one year repeat. I have placed the order. She can call [phone] to schedule

## 2018-06-01 NOTE — TELEPHONE ENCOUNTER
----- Message from Kathleen Washington sent at 6/1/2018  9:00 AM CDT -----  Contact: Self  Pt returning call. [phone].

## 2018-06-01 NOTE — TELEPHONE ENCOUNTER
Attempt to contact patient to notify of below.No answer. Unable to leave message.Will send message through patient portal.   Daily Assessment

## 2018-06-01 NOTE — TELEPHONE ENCOUNTER
----- Message from Kathleen Washington sent at 6/1/2018  9:00 AM CDT -----  Contact: Self  Pt returning call. [phone].

## 2018-06-05 ENCOUNTER — TELEPHONE (OUTPATIENT)
Dept: NEPHROLOGY | Facility: CLINIC | Age: 73
End: 2018-06-05

## 2018-06-06 DIAGNOSIS — N39.0 RECURRENT UTI: ICD-10-CM

## 2018-06-06 DIAGNOSIS — I12.9 HYPERTENSIVE KIDNEY DISEASE WITH STAGE 4 CHRONIC KIDNEY DISEASE: Primary | ICD-10-CM

## 2018-06-06 DIAGNOSIS — N18.4 HYPERTENSIVE KIDNEY DISEASE WITH STAGE 4 CHRONIC KIDNEY DISEASE: Primary | ICD-10-CM

## 2018-06-07 ENCOUNTER — TELEPHONE (OUTPATIENT)
Dept: NEPHROLOGY | Facility: CLINIC | Age: 73
End: 2018-06-07

## 2018-06-13 ENCOUNTER — LAB VISIT (OUTPATIENT)
Dept: LAB | Facility: HOSPITAL | Age: 73
End: 2018-06-13
Attending: INTERNAL MEDICINE
Payer: MEDICARE

## 2018-06-13 DIAGNOSIS — I12.9 HYPERTENSIVE KIDNEY DISEASE WITH STAGE 4 CHRONIC KIDNEY DISEASE: ICD-10-CM

## 2018-06-13 DIAGNOSIS — N39.0 RECURRENT UTI: ICD-10-CM

## 2018-06-13 DIAGNOSIS — N18.4 HYPERTENSIVE KIDNEY DISEASE WITH STAGE 4 CHRONIC KIDNEY DISEASE: ICD-10-CM

## 2018-06-13 LAB
BACTERIA #/AREA URNS AUTO: ABNORMAL /HPF
BILIRUB UR QL STRIP: NEGATIVE
CLARITY UR REFRACT.AUTO: ABNORMAL
COLOR UR AUTO: YELLOW
CREAT UR-MCNC: 70 MG/DL
GLUCOSE UR QL STRIP: NEGATIVE
HGB UR QL STRIP: NEGATIVE
KETONES UR QL STRIP: NEGATIVE
LEUKOCYTE ESTERASE UR QL STRIP: ABNORMAL
MICROSCOPIC COMMENT: ABNORMAL
NITRITE UR QL STRIP: POSITIVE
PH UR STRIP: 5 [PH] (ref 5–8)
PROT UR QL STRIP: NEGATIVE
PROT UR-MCNC: 8 MG/DL
PROT/CREAT RATIO, UR: 0.11
RBC #/AREA URNS AUTO: 1 /HPF (ref 0–4)
SP GR UR STRIP: 1.01 (ref 1–1.03)
SQUAMOUS #/AREA URNS AUTO: 2 /HPF
URN SPEC COLLECT METH UR: ABNORMAL
UROBILINOGEN UR STRIP-ACNC: NEGATIVE EU/DL
WBC #/AREA URNS AUTO: 40 /HPF (ref 0–5)
WBC CLUMPS UR QL AUTO: ABNORMAL

## 2018-06-13 PROCEDURE — 87186 SC STD MICRODIL/AGAR DIL: CPT

## 2018-06-13 PROCEDURE — 81001 URINALYSIS AUTO W/SCOPE: CPT

## 2018-06-13 PROCEDURE — 87077 CULTURE AEROBIC IDENTIFY: CPT

## 2018-06-13 PROCEDURE — 84156 ASSAY OF PROTEIN URINE: CPT

## 2018-06-13 PROCEDURE — 87086 URINE CULTURE/COLONY COUNT: CPT

## 2018-06-13 PROCEDURE — 87088 URINE BACTERIA CULTURE: CPT

## 2018-06-15 ENCOUNTER — LAB VISIT (OUTPATIENT)
Dept: LAB | Facility: HOSPITAL | Age: 73
End: 2018-06-15
Attending: INTERNAL MEDICINE
Payer: MEDICARE

## 2018-06-15 DIAGNOSIS — N18.4 HYPERTENSIVE KIDNEY DISEASE WITH STAGE 4 CHRONIC KIDNEY DISEASE: ICD-10-CM

## 2018-06-15 DIAGNOSIS — I12.9 HYPERTENSIVE KIDNEY DISEASE WITH STAGE 4 CHRONIC KIDNEY DISEASE: ICD-10-CM

## 2018-06-15 LAB
ALBUMIN SERPL BCP-MCNC: 3.5 G/DL
ANION GAP SERPL CALC-SCNC: 8 MMOL/L
BACTERIA UR CULT: NORMAL
BASOPHILS # BLD AUTO: 0.01 K/UL
BASOPHILS NFR BLD: 0.1 %
BUN SERPL-MCNC: 33 MG/DL
CALCIUM SERPL-MCNC: 8.4 MG/DL
CHLORIDE SERPL-SCNC: 113 MMOL/L
CO2 SERPL-SCNC: 20 MMOL/L
CREAT SERPL-MCNC: 1.8 MG/DL
DIFFERENTIAL METHOD: ABNORMAL
EOSINOPHIL # BLD AUTO: 0.2 K/UL
EOSINOPHIL NFR BLD: 2.3 %
ERYTHROCYTE [DISTWIDTH] IN BLOOD BY AUTOMATED COUNT: 14.5 %
EST. GFR  (AFRICAN AMERICAN): 32 ML/MIN/1.73 M^2
EST. GFR  (NON AFRICAN AMERICAN): 28 ML/MIN/1.73 M^2
GLUCOSE SERPL-MCNC: 108 MG/DL
HCT VFR BLD AUTO: 34.2 %
HGB BLD-MCNC: 10.8 G/DL
LYMPHOCYTES # BLD AUTO: 1.3 K/UL
LYMPHOCYTES NFR BLD: 16.8 %
MCH RBC QN AUTO: 30.5 PG
MCHC RBC AUTO-ENTMCNC: 31.6 G/DL
MCV RBC AUTO: 97 FL
MONOCYTES # BLD AUTO: 0.7 K/UL
MONOCYTES NFR BLD: 8.5 %
NEUTROPHILS # BLD AUTO: 5.6 K/UL
NEUTROPHILS NFR BLD: 72 %
PHOSPHATE SERPL-MCNC: 3.3 MG/DL
PLATELET # BLD AUTO: 223 K/UL
PMV BLD AUTO: 10.8 FL
POTASSIUM SERPL-SCNC: 4 MMOL/L
PTH-INTACT SERPL-MCNC: 60.4 PG/ML
RBC # BLD AUTO: 3.54 M/UL
SODIUM SERPL-SCNC: 141 MMOL/L
WBC # BLD AUTO: 7.73 K/UL

## 2018-06-15 PROCEDURE — 85025 COMPLETE CBC W/AUTO DIFF WBC: CPT

## 2018-06-15 PROCEDURE — 36415 COLL VENOUS BLD VENIPUNCTURE: CPT

## 2018-06-15 PROCEDURE — 80069 RENAL FUNCTION PANEL: CPT

## 2018-06-15 PROCEDURE — 83970 ASSAY OF PARATHORMONE: CPT

## 2018-06-15 PROCEDURE — 82306 VITAMIN D 25 HYDROXY: CPT

## 2018-06-16 LAB — 25(OH)D3+25(OH)D2 SERPL-MCNC: 28 NG/ML

## 2018-06-18 ENCOUNTER — TELEPHONE (OUTPATIENT)
Dept: NEPHROLOGY | Facility: CLINIC | Age: 73
End: 2018-06-18

## 2018-06-18 DIAGNOSIS — R82.90 ABNORMAL URINALYSIS: Primary | ICD-10-CM

## 2018-06-18 NOTE — TELEPHONE ENCOUNTER
Called pt in response to the message I received from my office with regards to her recent labs. When I called she expressed she was surprised and was not expecting my phone call. I asked if she had any questions for me and she said no. Regardless I did briefly update her on her recent labs, creatinine basically staying at baseline 1.7 to 1.9 and most recent at 1.8. Urinalysis again is abnormal and has Klebsiella seen in culture again but she denies any symptoms of UTI. Given MDR nature of this bacteria and recurrence appearance in urine culture, will refer to ID for further input. Pt was made aware and agreed with this plan.

## 2018-06-19 ENCOUNTER — HOSPITAL ENCOUNTER (OUTPATIENT)
Dept: RADIOLOGY | Facility: HOSPITAL | Age: 73
Discharge: HOME | End: 2018-06-19
Attending: FAMILY MEDICINE
Payer: MEDICARE

## 2018-06-19 ENCOUNTER — HOSPITAL ENCOUNTER (OUTPATIENT)
Dept: RADIOLOGY | Facility: HOSPITAL | Age: 73
Discharge: HOME | End: 2018-06-19
Attending: UROLOGY
Payer: MEDICARE

## 2018-06-19 ENCOUNTER — HOSPITAL ENCOUNTER (OUTPATIENT)
Dept: RADIOLOGY | Facility: HOSPITAL | Age: 73
Discharge: HOME | End: 2018-06-19
Attending: PHYSICIAN ASSISTANT
Payer: MEDICARE

## 2018-06-19 DIAGNOSIS — N39.41 URGENCY INCONTINENCE: ICD-10-CM

## 2018-06-19 DIAGNOSIS — R91.1 LUNG NODULE: ICD-10-CM

## 2018-06-19 DIAGNOSIS — J32.9 CHRONIC SINUSITIS, UNSPECIFIED LOCATION: ICD-10-CM

## 2018-06-19 PROCEDURE — 71250 CT THORAX DX C-: CPT | Mod: TC

## 2018-06-19 PROCEDURE — 76770 US EXAM ABDO BACK WALL COMP: CPT | Mod: TC

## 2018-06-19 PROCEDURE — 71250 CT THORAX DX C-: CPT | Mod: 26,,, | Performed by: RADIOLOGY

## 2018-06-19 PROCEDURE — 76770 US EXAM ABDO BACK WALL COMP: CPT | Mod: 26,,, | Performed by: RADIOLOGY

## 2018-06-19 PROCEDURE — 70486 CT MAXILLOFACIAL W/O DYE: CPT | Mod: TC

## 2018-06-19 PROCEDURE — 70486 CT MAXILLOFACIAL W/O DYE: CPT | Mod: 26,,, | Performed by: RADIOLOGY

## 2018-06-25 ENCOUNTER — OFFICE VISIT (OUTPATIENT)
Dept: FAMILY MEDICINE | Facility: CLINIC | Age: 73
End: 2018-06-25
Payer: MEDICARE

## 2018-06-25 VITALS
HEART RATE: 76 BPM | WEIGHT: 268.5 LBS | HEIGHT: 69 IN | TEMPERATURE: 99 F | BODY MASS INDEX: 39.77 KG/M2 | SYSTOLIC BLOOD PRESSURE: 132 MMHG | RESPIRATION RATE: 14 BRPM | OXYGEN SATURATION: 95 % | DIASTOLIC BLOOD PRESSURE: 74 MMHG

## 2018-06-25 DIAGNOSIS — E66.01 MORBID OBESITY DUE TO EXCESS CALORIES: Primary | ICD-10-CM

## 2018-06-25 DIAGNOSIS — R91.1 LUNG NODULE: ICD-10-CM

## 2018-06-25 DIAGNOSIS — J32.9 SINUSITIS, UNSPECIFIED CHRONICITY, UNSPECIFIED LOCATION: ICD-10-CM

## 2018-06-25 DIAGNOSIS — R41.3 MEMORY CHANGE: ICD-10-CM

## 2018-06-25 PROCEDURE — 99213 OFFICE O/P EST LOW 20 MIN: CPT | Mod: S$PBB,,, | Performed by: PHYSICIAN ASSISTANT

## 2018-06-25 PROCEDURE — 99999 PR PBB SHADOW E&M-EST. PATIENT-LVL V: CPT | Mod: PBBFAC,,, | Performed by: PHYSICIAN ASSISTANT

## 2018-06-25 PROCEDURE — 99215 OFFICE O/P EST HI 40 MIN: CPT | Mod: PBBFAC,PO | Performed by: PHYSICIAN ASSISTANT

## 2018-06-25 RX ORDER — LEVOTHYROXINE SODIUM 200 UG/1
TABLET ORAL
COMMUNITY
Start: 2018-06-06

## 2018-06-25 NOTE — LETTER
June 25, 2018    Luisana Chun  [address] [place] [id number]             [facility] [address] [place] [address]  Phone: [phone]  Fax: [phone] Dr. Edmond,    Patient is cleared to begin assessments for possible bariatric surgery. Please call our office with any questions.     Marie Cadena PA-C

## 2018-06-25 NOTE — LETTER
June 25, 2018    Luisana Chun  [address] [place] [id number]             [facility] [address] [place] [address]  Phone: [phone]  Fax: [phone] To Whom It May Concern,    Ms. Chun is subjectively presenting with issues with memory of unknown diagnosis. Work up pending.          Marie Cadena PA-C

## 2018-06-26 ENCOUNTER — OFFICE VISIT (OUTPATIENT)
Dept: INFECTIOUS DISEASES | Facility: CLINIC | Age: 73
End: 2018-06-26
Payer: MEDICARE

## 2018-06-26 ENCOUNTER — OFFICE VISIT (OUTPATIENT)
Dept: UROLOGY | Facility: CLINIC | Age: 73
End: 2018-06-26
Payer: MEDICARE

## 2018-06-26 VITALS
HEART RATE: 62 BPM | DIASTOLIC BLOOD PRESSURE: 70 MMHG | WEIGHT: 271.38 LBS | HEIGHT: 69 IN | SYSTOLIC BLOOD PRESSURE: 130 MMHG | BODY MASS INDEX: 40.2 KG/M2

## 2018-06-26 VITALS
WEIGHT: 269.38 LBS | SYSTOLIC BLOOD PRESSURE: 124 MMHG | HEART RATE: 73 BPM | HEIGHT: 69 IN | BODY MASS INDEX: 39.9 KG/M2 | DIASTOLIC BLOOD PRESSURE: 55 MMHG | TEMPERATURE: 98 F

## 2018-06-26 DIAGNOSIS — R82.71 BACTERIURIA: Primary | ICD-10-CM

## 2018-06-26 DIAGNOSIS — N39.3 SUI (STRESS URINARY INCONTINENCE, FEMALE): ICD-10-CM

## 2018-06-26 DIAGNOSIS — R35.1 NOCTURIA: ICD-10-CM

## 2018-06-26 DIAGNOSIS — E66.01 MORBID OBESITY DUE TO EXCESS CALORIES: ICD-10-CM

## 2018-06-26 DIAGNOSIS — N39.46 MIXED STRESS AND URGE URINARY INCONTINENCE: Primary | ICD-10-CM

## 2018-06-26 DIAGNOSIS — R39.15 URINARY URGENCY: ICD-10-CM

## 2018-06-26 DIAGNOSIS — N18.30 HYPERTENSIVE KIDNEY DISEASE WITH STAGE 3 CHRONIC KIDNEY DISEASE: ICD-10-CM

## 2018-06-26 DIAGNOSIS — I12.9 HYPERTENSIVE KIDNEY DISEASE WITH STAGE 3 CHRONIC KIDNEY DISEASE: ICD-10-CM

## 2018-06-26 DIAGNOSIS — R39.89 SUSPECTED UTI: ICD-10-CM

## 2018-06-26 PROCEDURE — 81001 URINALYSIS AUTO W/SCOPE: CPT | Mod: PBBFAC | Performed by: NURSE PRACTITIONER

## 2018-06-26 PROCEDURE — 51798 US URINE CAPACITY MEASURE: CPT | Mod: PBBFAC | Performed by: NURSE PRACTITIONER

## 2018-06-26 PROCEDURE — 99213 OFFICE O/P EST LOW 20 MIN: CPT | Mod: PBBFAC,25,27 | Performed by: INTERNAL MEDICINE

## 2018-06-26 PROCEDURE — 99999 PR PBB SHADOW E&M-EST. PATIENT-LVL III: CPT | Mod: PBBFAC,,, | Performed by: INTERNAL MEDICINE

## 2018-06-26 PROCEDURE — 99203 OFFICE O/P NEW LOW 30 MIN: CPT | Mod: S$PBB,,, | Performed by: INTERNAL MEDICINE

## 2018-06-26 PROCEDURE — 99214 OFFICE O/P EST MOD 30 MIN: CPT | Mod: PBBFAC | Performed by: NURSE PRACTITIONER

## 2018-06-26 PROCEDURE — 99214 OFFICE O/P EST MOD 30 MIN: CPT | Mod: S$PBB,,, | Performed by: NURSE PRACTITIONER

## 2018-06-26 PROCEDURE — 99999 PR PBB SHADOW E&M-EST. PATIENT-LVL IV: CPT | Mod: PBBFAC,,, | Performed by: NURSE PRACTITIONER

## 2018-06-26 RX ORDER — OXYBUTYNIN CHLORIDE 10 MG/1
10 TABLET, EXTENDED RELEASE ORAL DAILY
Qty: 30 TABLET | Refills: 11 | Status: SHIPPED | OUTPATIENT
Start: 2018-06-26 | End: 2018-09-18

## 2018-06-26 NOTE — LETTER
June 27, 2018      Frank Soto MD  [address]  [facility] [address]           [facility]  [address]  [facility] [address]  Phone: [phone]  Fax: [phone]          Patient: Luisana Chun   MR Number: [id number]   YOB: 1945   Date of Visit: 6/26/2018       Dear Dr. Frank Soto:    Thank you for referring Luisana Chun to me for evaluation. Attached you will find relevant portions of my assessment and plan of care.    If you have questions, please do not hesitate to call me. I look forward to following Luisana Chun along with you.    Sincerely,    Bryanna Toro MD    Enclosure  CC:  No Recipients    If you would like to receive this communication electronically, please contact [email or web address] or [phone] to request more information on [facility] access.    For providers and/or their staff who would like to refer a patient to [facility], please contact us through our one-stop-shop provider referral line, [facility], at [phone].    If you feel you have received this communication in error or would no longer like to receive these types of communications, please e-mail [email or web address]

## 2018-06-26 NOTE — PROGRESS NOTES
Subjective:       Patient ID: Luisana Chun is a 72 y.o. female.    Chief Complaint: Results (discuss test results)    HPI: 73 yo female presents for CT results and letters.  Sinus CT showed sinus disease. Chest CT showed no change in pulmonary nodule. Pt also seeing new bariatic doc. Needs note stating Dr. Dave agrees with going through testing. Pt also struggling with memory and confusion. She cannot remember her meds and likely continues meds even if they have been discontinued. She also states this memory problem causes her to forget to pay bills. She has had no workup for her memory.     Review of Systems   Constitutional: Positive for unexpected weight change.   Psychiatric/Behavioral: Positive for confusion and decreased concentration.       Objective:      Physical Exam   Constitutional: She is oriented to person, place, and time. She appears well-developed. No distress.   obese   Neurological: She is alert and oriented to person, place, and time.   Skin: Skin is warm and dry. She is not diaphoretic.   Psychiatric: She has a normal mood and affect. Her behavior is normal.   Vitals reviewed.      Assessment:       1. Morbid obesity due to excess calories    2. Memory change    3. Sinusitis, unspecified chronicity, unspecified location    4. Lung nodule        Plan:         Luisana was seen today for results.    Diagnoses and all orders for this visit:    Morbid obesity due to excess calories  -  Letter written to new bariatrics doc per Dr. Dave    Memory change  -    Will need to be evaluated, unable to claim this is due to thyroid and causes her to miss paying bills    Sinusitis, unspecified chronicity, unspecified location  -   Continue nasal spray    Lung nodule  -   Stable, no further imaging

## 2018-06-26 NOTE — PROGRESS NOTES
Subjective:       Patient ID: Luisana Chun is a 72 y.o. female who is an established patient of Dr. Armstrong though new to me was last seen in this office 3/6/2018    Chief Complaint:   Chief Complaint   Patient presents with    Follow-up     follow up from ultrasound      Urinary Incontinence  Patient complains of urinary incontinence. This has been present for several years. She leaks urine with bending, coughing, lifting, laughing, sneezing, with urge, with a full bladder. Patient describes the symptoms as nocturia 4 times per night, urge to urinate with little or no warning and urine leakage with coughing/heavy physical activity. Factors associated with symptoms include none known. Evaluation to date includes none. Treatment to date includes none.    Patient treated with Augmentin for UTI (Klebsiella ESBL) in 3/2018. She was referred to ID by her nephrologist d/t abnormal urinalysis and UCx--+Klebsiella ESBL on 6/13/18. Patient seen by ID today who recommends treatment of UTI with IV antibiotics only when patient planned for invasive procedures and symptomatic (per patient report). No notes/records available to me at this time.    She was given a trial of Ditropan XL 5 mg during her initial visit with Dr. Armstrong. She is here today for follow up. Still with urinary urgency and leakage. She does not feel medication is working effectively. Denies dysuria, gross hematuria, ISAIAS, pelvic/flank pain or fever.    CA (6/19/18)-- normal, PVR--1.3 ml    PVR (bladder scan) today - 0 ml    ACTIVE MEDICAL ISSUES:  Patient Active Problem List   Diagnosis    Osteoporosis without pathological fracture    Menopausal state    Secondary hyperparathyroidism    Hypertension, uncontrolled    Obesity, Class II, BMI 35-39.9    Vitamin D deficiency    Normocytic anemia    Rosacea    Bilateral chronic knee pain    Sleep disturbance    KING (obstructive sleep apnea)    CKD (chronic kidney disease), stage IV     Hypertensive CKD (chronic kidney disease)    Primary hyperparathyroidism    Pedal edema    Abnormal urinalysis    Morbid obesity due to excess calories    Mild episode of recurrent major depressive disorder    Primary thyroid cancer    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  Review of patient's allergies indicates:   Allergen Reactions    Amlodipine      Ankle swelling     Current Outpatient Prescriptions   Medication Sig    azelastine (ASTELIN) 137 mcg (0.1 %) nasal spray 1 spray (137 mcg total) by Nasal route 2 (two) times daily.    docosahexanoic acid/epa (FISH OIL ORAL) Take by mouth once daily.    ergocalciferol (ERGOCALCIFEROL) 50,000 unit Cap Take 1 capsule (50,000 Units total) by mouth every 7 days.    FERROUS SULFATE ORAL Take 28 mg by mouth once daily.    FLUoxetine (PROZAC) 40 MG capsule Take 2 capsules (80 mg total) by mouth once daily.    fluticasone (FLONASE) 50 mcg/actuation nasal spray 1 spray (50 mcg total) by Each Nare route 2 (two) times daily.    GUAIFENESIN ORAL Take 400 mg by mouth as needed.    irbesartan (AVAPRO) 300 MG tablet TAKE ONE TABLET BY MOUTH IN THE EVENING    levocetirizine (XYZAL) 5 MG tablet     levothyroxine (SYNTHROID) 150 MCG tablet Take 150 mcg by mouth 3 (three) times daily.    metoprolol succinate (TOPROL-XL) 100 MG 24 hr tablet Take 1 tablet (100 mg total) by mouth once daily. For high blood pressure    NIFEdipine (PROCARDIA-XL) 30 MG (OSM) 24 hr tablet Take 1 tablet (30 mg total) by mouth once daily.    PHENYLEPH/MINERAL OIL/PETROLAT (PREPARATION H RECT) Place rectally once daily.    sodium bicarbonate 325 MG tablet Take 325 mg by mouth 4 (four) times daily.    sucralfate (CARAFATE) 1 gram tablet Take 1 tablet (1 g total) by mouth 3  "(three) times daily. For acid reflux    SYNTHROID 200 mcg tablet     traZODone (DESYREL) 300 MG tablet Take 2 tablets (600 mg total) by mouth every evening.    triamcinolone acetonide 0.1% (KENALOG) 0.1 % cream     zinc (CHELATED ZINC) 50 mg Tab Take by mouth once daily.    oxybutynin (DITROPAN-XL) 10 MG 24 hr tablet Take 1 tablet (10 mg total) by mouth once daily.     No current facility-administered medications for this visit.        Review of Systems   Constitutional: Negative for activity change, chills, fatigue, fever and unexpected weight change.   Eyes: Negative for discharge, redness and visual disturbance.   Respiratory: Negative for cough, shortness of breath and wheezing.    Cardiovascular: Negative for chest pain and leg swelling.   Gastrointestinal: Negative for abdominal distention, abdominal pain, constipation, diarrhea, nausea and vomiting.   Genitourinary: Positive for urgency. Negative for decreased urine volume, difficulty urinating, dysuria, flank pain, frequency, hematuria and pelvic pain.   Musculoskeletal: Negative for arthralgias, joint swelling and myalgias.   Skin: Negative for color change and rash.   Neurological: Negative for dizziness and light-headedness.   Psychiatric/Behavioral: Negative for behavioral problems and confusion. The patient is not nervous/anxious.        Objective:      Vitals:    06/26/18 1522   BP: 130/70   Pulse: 62   Weight: 123.1 kg (271 lb 6.2 oz)   Height: 5' 9" (1.753 m)     Physical Exam   Constitutional: She is oriented to person, place, and time. She appears well-developed.   HENT:   Head: Normocephalic and atraumatic.   Nose: Nose normal.   Eyes: Conjunctivae are normal. Right eye exhibits no discharge. Left eye exhibits no discharge.   Neck: Normal range of motion. Neck supple. No tracheal deviation present. No thyromegaly present.   Cardiovascular: Normal rate and regular rhythm.    Pulmonary/Chest: Effort normal. No respiratory distress. She has no " wheezes.   Abdominal: Soft. She exhibits no distension. There is no hepatosplenomegaly. There is no tenderness. There is no CVA tenderness. No hernia.   Genitourinary:   Genitourinary Comments: Patient declined exam   Musculoskeletal: Normal range of motion. She exhibits no edema.   Neurological: She is alert and oriented to person, place, and time.   Skin: Skin is warm and dry. No rash noted. No erythema.     Psychiatric: She has a normal mood and affect. Her behavior is normal. Judgment normal.       Urine dipstick shows ++LE, +Nitrite.      Narrative     EXAMINATION:  US RETROPERITONEAL COMPLETE    CLINICAL HISTORY:  Urge incontinence    TECHNIQUE:  Ultrasound of the kidneys and urinary bladder was performed including color flow and Doppler evaluation of the kidneys.    COMPARISON:  None.    FINDINGS:  Right kidney: The right kidney measures 11.3 x 5.8 x 5.5 cm. No cortical thinning. No loss of corticomedullary distinction. Resistive index measures 0.7.  No mass. No renal stone. No hydronephrosis.    Left kidney: The left kidney measures 9.5 by 5.1 x 5.1 cm. No cortical thinning. No loss of corticomedullary distinction. Resistive index measures 0.7.  No mass. No renal stone. No hydronephrosis.    Bladder prevoid residual is 160 cc and postvoid volume is 1.3 cc   Impression       No significant abnormality.      Electronically signed by: Derek Costa MD  Date: 06/19/2018  Time: 14:28     Reviewed with patient    Assessment:       1. Mixed stress and urge urinary incontinence    2. Urinary urgency    3. Nocturia    4. Suspected UTI          Plan:       1. Mixed stress and urge urinary incontinence   - Discussed difference of UUI and FABIENNE components. Reviewed etiology and workup of each.   - FABIENNE: Kegels, PFPT, pessary, bulking agent, MUS.   - UUI: Behavioral changes, PFPT, anticholinergics, mirabegron. Botox/InterStim for refractory UUI   -Ditropan XL 5 mg--not effective. Will increase to 10 mg  - POCT  urinalysis, dipstick or tablet reag  - oxybutynin (DITROPAN-XL) 10 MG 24 hr tablet; Take 1 tablet (10 mg total) by mouth once daily.  Dispense: 30 tablet; Refill: 11  -continue Kegel exercises  - CA--normal    2. Urinary urgency  -Acceptable PVR today  - POCT Bladder Scan  - oxybutynin (DITROPAN-XL) 10 MG 24 hr tablet; Take 1 tablet (10 mg total) by mouth once daily.  Dispense: 30 tablet; Refill: 11  -consider cysto/UDS in the future    3. Nocturia  - POCT urinalysis, dipstick or tablet reag    4. Suspected UTI  -patient asymptomatic  -recently evaluated by ID with recommendations to treat UTI ONLY before invasive procedures or if patient symptomatic (per patient report)            Follow-up in about 2 months (around 8/26/2018) for Follow up PVR.

## 2018-06-26 NOTE — PROGRESS NOTES
Subjective:      Patient ID: Luisana Chun is a 72 y.o. female.    Chief Complaint: Bacteriuria    History of Present Illness  72-year-old female with history of obesity, CKD stage III, presents for evaluation of bacteriuria.  Patient reports she was at her GYN office who requested a urinary specimen.  Patient denied having any urinary symptoms - no hematuria, dysuria, urinary urgency, difficulty urinating, fevers, CVA tenderness.  Urine culture returned with ESBL E. Coli - patient referred to ID for further evaluation.     Patient reports baseline stress urinary incontinence - incontinence when she sneezes, bends over. Patient gets urinary tract infections every 4 months - last in 2/2018 - at the time she was having 2 weeks of dysuria.  Urine culture with ESBL Klebsiella - treated with amox-clav with resolution of her symptoms.     Review of Systems   Constitution: Positive for chills, weakness, malaise/fatigue and weight gain. Negative for decreased appetite, fever, night sweats and weight loss.   HENT: Positive for congestion, hearing loss and tinnitus. Negative for ear pain, hoarse voice and sore throat.    Eyes: Positive for blurred vision. Negative for redness and visual disturbance.   Cardiovascular: Positive for leg swelling. Negative for chest pain and palpitations.   Respiratory: Positive for cough and shortness of breath. Negative for hemoptysis and sputum production.    Hematologic/Lymphatic: Negative for adenopathy. Bruises/bleeds easily.   Skin: Positive for dry skin and itching. Negative for rash and suspicious lesions.   Musculoskeletal: Positive for back pain, joint pain, myalgias and neck pain.   Gastrointestinal: Positive for diarrhea and heartburn. Negative for abdominal pain, constipation, nausea and vomiting.   Genitourinary: Positive for urgency. Negative for dysuria, flank pain, frequency, hematuria and hesitancy.   Neurological: Positive for dizziness, headaches, numbness and  "paresthesias.   Psychiatric/Behavioral: Positive for depression and memory loss. The patient has insomnia and is nervous/anxious.      Objective:   Physical Exam   Constitutional: She is oriented to person, place, and time. She appears well-developed and well-nourished. No distress.   HENT:   Head: Normocephalic and atraumatic.   Eyes: Conjunctivae and EOM are normal.   Neck: Normal range of motion. Neck supple.   Pulmonary/Chest: Effort normal. No respiratory distress.   Abdominal: Soft. She exhibits no distension.   Musculoskeletal: Normal range of motion. She exhibits no edema.   Neurological: She is alert and oriented to person, place, and time.   Skin: Skin is warm and dry. No rash noted. She is not diaphoretic. No erythema.   Psychiatric: She has a normal mood and affect. Her behavior is normal.   Vitals reviewed.     Klebsiella pneumoniae esbl     CULTURE, URINE     Amox/K Clav'ate 16/8  Intermediate     Amp/Sulbactam >16/8  Resistant     Ampicillin >16  Resistant     Cefazolin >16  Resistant     Cefepime >16  Resistant     Ceftriaxone >32  Resistant     Ciprofloxacin >2  Resistant     Ertapenem <=2 "><=2  Sensitive     Gentamicin <=4 "><=4  Sensitive     Meropenem <=4 "><=4  Sensitive     Nitrofurantoin >64  Resistant     Piperacillin/Tazo <=16 "><=16  Sensitive     Tetracycline >8  Resistant     Tobramycin 8  Intermediate     Trimeth/Sulfa >2/38  Resistant          Assessment:   72-year-old female  - Obesity  - CKD stage 3  - Stress urinary incontinence  - ESBL Klebsiella bacteriuria    Plan:   - No indication to treat bacteriuria unless patient has symptoms of UTI (hematuria, dysuria) or if plans for urological procedure  - Advised patient to increase fluid intake as well as empty bladder on regular basis     Bryanna Toro MD MPH  Infectious Diseases [facility]    "

## 2018-06-27 ENCOUNTER — OFFICE VISIT (OUTPATIENT)
Dept: NEPHROLOGY | Facility: CLINIC | Age: 73
End: 2018-06-27
Payer: MEDICARE

## 2018-06-27 VITALS
SYSTOLIC BLOOD PRESSURE: 120 MMHG | WEIGHT: 270.06 LBS | HEIGHT: 69 IN | BODY MASS INDEX: 40 KG/M2 | DIASTOLIC BLOOD PRESSURE: 60 MMHG

## 2018-06-27 DIAGNOSIS — E55.9 VITAMIN D DEFICIENCY: ICD-10-CM

## 2018-06-27 DIAGNOSIS — I12.9 HYPERTENSIVE KIDNEY DISEASE WITH STAGE 4 CHRONIC KIDNEY DISEASE: ICD-10-CM

## 2018-06-27 DIAGNOSIS — N18.4 CKD (CHRONIC KIDNEY DISEASE), STAGE IV: Primary | ICD-10-CM

## 2018-06-27 DIAGNOSIS — N18.4 HYPERTENSIVE KIDNEY DISEASE WITH STAGE 4 CHRONIC KIDNEY DISEASE: ICD-10-CM

## 2018-06-27 DIAGNOSIS — E87.20 METABOLIC ACIDOSIS: ICD-10-CM

## 2018-06-27 DIAGNOSIS — R82.90 ABNORMAL URINALYSIS: ICD-10-CM

## 2018-06-27 LAB
BILIRUB SERPL-MCNC: NORMAL MG/DL
BLOOD URINE, POC: NORMAL
COLOR, POC UA: YELLOW
GLUCOSE UR QL STRIP: NORMAL
KETONES UR QL STRIP: NORMAL
LEUKOCYTE ESTERASE URINE, POC: NORMAL
NITRITE, POC UA: NORMAL
PH, POC UA: 6
POC RESIDUAL URINE VOLUME: 0 ML (ref 0–100)
PROTEIN, POC: NORMAL
SPECIFIC GRAVITY, POC UA: 1000
UROBILINOGEN, POC UA: NORMAL

## 2018-06-27 PROCEDURE — 99213 OFFICE O/P EST LOW 20 MIN: CPT | Mod: PBBFAC | Performed by: INTERNAL MEDICINE

## 2018-06-27 PROCEDURE — 99215 OFFICE O/P EST HI 40 MIN: CPT | Mod: S$PBB,,, | Performed by: INTERNAL MEDICINE

## 2018-06-27 PROCEDURE — 99999 PR PBB SHADOW E&M-EST. PATIENT-LVL III: CPT | Mod: PBBFAC,,, | Performed by: INTERNAL MEDICINE

## 2018-06-28 NOTE — PROGRESS NOTES
Subjective:       Patient ID: Luisana Chun is a 72 y.o. White female who presents for follow up on  Chronic Kidney Disease    HPI     Ms. Chun is seen in nephrology clinic for follow up on CKD. She was last followed on 9/21/17, she established care with our clinic on 2/25/16.     Today she arrived in the clinic accompanied by her girlfriend who she introduced as her caretaker.     Interim, pt had Gyn evaluation on the [facility] for recurrent UTI/ pain. But was told she did not have any Gyn issue. She has prior recurrent episodes of UTI. Pre clinic labs showed ESBL in urine culture. Given this and given she being asymptomatic, she was referred to ID. Per ID, in the absence of any symptoms of UTI she should not be treated for this unless going for any urologic procedure. She did see urologist for various complaints. She did not have any significant PVR.     Pt states she had thyroidectomy and apparently partial parathyroid removal around 3 months ago at [facility].     Pt reports episodes of uncontrolled hypertension, edema of legs and recent changes to her antihypertensives by her PCP. She has apparently changed her PCP to outside of [facility]. She is advised to start HCTZ in addition to her other antihypertensive medicines including Avapro, metoprolol and Nifedipine. She has not started it yet and wants to know if it would be ok from kidney standpoint. I have advised her to have a follow up BMP in 1-2 weeks of starting HCTZ to monitor electrolytes like K, Na and BUN and to follow holding parameters for her multiple antihypertensives given her SBP running at 120 in the clinic. She describes however that at home recently her SBP was staying in 150-160 range.      She continues to feel tired. She denies dysuria/ vomiting. Diarrhea/ flank pain/ fever. She has BMI worsening to 39.    Originally she had stated she was visiting from [place] and had plans to go back there but apparently that has not been the case. She  reports she has a diagnosis of CKD and had established nephrology follow up in [place]. She reports she was told of CKD due to her chronic diarrhea?. Pt has hypertension for several years, with onset in her 20's. She denies any h/o diabetes. Pt reports h/o UTI. Prior records from [place] have not been received. She reports she has chronic diarrhea for several years but states that she had no work up done for this. She reports having leg swelling but she never had any abnormal proteinuria so etiology of leg swelling not clear.     She was enrolled in NICE study at [facility] and she had labs done from [facility] from 11/22/16. Per those labs her creatinine was 1.6 which is close to her baseline. Now she reports she has been out of that study. I have not received any communication from [facility] as to the findings of her participation into the study.     In 5/17 she had pre renal azotemia due to HCTZ use and not drinking enough water. Transiently she was taken off ARB and HCTZ back then.    Prior labs showed hep C and B screen is negative, and has normal protein electrophoresis pattern. On US she has 10.5 cm kidney size. There is decreased corticomedullary differentiation and decreased cortical thickness. There is no mass/ stone/ obstruction.     Renal Function:  Lab Results   Component Value Date     06/15/2018    GLU 81 01/31/2018     06/15/2018     01/31/2018    K 4.0 06/15/2018    K 4.1 01/31/2018     (H) 06/15/2018     (H) 01/31/2018    CO2 20 (L) 06/15/2018    CO2 19 (L) 01/31/2018    BUN 33 (H) 06/15/2018    BUN 23 01/31/2018    CALCIUM 8.4 (L) 06/15/2018    CALCIUM 8.9 01/31/2018    CREATININE 1.8 (H) 06/15/2018    CREATININE 1.4 01/31/2018    ALBUMIN 3.5 06/15/2018    ALBUMIN 3.7 01/31/2018    PHOS 3.3 06/15/2018    PHOS 2.7 10/23/2017    ESTGFRAFRICA 32 (A) 06/15/2018    ESTGFRAFRICA 43.3 (A) 01/31/2018    EGFRNONAA 28 (A) 06/15/2018    EGFRNONAA 37.6 (A) 01/31/2018       Urinalysis:  Lab  Results   Component Value Date    APPEARANCEUA Hazy (A) 06/13/2018    PHUR 6 06/27/2018    SPECGRAV 1,000 06/27/2018    PROTEINUA Negative 06/13/2018    GLUCUA Negative 06/13/2018    OCCULTUA Negative 06/13/2018    NITRITE pos 06/27/2018    LEUKOCYTESUR 2+ (A) 06/13/2018       Protein/Creatinine Ratio:  Lab Results   Component Value Date    PROTEINURINE 8 06/13/2018    CREATRANDUR 70.0 06/13/2018    UTPCR 0.11 06/13/2018       CBC:  Lab Results   Component Value Date    WBC 7.73 06/15/2018    HGB 10.8 (L) 06/15/2018    HCT 34.2 (L) 06/15/2018     PTH 60.4  Vit D 28    US kidneys 6/19/18  FINDINGS:  Right kidney: The right kidney measures 11.3 x 5.8 x 5.5 cm. No mass. No renal stone. No hydronephrosis.    Left kidney: The left kidney measures 9.5 by 5.1 x 5.1 cm. No mass. No renal stone. No hydronephrosis.    Bladder prevoid residual is 160 cc and postvoid volume is 1.3 cc   Impression       No significant abnormality.         Review of Systems   Constitutional: Positive for fatigue. Negative for activity change, appetite change, chills and fever.   HENT: Negative for sneezing and sore throat.    Eyes: Negative for discharge and redness.   Respiratory: Negative for cough, shortness of breath and wheezing.    Cardiovascular: Negative for chest pain and leg swelling.   Gastrointestinal: Negative for abdominal distention, abdominal pain, blood in stool, diarrhea, nausea and vomiting.   Endocrine: Negative for polydipsia and polyuria.   Genitourinary: Negative for difficulty urinating, dysuria, flank pain, frequency and hematuria.   Musculoskeletal: Negative for gait problem and joint swelling.   Skin: Negative for pallor and rash.   Neurological: Negative for dizziness, light-headedness and headaches.   Psychiatric/Behavioral: Negative for behavioral problems. The patient is not nervous/anxious.        Objective:      Physical Exam   Constitutional: She is oriented to person, place, and time. She appears well-developed.  No distress.   Neck: Neck supple.   Cardiovascular: Normal rate, regular rhythm and normal heart sounds.    No murmur heard.  Pulmonary/Chest: Effort normal and breath sounds normal. No respiratory distress. She has no wheezes. She has no rales.   Abdominal: Soft. There is no tenderness.   Obesity+   Musculoskeletal: She exhibits edema (pedal edema ).   Neurological: She is alert and oriented to person, place, and time.   Skin: Skin is warm and dry.   Psychiatric: She has a normal mood and affect. Her behavior is normal.   Nursing note and vitals reviewed.      Assessment:       1. CKD (chronic kidney disease), stage IV    2. Hypertensive kidney disease with stage 4 chronic kidney disease    3. Vitamin D deficiency    4. Metabolic acidosis    5. Abnormal urinalysis        Plan:     Ms. Chun has longstanding hypertension, chronic diarrhea, NSAID use, she is here for nephrology visit for CKD. She is from [place] where she had an established renal follow up. She had labs from PCP office from 12/15 which showed creatinine of around 1.3, eGFR of 40, K 5, bicarbonate 17 to 20. Her labs from [place] are not available for review but pt reports her creatinine and GFR are somewhat similar to these results. Her CKD III which has now progressed to IV is likely due to longstanding hypertension and NSAID use. We discussed about this in detail. CKD staging, potential risk of decline in renal function was discussed with her.     She has had multiple episodes of CRISTEL on CKD in different context including pyelonephritis/ UTI, over diuresis/ continued ARB use while volume depleted etc.     Defer further antihypertensive management to her PCP. For long term she would benefit by using RAAS blockade to slow down progression of her CKD as long as she does not develop CRISTEL and hyperkalemia. This was explained to her in detail. She already had CKD known to her for the past few years. Her serologic work sarai has been negative. As such I do not see  a role in offering her invasive work up like percutaneous kidney biopsy. Especially her most recent episode of CRISTEL was likely due to volume depletion from diuretic causing pre renal azotemia.     Over time she does have progression of CKD with episodes of CRISTEL and partial recovery from such.    New PCP at [facility]   Fredi Herron [phone] (fax)  [phone] phone  Attempt to fax office note to Dr. Herron per patient request.    Follow with endocrine Dr. Lennon at  as s/p thyroidectomy and partial parathyroidectomy?    Being started on HCTZ for leg swelling by her PCP. Will need BMP in 1-2 weeks.    Appreciate ID input about ESBL in urine. No antibiotic for abnormal urine culture unless symptoms of UTI or going for urologic procedure.    Stressed to her to follow Low salt diet. Follow with PCP for hypertension control. Large variations per pt but avoiding hypotension would be crucial as she has been on more than 3 agents. Hypotension can cause CRISTEL/ dizziness. Pt counseled.    Weight loss by calorie control will be beneficial. On the other hand ongoing severe obesity can lead to progression of CKD.    Continue Sodium bicarbonate for correction of metabolic acidosis, continue to monitor labs while on treatment    Trend H/H but anemia less likely to be entirely from CKD, pt to follow with her other providers to look into non-renal causes of anemia     Etiology of edema not clear. No significant proteinuria. If worsens then consider cardiac evaluation or circulatory problems.    Continue Avapro for RAAS blockade     Additional Plan:  - avoid NSAID use ever, discussed with her, printed NSAID medication list was already given to her, have stressed to avoid it due to risk of progression of CKD  - have advised to stop multivitamin tablet as it can have K, Ca, vit D and runs risk of electrolyte disturbance as a result of such preparations   - continue Ergocalciferol and trend vit D, PTH, corrected Ca, restart calcitriol  if PTH > 200  - strict low salt diet and low K diet  - advise periodic monitoring of renal labs for electrolytes, acid base status, to rule out progression of CKD  - goal BP less than 130-140/80, advise home BP monitoring  - continue to follow with PCP for anemia management, chronic fatigue, leg edema.   - explained to her that at present she does not need protein or phos restriction but does need low salt and low K diet  - continue sodium bicarbonate for correction of metabolic acidosis  - CKD staging and potential risk for progression of CKD and gradual progression of CKD d/w her and her girlfriend       RTC 3  months with pre clinic labs  Plan d/w pt and her girlfriend in detail including diet, labs, follow up.

## 2018-06-30 ENCOUNTER — EXTERNAL CHRONIC CARE MANAGEMENT (OUTPATIENT)
Dept: PRIMARY CARE CLINIC | Facility: CLINIC | Age: 73
End: 2018-06-30
Payer: MEDICARE

## 2018-06-30 PROCEDURE — 99490 CHRNC CARE MGMT STAFF 1ST 20: CPT | Mod: PBBFAC,PO | Performed by: FAMILY MEDICINE

## 2018-06-30 PROCEDURE — 99490 CHRNC CARE MGMT STAFF 1ST 20: CPT | Mod: S$PBB,,, | Performed by: FAMILY MEDICINE

## 2018-07-03 DIAGNOSIS — R21 RASH: ICD-10-CM

## 2018-07-03 RX ORDER — TRIAMCINOLONE ACETONIDE 1 MG/G
CREAM TOPICAL
Qty: 45 G | Refills: 1 | Status: SHIPPED | OUTPATIENT
Start: 2018-07-03

## 2018-07-11 ENCOUNTER — TELEPHONE (OUTPATIENT)
Dept: NEPHROLOGY | Facility: CLINIC | Age: 73
End: 2018-07-11

## 2018-07-16 ENCOUNTER — OFFICE VISIT (OUTPATIENT)
Dept: CARDIOLOGY | Facility: CLINIC | Age: 73
End: 2018-07-16
Payer: MEDICARE

## 2018-07-16 VITALS
DIASTOLIC BLOOD PRESSURE: 64 MMHG | WEIGHT: 259.06 LBS | BODY MASS INDEX: 38.25 KG/M2 | OXYGEN SATURATION: 95 % | HEART RATE: 74 BPM | SYSTOLIC BLOOD PRESSURE: 122 MMHG

## 2018-07-16 DIAGNOSIS — I25.6 SILENT MYOCARDIAL ISCHEMIA: ICD-10-CM

## 2018-07-16 DIAGNOSIS — E66.9 OBESITY, CLASS II, BMI 35-39.9: ICD-10-CM

## 2018-07-16 DIAGNOSIS — R06.09 DOE (DYSPNEA ON EXERTION): ICD-10-CM

## 2018-07-16 DIAGNOSIS — R60.0 PEDAL EDEMA: Primary | ICD-10-CM

## 2018-07-16 DIAGNOSIS — N18.4 CKD (CHRONIC KIDNEY DISEASE), STAGE IV: ICD-10-CM

## 2018-07-16 PROCEDURE — 99213 OFFICE O/P EST LOW 20 MIN: CPT | Mod: PBBFAC | Performed by: INTERNAL MEDICINE

## 2018-07-16 PROCEDURE — 99999 PR PBB SHADOW E&M-EST. PATIENT-LVL III: CPT | Mod: PBBFAC,,, | Performed by: INTERNAL MEDICINE

## 2018-07-16 PROCEDURE — 99204 OFFICE O/P NEW MOD 45 MIN: CPT | Mod: S$PBB,,, | Performed by: INTERNAL MEDICINE

## 2018-07-16 NOTE — PROGRESS NOTES
CARDIOVASCULAR CONSULTATION    REASON FOR CONSULT:   Luisana Chun is a 72 y.o. female who presents for eval of LE edema and PEOPLES.    PCP: Gopal  HISTORY OF PRESENT ILLNESS:   The patient is a pleasant 72-year-old  woman self-referred for evaluation of lower extremity edema.  She is accompanied by her significant other.  She describes progressive lower extremity swelling over the past several weeks.  This appears to have gotten better after she discontinued nifedipine common is essentially resolved on my examination today.  Per the patient, her legs were much more swollen previously, but she took a double dose of her hydrochlorothiazide.  She denies any overt angina, but does describe dyspnea on exertion over the past 6 months.  She does not report any recent stress testing.  She has had no palpitations, lightheadedness, dizziness, or syncope.  There has been no PND, orthopnea.  She denies melena, hematuria, or claudicant symptoms.  The patient is a somewhat difficult historian, and much of the history is provided by the patient's girlfriend.    Family history appears to be negative for premature CAD in first-degree relatives.    The patient is a distant ex-smoker.    CARDIOVASCULAR HISTORY:   none    PAST MEDICAL HISTORY:     Past Medical History:   Diagnosis Date    Hypertension     Kidney disease, chronic, stage III (GFR 30-59 ml/min)     Obesity     Osteoporosis     Thyroid cancer        PAST SURGICAL HISTORY:     Past Surgical History:   Procedure Laterality Date    APPENDECTOMY      BACK SURGERY  february 2016    COLONOSCOPY      ESOPHAGOGASTRODUODENOSCOPY      KNEE SURGERY Bilateral     left toe Left     TONSILLECTOMY      tumor biopsy thyroid         ALLERGIES AND MEDICATION:     Review of patient's allergies indicates:   Allergen Reactions    Amlodipine      Ankle swelling     Previous Medications    AZELASTINE (ASTELIN) 137 MCG (0.1 %) NASAL SPRAY    1 spray (137 mcg total) by  Nasal route 2 (two) times daily.    DOCOSAHEXANOIC ACID/EPA (FISH OIL ORAL)    Take by mouth once daily.    ERGOCALCIFEROL (ERGOCALCIFEROL) 50,000 UNIT CAP    Take 1 capsule (50,000 Units total) by mouth every 7 days.    FERROUS SULFATE ORAL    Take 28 mg by mouth once daily.    FLUOXETINE (PROZAC) 40 MG CAPSULE    Take 2 capsules (80 mg total) by mouth once daily.    FLUTICASONE (FLONASE) 50 MCG/ACTUATION NASAL SPRAY    1 spray (50 mcg total) by Each Nare route 2 (two) times daily.    GUAIFENESIN ORAL    Take 400 mg by mouth as needed.    IRBESARTAN (AVAPRO) 300 MG TABLET    TAKE ONE TABLET BY MOUTH IN THE EVENING    LEVOCETIRIZINE (XYZAL) 5 MG TABLET        LEVOTHYROXINE (SYNTHROID) 150 MCG TABLET    Take 150 mcg by mouth 3 (three) times daily.    METOPROLOL SUCCINATE (TOPROL-XL) 100 MG 24 HR TABLET    Take 1 tablet (100 mg total) by mouth once daily. For high blood pressure    NIFEDIPINE (PROCARDIA-XL) 30 MG (OSM) 24 HR TABLET    Take 1 tablet (30 mg total) by mouth once daily.    OXYBUTYNIN (DITROPAN-XL) 10 MG 24 HR TABLET    Take 1 tablet (10 mg total) by mouth once daily.    PHENYLEPH/MINERAL OIL/PETROLAT (PREPARATION H RECT)    Place rectally once daily.    SODIUM BICARBONATE 325 MG TABLET    Take 325 mg by mouth 4 (four) times daily.    SUCRALFATE (CARAFATE) 1 GRAM TABLET    Take 1 tablet (1 g total) by mouth 3 (three) times daily. For acid reflux    SYNTHROID 200 MCG TABLET        TRAZODONE (DESYREL) 300 MG TABLET    Take 2 tablets (600 mg total) by mouth every evening.    TRIAMCINOLONE ACETONIDE 0.1% (KENALOG) 0.1 % CREAM        TRIAMCINOLONE ACETONIDE 0.1% (KENALOG) 0.1 % CREAM    APPLY TOPICALLY 2 TIMES DAILY    ZINC (CHELATED ZINC) 50 MG TAB    Take by mouth once daily.       SOCIAL HISTORY:     Social History     Social History    Marital status:      Spouse name: N/A    Number of children: N/A    Years of education: N/A     Occupational History    Not on file.     Social History Main  Topics    Smoking status: Former Smoker    Smokeless tobacco: Never Used    Alcohol use 0.0 oz/week      Comment: social    Drug use: No    Sexual activity: Not Currently     Partners: Male      Comment: 5/4/17 single     Other Topics Concern    Not on file     Social History Narrative    No narrative on file       FAMILY HISTORY:     Family History   Problem Relation Age of Onset    Hypertension Mother     Heart disease Mother     Macular degeneration Mother     Hypertension Father     Cataracts Father     Glaucoma Father     Macular degeneration Father     Glaucoma Brother     Macular degeneration Brother     No Known Problems Sister     No Known Problems Maternal Aunt     No Known Problems Maternal Uncle     No Known Problems Paternal Aunt     No Known Problems Paternal Uncle     Macular degeneration Maternal Grandmother     Macular degeneration Maternal Grandfather     Macular degeneration Paternal Grandmother     Glaucoma Paternal Grandfather     Macular degeneration Paternal Grandfather     Amblyopia Neg Hx     Blindness Neg Hx     Cancer Neg Hx     Diabetes Neg Hx     Retinal detachment Neg Hx     Strabismus Neg Hx     Stroke Neg Hx     Thyroid disease Neg Hx        REVIEW OF SYSTEMS:   Review of Systems   Constitutional: Positive for malaise/fatigue. Negative for chills, diaphoresis and fever.   HENT: Negative for nosebleeds.    Eyes: Negative for blurred vision, double vision and photophobia.   Respiratory: Positive for shortness of breath. Negative for hemoptysis and wheezing.    Cardiovascular: Positive for leg swelling. Negative for palpitations, orthopnea, claudication and PND.   Gastrointestinal: Negative for abdominal pain, blood in stool, heartburn, melena, nausea and vomiting.   Genitourinary: Negative for flank pain and hematuria.   Musculoskeletal: Negative for falls, myalgias and neck pain.   Skin: Negative for rash.   Neurological: Positive for weakness.  "Negative for dizziness, seizures, loss of consciousness and headaches.   Endo/Heme/Allergies: Negative for polydipsia. Does not bruise/bleed easily.   Psychiatric/Behavioral: Negative for depression and memory loss. The patient is not nervous/anxious.        PHYSICAL EXAM:     Vitals:    07/16/18 1347   BP: 122/64   Pulse: 74   Resp: (P) 15    Body mass index is 38.25 kg/m² (pended).  Weight: 117.5 kg (259 lb 0.7 oz)   Height: (P) 5' 9" (175.3 cm)     Physical Exam   Constitutional: She is oriented to person, place, and time. She appears well-developed and well-nourished. She is cooperative.  Non-toxic appearance. No distress.   HENT:   Head: Normocephalic and atraumatic.   Eyes: Conjunctivae and EOM are normal. Pupils are equal, round, and reactive to light. No scleral icterus.   Neck: Trachea normal and normal range of motion. Neck supple. Normal carotid pulses and no JVD present. Carotid bruit is not present. No neck rigidity. No tracheal deviation and no edema present. No thyromegaly present.   Cardiovascular: Normal rate, regular rhythm, S1 normal and S2 normal.  PMI is not displaced.  Exam reveals distant heart sounds. Exam reveals no gallop and no friction rub.    No murmur heard.  Pulses:       Carotid pulses are 2+ on the right side, and 2+ on the left side.  Pulmonary/Chest: Effort normal and breath sounds normal. No respiratory distress. She has no wheezes. She has no rales. She exhibits no tenderness.   Abdominal: Soft. She exhibits no distension. There is no hepatosplenomegaly.   obese   Musculoskeletal: She exhibits no edema or tenderness.   Feet:   Right Foot:   Skin Integrity: Negative for ulcer.   Left Foot:   Skin Integrity: Negative for ulcer.   Neurological: She is alert and oriented to person, place, and time. No cranial nerve deficit.   Skin: Skin is warm and dry. No rash noted. No erythema.   Psychiatric: She has a normal mood and affect. Her speech is normal and behavior is normal.   Vitals " reviewed.      DATA:   EKG: (personally reviewed tracing)  6/28/17 SR 74, PACs    Laboratory:  CBC:    Recent Labs  Lab 09/29/17  1503 10/23/17  1127 06/15/18  1714   WHITE BLOOD CELL COUNT 8.43 6.97 7.73   HEMOGLOBIN 10.3 L 11.3 L 10.8 L   HEMATOCRIT 32.9 L 35.5 L 34.2 L   PLATELETS 251 250 223       CHEMISTRIES:    Recent Labs  Lab 10/23/17  1127 01/08/18  1527 01/31/18  1518 06/15/18  1714   GLUCOSE 111 H  --  81 108   SODIUM 136  --  141 141   POTASSIUM 4.4  --  4.1 4.0   BUN BLD 30 H  --  23 33 H   CREATININE 1.7 H  --  1.4 1.8 H   EGFR IF  34.2 A  --  43.3 A 32 A   EGFR IF NON- 29.7 A  --  37.6 A 28 A   CALCIUM 9.5 9.3 8.9 8.4 L       CARDIAC BIOMARKERS:        COAGS:    Recent Labs  Lab 06/10/16  0919   INR 1.0       LIPIDS/LFTS:    Recent Labs  Lab 06/30/16  1157 01/13/17  1052 06/30/17  1503 01/31/18  1518   CHOLESTEROL  --  159  --   --    TRIGLYCERIDES  --  113  --   --    HDL  --  31 L  --   --    LDL CHOLESTEROL  --  105.4  --   --    NON-HDL CHOLESTEROL  --  128  --   --    AST 23  --  20 19   ALT 28  --  26 20     Lab Results   Component Value Date    TSH 3.210 01/08/2018         Cardiovascular Testing:  Echo 4/17/18    1 - Normal left ventricular systolic function (EF 55-60%).     2 - Concentric hypertrophy.     3 - Mild left atrial enlargement.     4 - Trivial mitral regurgitation.     5 - Trivial tricuspid regurgitation.     6 - Trivial pulmonic regurgitation.     ASSESSMENT:   # LE edema, episodic but improved since stopping nifedipine, likely multifactorial venous htn  # PEOPLES, progressive  # HTN, controlled  # CKD4  # BMI 38    PLAN:   Cont med rx  Rosamaria MPI (unable to exercise)  LE venous US  Venous compression stockings  RTC 1 month    Abraham Reyes MD, FACC

## 2018-07-31 ENCOUNTER — EXTERNAL CHRONIC CARE MANAGEMENT (OUTPATIENT)
Dept: PRIMARY CARE CLINIC | Facility: CLINIC | Age: 73
End: 2018-07-31
Payer: MEDICARE

## 2018-07-31 PROCEDURE — 99490 CHRNC CARE MGMT STAFF 1ST 20: CPT | Mod: S$PBB,,, | Performed by: FAMILY MEDICINE

## 2018-07-31 PROCEDURE — 99490 CHRNC CARE MGMT STAFF 1ST 20: CPT | Mod: PBBFAC,PO | Performed by: FAMILY MEDICINE

## 2018-08-01 DIAGNOSIS — K21.9 GASTROESOPHAGEAL REFLUX DISEASE, ESOPHAGITIS PRESENCE NOT SPECIFIED: ICD-10-CM

## 2018-08-01 RX ORDER — SUCRALFATE 1 G/1
TABLET ORAL
Qty: 90 TABLET | Refills: 1 | Status: SHIPPED | OUTPATIENT
Start: 2018-08-01 | End: 2018-11-07

## 2018-08-03 ENCOUNTER — TELEPHONE (OUTPATIENT)
Dept: RESEARCH | Facility: HOSPITAL | Age: 73
End: 2018-08-03

## 2018-08-21 ENCOUNTER — TELEPHONE (OUTPATIENT)
Dept: FAMILY MEDICINE | Facility: CLINIC | Age: 73
End: 2018-08-21

## 2018-08-21 NOTE — TELEPHONE ENCOUNTER
----- Message from Kathleen Washington sent at 8/21/2018 10:59 AM CDT -----  Contact: Self   Pt requesting a referral to see a cardiologist. [phone].

## 2018-08-21 NOTE — TELEPHONE ENCOUNTER
Called to see if pt requesting a different cardiologist; states no, she forgot name of cardiologist she saw last; informed her Dr Abraham Reyes; pt verbalized understanding

## 2018-08-31 ENCOUNTER — EXTERNAL CHRONIC CARE MANAGEMENT (OUTPATIENT)
Dept: PRIMARY CARE CLINIC | Facility: CLINIC | Age: 73
End: 2018-08-31
Payer: MEDICARE

## 2018-08-31 PROCEDURE — 99490 CHRNC CARE MGMT STAFF 1ST 20: CPT | Mod: PBBFAC,PO | Performed by: FAMILY MEDICINE

## 2018-08-31 PROCEDURE — 99490 CHRNC CARE MGMT STAFF 1ST 20: CPT | Mod: S$PBB,,, | Performed by: FAMILY MEDICINE

## 2018-09-12 ENCOUNTER — TELEPHONE (OUTPATIENT)
Dept: RESEARCH | Facility: HOSPITAL | Age: 73
End: 2018-09-12

## 2018-09-14 ENCOUNTER — LAB VISIT (OUTPATIENT)
Dept: LAB | Facility: HOSPITAL | Age: 73
End: 2018-09-14
Attending: INTERNAL MEDICINE
Payer: MEDICARE

## 2018-09-14 DIAGNOSIS — N18.4 CKD (CHRONIC KIDNEY DISEASE), STAGE IV: ICD-10-CM

## 2018-09-14 LAB
ALBUMIN SERPL BCP-MCNC: 3.6 G/DL
ANION GAP SERPL CALC-SCNC: 11 MMOL/L
ANION GAP SERPL CALC-SCNC: 11 MMOL/L
BASOPHILS # BLD AUTO: 0.03 K/UL
BASOPHILS NFR BLD: 0.4 %
BUN SERPL-MCNC: 33 MG/DL
BUN SERPL-MCNC: 33 MG/DL
CALCIUM SERPL-MCNC: 8.2 MG/DL
CALCIUM SERPL-MCNC: 8.2 MG/DL
CHLORIDE SERPL-SCNC: 110 MMOL/L
CHLORIDE SERPL-SCNC: 110 MMOL/L
CO2 SERPL-SCNC: 19 MMOL/L
CO2 SERPL-SCNC: 19 MMOL/L
CREAT SERPL-MCNC: 1.9 MG/DL
CREAT SERPL-MCNC: 1.9 MG/DL
DIFFERENTIAL METHOD: ABNORMAL
EOSINOPHIL # BLD AUTO: 0.3 K/UL
EOSINOPHIL NFR BLD: 4.6 %
ERYTHROCYTE [DISTWIDTH] IN BLOOD BY AUTOMATED COUNT: 15.1 %
EST. GFR  (AFRICAN AMERICAN): 30 ML/MIN/1.73 M^2
EST. GFR  (AFRICAN AMERICAN): 30 ML/MIN/1.73 M^2
EST. GFR  (NON AFRICAN AMERICAN): 26 ML/MIN/1.73 M^2
EST. GFR  (NON AFRICAN AMERICAN): 26 ML/MIN/1.73 M^2
GLUCOSE SERPL-MCNC: 89 MG/DL
GLUCOSE SERPL-MCNC: 89 MG/DL
HCT VFR BLD AUTO: 33.7 %
HGB BLD-MCNC: 10.4 G/DL
LYMPHOCYTES # BLD AUTO: 1.7 K/UL
LYMPHOCYTES NFR BLD: 23.6 %
MCH RBC QN AUTO: 29.6 PG
MCHC RBC AUTO-ENTMCNC: 30.9 G/DL
MCV RBC AUTO: 96 FL
MONOCYTES # BLD AUTO: 0.8 K/UL
MONOCYTES NFR BLD: 11 %
NEUTROPHILS # BLD AUTO: 4.2 K/UL
NEUTROPHILS NFR BLD: 60.1 %
PHOSPHATE SERPL-MCNC: 2.8 MG/DL
PLATELET # BLD AUTO: 292 K/UL
PMV BLD AUTO: 10 FL
POTASSIUM SERPL-SCNC: 4.4 MMOL/L
POTASSIUM SERPL-SCNC: 4.4 MMOL/L
PTH-INTACT SERPL-MCNC: 83.2 PG/ML
RBC # BLD AUTO: 3.51 M/UL
SODIUM SERPL-SCNC: 140 MMOL/L
SODIUM SERPL-SCNC: 140 MMOL/L
WBC # BLD AUTO: 7 K/UL

## 2018-09-14 PROCEDURE — 36415 COLL VENOUS BLD VENIPUNCTURE: CPT

## 2018-09-14 PROCEDURE — 85025 COMPLETE CBC W/AUTO DIFF WBC: CPT

## 2018-09-14 PROCEDURE — 80069 RENAL FUNCTION PANEL: CPT

## 2018-09-14 PROCEDURE — 83970 ASSAY OF PARATHORMONE: CPT

## 2018-09-14 PROCEDURE — 82306 VITAMIN D 25 HYDROXY: CPT

## 2018-09-15 LAB — 25(OH)D3+25(OH)D2 SERPL-MCNC: 34 NG/ML

## 2018-09-18 ENCOUNTER — OFFICE VISIT (OUTPATIENT)
Dept: UROLOGY | Facility: CLINIC | Age: 73
End: 2018-09-18
Payer: MEDICARE

## 2018-09-18 ENCOUNTER — TELEPHONE (OUTPATIENT)
Dept: NEPHROLOGY | Facility: CLINIC | Age: 73
End: 2018-09-18

## 2018-09-18 VITALS — RESPIRATION RATE: 16 BRPM | BODY MASS INDEX: 43.79 KG/M2 | WEIGHT: 262.81 LBS | HEIGHT: 65 IN

## 2018-09-18 DIAGNOSIS — R35.1 NOCTURIA: ICD-10-CM

## 2018-09-18 DIAGNOSIS — R39.15 URINARY URGENCY: ICD-10-CM

## 2018-09-18 DIAGNOSIS — R39.89 SUSPECTED UTI: ICD-10-CM

## 2018-09-18 DIAGNOSIS — N39.46 MIXED STRESS AND URGE URINARY INCONTINENCE: ICD-10-CM

## 2018-09-18 PROCEDURE — 99214 OFFICE O/P EST MOD 30 MIN: CPT | Mod: PBBFAC | Performed by: NURSE PRACTITIONER

## 2018-09-18 PROCEDURE — 99214 OFFICE O/P EST MOD 30 MIN: CPT | Mod: S$PBB,,, | Performed by: NURSE PRACTITIONER

## 2018-09-18 PROCEDURE — 81001 URINALYSIS AUTO W/SCOPE: CPT | Mod: PBBFAC | Performed by: NURSE PRACTITIONER

## 2018-09-18 PROCEDURE — 99999 PR PBB SHADOW E&M-EST. PATIENT-LVL IV: CPT | Mod: PBBFAC,,, | Performed by: NURSE PRACTITIONER

## 2018-09-18 RX ORDER — OXYBUTYNIN CHLORIDE 15 MG/1
15 TABLET, EXTENDED RELEASE ORAL DAILY
Qty: 30 TABLET | Refills: 11 | Status: SHIPPED | OUTPATIENT
Start: 2018-09-18 | End: 2018-11-07

## 2018-09-18 NOTE — PROGRESS NOTES
Subjective:       Patient ID: Luisana Chun is a 73 y.o. female who is an established patient was last seen in this office 6/26/2018    Chief Complaint:   Chief Complaint   Patient presents with    Follow-up       Urinary Incontinence  Patient complains of urinary incontinence. This has been present for several years. She leaks urine with bending, coughing, lifting, laughing, sneezing, with urge, with a full bladder. Patient describes the symptoms as nocturia 4 times per night, urge to urinate with little or no warning and urine leakage with coughing/heavy physical activity. Factors associated with symptoms include none known. Evaluation to date includes none. Treatment to date includes none.    Patient treated with Augmentin for UTI (Klebsiella ESBL) in 3/2018. She was referred to ID by her nephrologist d/t abnormal urinalysis and UCx--+Klebsiella ESBL on 6/13/18. Patient seen by Infectious Disease who recommends treatment of UTI with IV antibiotics only when patient planned for invasive procedures and/or symptomatic.    She was initially given Ditropan 5 mg by Dr. Armstrong. She did not note significant improvement with urinary symptoms. Ditropan increased to 10 mg during her last office visit with myself. She feels that the medication is not working effectively. Still with FABIENNE--though she reports UUI more bothersome. She has not been able to perform Kegel exercises much.  Denies dysuria, gross hematuria, ISAIAS, pelvic/flank pain or fever.    CA (6/19/18)-- normal, PVR--1.3 ml    PVR (bladder scan) last visit- 0 ml    ACTIVE MEDICAL ISSUES:  Patient Active Problem List   Diagnosis    Osteoporosis without pathological fracture    Menopausal state    Secondary hyperparathyroidism    Essential hypertension    Obesity, Class II, BMI 35-39.9    Vitamin D deficiency    Normocytic anemia    Rosacea    Bilateral chronic knee pain    Sleep disturbance    KING (obstructive sleep apnea)    CKD (chronic kidney  disease), stage IV    Hypertensive CKD (chronic kidney disease)    Primary hyperparathyroidism    Pedal edema    Abnormal urinalysis    Morbid obesity due to excess calories    Mild episode of recurrent major depressive disorder    Primary thyroid cancer    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  Review of patient's allergies indicates:   Allergen Reactions    Amlodipine      Ankle swelling     Current Outpatient Medications   Medication Sig    azelastine (ASTELIN) 137 mcg (0.1 %) nasal spray 1 spray (137 mcg total) by Nasal route 2 (two) times daily.    docosahexanoic acid/epa (FISH OIL ORAL) Take by mouth once daily.    ergocalciferol (ERGOCALCIFEROL) 50,000 unit Cap Take 1 capsule (50,000 Units total) by mouth every 7 days.    FERROUS SULFATE ORAL Take 28 mg by mouth once daily.    FLUoxetine (PROZAC) 40 MG capsule Take 2 capsules (80 mg total) by mouth once daily.    fluticasone (FLONASE) 50 mcg/actuation nasal spray 1 spray (50 mcg total) by Each Nare route 2 (two) times daily.    GUAIFENESIN ORAL Take 400 mg by mouth as needed.    irbesartan (AVAPRO) 300 MG tablet TAKE ONE TABLET BY MOUTH IN THE EVENING    levocetirizine (XYZAL) 5 MG tablet     levothyroxine (SYNTHROID) 150 MCG tablet Take 150 mcg by mouth 3 (three) times daily.    metoprolol succinate (TOPROL-XL) 100 MG 24 hr tablet Take 1 tablet (100 mg total) by mouth once daily. For high blood pressure    NIFEdipine (PROCARDIA-XL) 30 MG (OSM) 24 hr tablet Take 1 tablet (30 mg total) by mouth once daily.    PHENYLEPH/MINERAL OIL/PETROLAT (PREPARATION H RECT) Place rectally once daily.    sodium bicarbonate 325 MG  "tablet Take 325 mg by mouth 4 (four) times daily.    sucralfate (CARAFATE) 1 gram tablet TAKE 1 TABLET BY MOUTH THREE TIMES DAILY FOR ACID REFLUX    SYNTHROID 200 mcg tablet     traZODone (DESYREL) 300 MG tablet Take 2 tablets (600 mg total) by mouth every evening.    triamcinolone acetonide 0.1% (KENALOG) 0.1 % cream     triamcinolone acetonide 0.1% (KENALOG) 0.1 % cream APPLY TOPICALLY 2 TIMES DAILY    zinc (CHELATED ZINC) 50 mg Tab Take by mouth once daily.    oxybutynin (DITROPAN XL) 15 MG TR24 Take 1 tablet (15 mg total) by mouth once daily.     No current facility-administered medications for this visit.        Review of Systems   Constitutional: Negative for activity change, chills, fatigue, fever and unexpected weight change.   Eyes: Negative for discharge, redness and visual disturbance.   Respiratory: Negative for cough, shortness of breath and wheezing.    Cardiovascular: Negative for chest pain and leg swelling.   Gastrointestinal: Negative for abdominal distention, abdominal pain, constipation, diarrhea, nausea and vomiting.   Genitourinary: Positive for frequency and urgency. Negative for decreased urine volume, difficulty urinating, dysuria, flank pain, hematuria and pelvic pain.   Musculoskeletal: Negative for arthralgias, joint swelling and myalgias.   Skin: Negative for color change and rash.   Neurological: Negative for dizziness and light-headedness.   Psychiatric/Behavioral: Negative for behavioral problems and confusion. The patient is not nervous/anxious.        Objective:      Vitals:    09/18/18 1541   Resp: 16   Weight: 119.2 kg (262 lb 12.6 oz)   Height: 5' 5" (1.651 m)     Physical Exam   Constitutional: She is oriented to person, place, and time. She appears well-developed.   HENT:   Head: Normocephalic and atraumatic.   Nose: Nose normal.   Eyes: Conjunctivae are normal. Right eye exhibits no discharge. Left eye exhibits no discharge.   Neck: Normal range of motion. Neck supple. " No tracheal deviation present. No thyromegaly present.   Cardiovascular: Normal rate and regular rhythm.    Pulmonary/Chest: Effort normal. No respiratory distress. She has no wheezes.   Abdominal: Soft. She exhibits no distension. There is no hepatosplenomegaly. There is no tenderness. There is no CVA tenderness. No hernia.   Genitourinary:   Genitourinary Comments: Patient declined exam   Musculoskeletal: Normal range of motion. She exhibits no edema.   Neurological: She is alert and oriented to person, place, and time.   Skin: Skin is warm and dry. No rash noted. No erythema.     Psychiatric: She has a normal mood and affect. Her behavior is normal. Judgment normal.       Urine dipstick shows + LE, +Nitrite, trace protein.      Assessment:       1. Mixed stress and urge urinary incontinence    2. Urinary urgency    3. Nocturia    4. Suspected UTI          Plan:       1. Mixed stress and urge urinary incontinence   - Discussed difference of UUI and FABIENNE components. Reviewed etiology and workup of each.   - FABIENNE: Kegels, PFPT, pessary, bulking agent, MUS.   - UUI: Behavioral changes, PFPT, anticholinergics, mirabegron. Botox/InterStim for refractory UUI   -Ditropan XL 5 mg--not effective   -Trial Ditropan 10 mg--no significant improvement. Will increase to 15 mg  -continue Kegel exercises  - CA--normal  - POCT urinalysis, dipstick or tablet reag  - oxybutynin (DITROPAN XL) 15 MG TR24; Take 1 tablet (15 mg total) by mouth once daily.  Dispense: 30 tablet; Refill: 11  -Plan for cysto/urodynamics on Friday 11/16/18 with Dr. Armstrong  -UCx ordered today. Patient instructed to drop off urine specimen at lab 1 week prior to procedure    2. Urinary urgency  - oxybutynin (DITROPAN XL) 15 MG TR24; Take 1 tablet (15 mg total) by mouth once daily.  Dispense: 30 tablet; Refill: 11  - Urine culture; Future    3. Nocturia      4. Suspected UTI  -patient asymptomatic  -recently evaluated by ID with recommendations to treat UTI ONLY  before invasive procedures or if patient symptomatic   -UCx prior to procedure. Orders placed today               Follow-up in about 6 weeks (around 10/30/2018) for Follow up.

## 2018-09-19 ENCOUNTER — OFFICE VISIT (OUTPATIENT)
Dept: UROLOGY | Facility: CLINIC | Age: 73
End: 2018-09-19
Payer: MEDICARE

## 2018-09-19 ENCOUNTER — RESEARCH ENCOUNTER (OUTPATIENT)
Dept: RESEARCH | Facility: HOSPITAL | Age: 73
End: 2018-09-19

## 2018-09-19 ENCOUNTER — LAB VISIT (OUTPATIENT)
Dept: LAB | Facility: HOSPITAL | Age: 73
End: 2018-09-19
Attending: UROLOGY
Payer: MEDICARE

## 2018-09-19 VITALS
DIASTOLIC BLOOD PRESSURE: 65 MMHG | WEIGHT: 264.75 LBS | HEIGHT: 69 IN | HEART RATE: 78 BPM | SYSTOLIC BLOOD PRESSURE: 97 MMHG | BODY MASS INDEX: 39.21 KG/M2

## 2018-09-19 DIAGNOSIS — N39.0 RECURRENT UTI: Primary | ICD-10-CM

## 2018-09-19 DIAGNOSIS — N18.4 CKD (CHRONIC KIDNEY DISEASE), STAGE IV: ICD-10-CM

## 2018-09-19 DIAGNOSIS — N18.4 HYPERTENSIVE KIDNEY DISEASE WITH STAGE 4 CHRONIC KIDNEY DISEASE: ICD-10-CM

## 2018-09-19 DIAGNOSIS — R39.15 URINARY URGENCY: ICD-10-CM

## 2018-09-19 DIAGNOSIS — G47.33 OSA (OBSTRUCTIVE SLEEP APNEA): ICD-10-CM

## 2018-09-19 DIAGNOSIS — N39.46 MIXED STRESS AND URGE URINARY INCONTINENCE: ICD-10-CM

## 2018-09-19 DIAGNOSIS — N39.46 MIXED INCONTINENCE: Primary | ICD-10-CM

## 2018-09-19 DIAGNOSIS — N95.1 MENOPAUSAL STATE: ICD-10-CM

## 2018-09-19 DIAGNOSIS — E66.01 MORBID OBESITY DUE TO EXCESS CALORIES: ICD-10-CM

## 2018-09-19 DIAGNOSIS — I10 ESSENTIAL HYPERTENSION: ICD-10-CM

## 2018-09-19 DIAGNOSIS — I12.9 HYPERTENSIVE KIDNEY DISEASE WITH STAGE 4 CHRONIC KIDNEY DISEASE: ICD-10-CM

## 2018-09-19 PROBLEM — R82.90 ABNORMAL URINALYSIS: Status: RESOLVED | Noted: 2017-10-24 | Resolved: 2018-09-19

## 2018-09-19 LAB
BACTERIA #/AREA URNS AUTO: ABNORMAL /HPF
BILIRUB UR QL STRIP: NEGATIVE
CLARITY UR REFRACT.AUTO: ABNORMAL
COLOR UR AUTO: YELLOW
GLUCOSE UR QL STRIP: NEGATIVE
HGB UR QL STRIP: NEGATIVE
KETONES UR QL STRIP: NEGATIVE
LEUKOCYTE ESTERASE UR QL STRIP: ABNORMAL
MICROSCOPIC COMMENT: ABNORMAL
NITRITE UR QL STRIP: NEGATIVE
PH UR STRIP: 5 [PH] (ref 5–8)
PROT UR QL STRIP: NEGATIVE
RBC #/AREA URNS AUTO: 1 /HPF (ref 0–4)
SP GR UR STRIP: 1.01 (ref 1–1.03)
SQUAMOUS #/AREA URNS AUTO: 10 /HPF
URN SPEC COLLECT METH UR: ABNORMAL
UROBILINOGEN UR STRIP-ACNC: NEGATIVE EU/DL
WBC #/AREA URNS AUTO: 74 /HPF (ref 0–5)

## 2018-09-19 PROCEDURE — 99999 PR PBB SHADOW E&M-EST. PATIENT-LVL IV: CPT | Mod: PBBFAC,,, | Performed by: UROLOGY

## 2018-09-19 PROCEDURE — 99215 OFFICE O/P EST HI 40 MIN: CPT | Mod: S$PBB,,, | Performed by: UROLOGY

## 2018-09-19 PROCEDURE — 87186 SC STD MICRODIL/AGAR DIL: CPT

## 2018-09-19 PROCEDURE — 87077 CULTURE AEROBIC IDENTIFY: CPT

## 2018-09-19 PROCEDURE — 87086 URINE CULTURE/COLONY COUNT: CPT

## 2018-09-19 PROCEDURE — 99214 OFFICE O/P EST MOD 30 MIN: CPT | Mod: PBBFAC | Performed by: UROLOGY

## 2018-09-19 PROCEDURE — 81001 URINALYSIS AUTO W/SCOPE: CPT

## 2018-09-19 PROCEDURE — 87088 URINE BACTERIA CULTURE: CPT

## 2018-09-19 RX ORDER — CIPROFLOXACIN 250 MG/1
500 TABLET, FILM COATED ORAL
Status: CANCELLED | OUTPATIENT
Start: 2018-09-19

## 2018-09-19 NOTE — PROGRESS NOTES
Study: Supplement and Effects on Multi-Drug Resistant Urinary Tract Infection  [facility] Protocol#: 2017.564    PI: Melany Anderson MD      I met with patient on 9/19/2018 to discuss possible participation in the above research study. Patient was given a copy of the informed consent form for review. The informed consent form was read in full. All confidentiality and study requirements were discussed. Ample opportunity was provided for question and answers. Patient verbalized that all questions were satisfactorily answered and full understanding of the protocol and its requirements. Patient was provided with research coordinator and MD contact information for future questions or concerns. Patient denied involvement in any other research study. Patient voluntarily agreed to participate in the research study. Informed consent was signed and patient was provided with a signed consent form copy for their records. Consent was obtained prior to conducting any study related procedures.

## 2018-09-19 NOTE — H&P
Chief Complaint:       Chief Complaint   Patient presents with    Follow-up         Urinary Incontinence  Patient complains of urinary incontinence. This has been present for several years. She leaks urine with bending, coughing, lifting, laughing, sneezing, with urge, with a full bladder. Patient describes the symptoms as nocturia 4 times per night, urge to urinate with little or no warning and urine leakage with coughing/heavy physical activity. Factors associated with symptoms include none known. Evaluation to date includes none. Treatment to date includes none.     Patient treated with Augmentin for UTI (Klebsiella ESBL) in 3/2018. She was referred to ID by her nephrologist d/t abnormal urinalysis and UCx--+Klebsiella ESBL on 6/13/18. Patient seen by Infectious Disease who recommends treatment of UTI with IV antibiotics only when patient planned for invasive procedures and/or symptomatic.     She was initially given Ditropan 5 mg by Dr. Armstrong. She did not note significant improvement with urinary symptoms. Ditropan increased to 10 mg during her last office visit with myself. She feels that the medication is not working effectively. Still with FABEINNE--though she reports UUI more bothersome. She has not been able to perform Kegel exercises much.  Denies dysuria, gross hematuria, ISAIAS, pelvic/flank pain or fever.     CA (6/19/18)-- normal, PVR--1.3 ml     PVR (bladder scan) last visit- 0 ml     ACTIVE MEDICAL ISSUES:      Patient Active Problem List   Diagnosis    Osteoporosis without pathological fracture    Menopausal state    Secondary hyperparathyroidism    Essential hypertension    Obesity, Class II, BMI 35-39.9    Vitamin D deficiency    Normocytic anemia    Rosacea    Bilateral chronic knee pain    Sleep disturbance    KING (obstructive sleep apnea)    CKD (chronic kidney disease), stage IV    Hypertensive CKD (chronic kidney disease)    Primary hyperparathyroidism    Pedal edema    Abnormal  urinalysis    Morbid obesity due to excess calories    Mild episode of recurrent major depressive disorder    Primary thyroid cancer    Chronic knee pain 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        Review of patient's allergies indicates:   Allergen Reactions    Amlodipine         Ankle swelling           Current Outpatient Medications   Medication Sig    azelastine (ASTELIN) 137 mcg (0.1 %) nasal spray 1 spray (137 mcg total) by Nasal route 2 (two) times daily.    docosahexanoic acid/epa (FISH OIL ORAL) Take by mouth once daily.    ergocalciferol (ERGOCALCIFEROL) 50,000 unit Cap Take 1 capsule (50,000 Units total) by mouth every 7 days.    FERROUS SULFATE ORAL Take 28 mg by mouth once daily.    FLUoxetine (PROZAC) 40 MG capsule Take 2 capsules (80 mg total) by mouth once daily.    fluticasone (FLONASE) 50 mcg/actuation nasal spray 1 spray (50 mcg total) by Each Nare route 2 (two) times daily.    GUAIFENESIN ORAL Take 400 mg by mouth as needed.    irbesartan (AVAPRO) 300 MG tablet TAKE ONE TABLET BY MOUTH IN THE EVENING    levocetirizine (XYZAL) 5 MG tablet      levothyroxine (SYNTHROID) 150 MCG tablet Take 150 mcg by mouth 3 (three) times daily.    metoprolol succinate (TOPROL-XL) 100 MG 24 hr tablet Take 1 tablet (100 mg total) by mouth once daily. For high blood pressure    NIFEdipine (PROCARDIA-XL) 30 MG (OSM) 24 hr tablet Take 1 tablet (30 mg total) by mouth once daily.    PHENYLEPH/MINERAL OIL/PETROLAT (PREPARATION H RECT) Place rectally once daily.    sodium bicarbonate 325 MG tablet Take 325 mg by mouth 4 (four) times daily.    sucralfate (CARAFATE) 1 gram tablet TAKE 1 TABLET BY  "MOUTH THREE TIMES DAILY FOR ACID REFLUX    SYNTHROID 200 mcg tablet      traZODone (DESYREL) 300 MG tablet Take 2 tablets (600 mg total) by mouth every evening.    triamcinolone acetonide 0.1% (KENALOG) 0.1 % cream      triamcinolone acetonide 0.1% (KENALOG) 0.1 % cream APPLY TOPICALLY 2 TIMES DAILY    zinc (CHELATED ZINC) 50 mg Tab Take by mouth once daily.    oxybutynin (DITROPAN XL) 15 MG TR24 Take 1 tablet (15 mg total) by mouth once daily.      No current facility-administered medications for this visit.          Review of Systems   Constitutional: Negative for activity change, chills, fatigue, fever and unexpected weight change.   Eyes: Negative for discharge, redness and visual disturbance.   Respiratory: Negative for cough, shortness of breath and wheezing.    Cardiovascular: Negative for chest pain and leg swelling.   Gastrointestinal: Negative for abdominal distention, abdominal pain, constipation, diarrhea, nausea and vomiting.   Genitourinary: Positive for frequency and urgency. Negative for decreased urine volume, difficulty urinating, dysuria, flank pain, hematuria and pelvic pain.   Musculoskeletal: Negative for arthralgias, joint swelling and myalgias.   Skin: Negative for color change and rash.   Neurological: Negative for dizziness and light-headedness.   Psychiatric/Behavioral: Negative for behavioral problems and confusion. The patient is not nervous/anxious.        Objective:   Vitals       Vitals:     09/18/18 1541   Resp: 16   Weight: 119.2 kg (262 lb 12.6 oz)   Height: 5' 5" (1.651 m)         Physical Exam   Constitutional: She is oriented to person, place, and time. She appears well-developed.   HENT:   Head: Normocephalic and atraumatic.   Nose: Nose normal.   Eyes: Conjunctivae are normal. Right eye exhibits no discharge. Left eye exhibits no discharge.   Neck: Normal range of motion. Neck supple. No tracheal deviation present. No thyromegaly present.   Cardiovascular: Normal rate " and regular rhythm.    Pulmonary/Chest: Effort normal. No respiratory distress. She has no wheezes.   Abdominal: Soft. She exhibits no distension. There is no hepatosplenomegaly. There is no tenderness. There is no CVA tenderness. No hernia.   Genitourinary:   Genitourinary Comments: Patient declined exam   Musculoskeletal: Normal range of motion. She exhibits no edema.   Neurological: She is alert and oriented to person, place, and time.   Skin: Skin is warm and dry. No rash noted. No erythema.     Psychiatric: She has a normal mood and affect. Her behavior is normal. Judgment normal.       Urine dipstick shows + LE, +Nitrite, trace protein.       Assessment:       1. Mixed stress and urge urinary incontinence    2. Urinary urgency    3. Nocturia    4. Suspected UTI           Plan:       1. Mixed stress and urge urinary incontinence   - Discussed difference of UUI and FABIENNE components. Reviewed etiology and workup of each.   - FABIENNE: Kegels, PFPT, pessary, bulking agent, MUS.   - UUI: Behavioral changes, PFPT, anticholinergics, mirabegron. Botox/InterStim for refractory UUI   -Ditropan XL 5 mg--not effective   -Trial Ditropan 10 mg--no significant improvement. Will increase to 15 mg  -continue Kegel exercises  - CA--normal  - POCT urinalysis, dipstick or tablet reag  - oxybutynin (DITROPAN XL) 15 MG TR24; Take 1 tablet (15 mg total) by mouth once daily.  Dispense: 30 tablet; Refill: 11  -Plan for cysto/urodynamics on Friday 11/16/18 with Dr. Armstrong  -UCx ordered today. Patient instructed to drop off urine specimen at lab 1 week prior to procedure     2. Urinary urgency  - oxybutynin (DITROPAN XL) 15 MG TR24; Take 1 tablet (15 mg total) by mouth once daily.  Dispense: 30 tablet; Refill: 11  - Urine culture; Future     3. Nocturia        4. Suspected UTI  -patient asymptomatic  -recently evaluated by ID with recommendations to treat UTI ONLY before invasive procedures or if patient symptomatic   -UCx prior to  procedure. Orders placed today                  Follow-up in about 6 weeks (around 10/30/2018) for Follow up.

## 2018-09-19 NOTE — H&P (VIEW-ONLY)
Subjective:       Patient ID: Luisana Chun is a 73 y.o. female.    Chief Complaint: Urinary Tract Infection (research patient)    Luisana Chun is a 73 y.o. Female here for recurrent UTIs her whole life.   She is currently seeing Dr. Armstrong.     They are now MDR UTI.   1/2018, 3/2018, 6/2018 MDR Klebsiella  11/17 E. Coli resist to cipro    Symptoms are urgency, leakage associated with urgency.   She is on oxybutnin 15mg. It is not helping now, maybe mild improvement for 2 weeks. . Has dry mouth.   Has stress incontinence. Sometimes wears pad.   Urinary frequency every 2 hours. Nocturia x 3.   Doesn't limit fluids before bedtime.   No caffeine after 3.   Sleep apnea is debatable. History of cpap. She had sinusitis and residual sinus issues.   Swelling in legs at the end of the day.     Both leakages are bad. Last few weeks urge incontinence is worse than the stress.     No gross hematuria.   No dysuria.     No history of kidney stone.     Feels she completely empties.     No breast or uterine cancer.   She was on estrogen and progesterone for bone density issues, never vaginal estrogen.   She has been off for a year.     No constipation.     No d - mannose.     Drinks fluids juices, iced tea, coffee                Past Surgical History:   Procedure Laterality Date    APPENDECTOMY      BACK SURGERY  february 2016    TBUEIG-[id number]-DI N/A 6/10/2016    Performed by [facility] at [facility] OR [address] [place] [address]    COLONOSCOPY      ESOPHAGOGASTRODUODENOSCOPY      KNEE SURGERY Bilateral     left toe Left     TONSILLECTOMY      tumor biopsy thyroid         Past Medical History:   Diagnosis Date    Hypertension     Kidney disease, chronic, stage III (GFR 30-59 ml/min)     Obesity     Osteoporosis     Thyroid cancer        Social History     Socioeconomic History    Marital status:      Spouse name: Not on file    Number of children: Not on file    Years of education: Not on file    Highest  education level: Not on file   Social Needs    Financial resource strain: Not on file    Food insecurity - worry: Not on file    Food insecurity - inability: Not on file    Transportation needs - medical: Not on file    Transportation needs - non-medical: Not on file   Occupational History    Not on file   Tobacco Use    Smoking status: Former Smoker    Smokeless tobacco: Never Used   Substance and Sexual Activity    Alcohol use: Yes     Alcohol/week: 0.0 oz     Comment: social    Drug use: No    Sexual activity: Not Currently     Partners: Male     Comment: 5/4/17 single   Other Topics Concern    Not on file   Social History Narrative    Not on file       Family History   Problem Relation Age of Onset    Hypertension Mother     Heart disease Mother     Macular degeneration Mother     Hypertension Father     Cataracts Father     Glaucoma Father     Macular degeneration Father     Glaucoma Brother     Macular degeneration Brother     No Known Problems Sister     No Known Problems Maternal Aunt     No Known Problems Maternal Uncle     No Known Problems Paternal Aunt     No Known Problems Paternal Uncle     Macular degeneration Maternal Grandmother     Macular degeneration Maternal Grandfather     Macular degeneration Paternal Grandmother     Glaucoma Paternal Grandfather     Macular degeneration Paternal Grandfather     Amblyopia Neg Hx     Blindness Neg Hx     Cancer Neg Hx     Diabetes Neg Hx     Retinal detachment Neg Hx     Strabismus Neg Hx     Stroke Neg Hx     Thyroid disease Neg Hx        Current Outpatient Medications   Medication Sig Dispense Refill    azelastine (ASTELIN) 137 mcg (0.1 %) nasal spray 1 spray (137 mcg total) by Nasal route 2 (two) times daily. 30 mL 0    docosahexanoic acid/epa (FISH OIL ORAL) Take by mouth once daily.      ergocalciferol (ERGOCALCIFEROL) 50,000 unit Cap Take 1 capsule (50,000 Units total) by mouth every 7 days. 12 capsule 1     FERROUS SULFATE ORAL Take 28 mg by mouth once daily.      FLUoxetine (PROZAC) 40 MG capsule Take 2 capsules (80 mg total) by mouth once daily. 180 capsule 0    fluticasone (FLONASE) 50 mcg/actuation nasal spray 1 spray (50 mcg total) by Each Nare route 2 (two) times daily. 1 Bottle 11    GUAIFENESIN ORAL Take 400 mg by mouth as needed.      irbesartan (AVAPRO) 300 MG tablet TAKE ONE TABLET BY MOUTH IN THE EVENING 90 tablet 3    levocetirizine (XYZAL) 5 MG tablet       levothyroxine (SYNTHROID) 150 MCG tablet Take 150 mcg by mouth 3 (three) times daily.      metoprolol succinate (TOPROL-XL) 100 MG 24 hr tablet Take 1 tablet (100 mg total) by mouth once daily. For high blood pressure 90 tablet 3    NIFEdipine (PROCARDIA-XL) 30 MG (OSM) 24 hr tablet Take 1 tablet (30 mg total) by mouth once daily. 30 tablet 11    oxybutynin (DITROPAN XL) 15 MG TR24 Take 1 tablet (15 mg total) by mouth once daily. 30 tablet 11    PHENYLEPH/MINERAL OIL/PETROLAT (PREPARATION H RECT) Place rectally once daily.      sodium bicarbonate 325 MG tablet Take 325 mg by mouth 4 (four) times daily.      sucralfate (CARAFATE) 1 gram tablet TAKE 1 TABLET BY MOUTH THREE TIMES DAILY FOR ACID REFLUX 90 tablet 1    SYNTHROID 200 mcg tablet       traZODone (DESYREL) 300 MG tablet Take 2 tablets (600 mg total) by mouth every evening. 60 tablet 3    triamcinolone acetonide 0.1% (KENALOG) 0.1 % cream       triamcinolone acetonide 0.1% (KENALOG) 0.1 % cream APPLY TOPICALLY 2 TIMES DAILY 45 g 1    zinc (CHELATED ZINC) 50 mg Tab Take by mouth once daily.       No current facility-administered medications for this visit.        Review of patient's allergies indicates:   Allergen Reactions    Amlodipine      Ankle swelling        BMP  Lab Results   Component Value Date     09/14/2018     09/14/2018    K 4.4 09/14/2018    K 4.4 09/14/2018     09/14/2018     09/14/2018    CO2 19 (L) 09/14/2018    CO2 19 (L) 09/14/2018     BUN 33 (H) 09/14/2018    BUN 33 (H) 09/14/2018    CREATININE 1.9 (H) 09/14/2018    CREATININE 1.9 (H) 09/14/2018    CALCIUM 8.2 (L) 09/14/2018    CALCIUM 8.2 (L) 09/14/2018    ANIONGAP 11 09/14/2018    ANIONGAP 11 09/14/2018    ESTGFRAFRICA 30 (A) 09/14/2018    ESTGFRAFRICA 30 (A) 09/14/2018    EGFRNONAA 26 (A) 09/14/2018    EGFRNONAA 26 (A) 09/14/2018       Review of Systems   Constitutional: Negative for chills, fever and unexpected weight change.   HENT: Negative for nosebleeds.    Eyes: Negative for visual disturbance.   Respiratory: Negative for chest tightness.    Cardiovascular: Negative for chest pain.   Gastrointestinal: Negative for diarrhea.   Genitourinary: Positive for frequency, nocturia and urgency. Negative for dysuria and hematuria.   Musculoskeletal: Negative for myalgias.   Skin: Negative for rash.   Neurological: Negative for seizures.   Hematological: Does not bruise/bleed easily.   Psychiatric/Behavioral: Negative for behavioral problems.     Objective:      Physical Exam   Vitals reviewed.  Constitutional: She is oriented to person, place, and time. She appears well-developed and well-nourished.   HENT:   Head: Normocephalic and atraumatic.   Eyes: No scleral icterus.   Cardiovascular: Normal rate and regular rhythm.    Pulmonary/Chest: Effort normal. No respiratory distress.   Abdominal: She exhibits no mass.   Genitourinary:   Genitourinary Comments: The external genitalia were normal. No rash. Atrophic vaginitis was present. The urethral showed no evidence of a urethral diverticulum.  And in and out cath was performed under sterile conditions immediately after voiding. The PVR was 15 ml.    Perineum normal. External hemorrhoids were not present. Levator was not tender to palpation.   The uterus was not present. A cystocele was present, grade I. A rectocele was not present. No FABIENNE documented on exam today.      Musculoskeletal: She exhibits no tenderness.   Lymphadenopathy:     She has no  cervical adenopathy.   Neurological: She is alert and oriented to person, place, and time.   Skin: Skin is warm and dry. No rash noted.     Psychiatric: She has a normal mood and affect.     urine dip 1+ leuk, trace protein, ph 5  Assessment:       1. Recurrent UTI    2. Menopausal state    3. Essential hypertension    4. CKD (chronic kidney disease), stage IV    5. Hypertensive kidney disease with stage 4 chronic kidney disease        Plan:   Luisana was seen today for urinary tract infection.    Diagnoses and all orders for this visit:    Recurrent UTI  -     POCT urinalysis, dipstick or tablet reag  -     Urinalysis  -     Urine culture    Other orders  -     conjugated estrogens (PREMARIN) vaginal cream; Pea size amount to urethral area every 2 weeks, then 3x / week    Probiotic trial discussed.   Patient scheduled for cystoscopy, urodynamics in November with Dr. Armstrong, but she would like to do it earlier. Will look at schedule and see what we have.   Urine culture from cath urine.     I spent 40 minutes with the patient of which more than half was spent in direct consultation with the patient in regards to our treatment and plan.

## 2018-09-19 NOTE — PROGRESS NOTES
Subjective:       Patient ID: Luisana Chun is a 73 y.o. female.    Chief Complaint: Urinary Tract Infection (research patient)    Luisana Chun is a 73 y.o. Female here for recurrent UTIs her whole life.   She is currently seeing Dr. Armstrong.     They are now MDR UTI.   1/2018, 3/2018, 6/2018 MDR Klebsiella  11/17 E. Coli resist to cipro    Symptoms are urgency, leakage associated with urgency.   She is on oxybutnin 15mg. It is not helping now, maybe mild improvement for 2 weeks. . Has dry mouth.   Has stress incontinence. Sometimes wears pad.   Urinary frequency every 2 hours. Nocturia x 3.   Doesn't limit fluids before bedtime.   No caffeine after 3.   Sleep apnea is debatable. History of cpap. She had sinusitis and residual sinus issues.   Swelling in legs at the end of the day.     Both leakages are bad. Last few weeks urge incontinence is worse than the stress.     No gross hematuria.   No dysuria.     No history of kidney stone.     Feels she completely empties.     No breast or uterine cancer.   She was on estrogen and progesterone for bone density issues, never vaginal estrogen.   She has been off for a year.     No constipation.     No d - mannose.     Drinks fluids juices, iced tea, coffee                Past Surgical History:   Procedure Laterality Date    APPENDECTOMY      BACK SURGERY  february 2016    SUCAXU-[id number]-TB N/A 6/10/2016    Performed by [facility] at [facility] OR [address] [place] [address]    COLONOSCOPY      ESOPHAGOGASTRODUODENOSCOPY      KNEE SURGERY Bilateral     left toe Left     TONSILLECTOMY      tumor biopsy thyroid         Past Medical History:   Diagnosis Date    Hypertension     Kidney disease, chronic, stage III (GFR 30-59 ml/min)     Obesity     Osteoporosis     Thyroid cancer        Social History     Socioeconomic History    Marital status:      Spouse name: Not on file    Number of children: Not on file    Years of education: Not on file    Highest  education level: Not on file   Social Needs    Financial resource strain: Not on file    Food insecurity - worry: Not on file    Food insecurity - inability: Not on file    Transportation needs - medical: Not on file    Transportation needs - non-medical: Not on file   Occupational History    Not on file   Tobacco Use    Smoking status: Former Smoker    Smokeless tobacco: Never Used   Substance and Sexual Activity    Alcohol use: Yes     Alcohol/week: 0.0 oz     Comment: social    Drug use: No    Sexual activity: Not Currently     Partners: Male     Comment: 5/4/17 single   Other Topics Concern    Not on file   Social History Narrative    Not on file       Family History   Problem Relation Age of Onset    Hypertension Mother     Heart disease Mother     Macular degeneration Mother     Hypertension Father     Cataracts Father     Glaucoma Father     Macular degeneration Father     Glaucoma Brother     Macular degeneration Brother     No Known Problems Sister     No Known Problems Maternal Aunt     No Known Problems Maternal Uncle     No Known Problems Paternal Aunt     No Known Problems Paternal Uncle     Macular degeneration Maternal Grandmother     Macular degeneration Maternal Grandfather     Macular degeneration Paternal Grandmother     Glaucoma Paternal Grandfather     Macular degeneration Paternal Grandfather     Amblyopia Neg Hx     Blindness Neg Hx     Cancer Neg Hx     Diabetes Neg Hx     Retinal detachment Neg Hx     Strabismus Neg Hx     Stroke Neg Hx     Thyroid disease Neg Hx        Current Outpatient Medications   Medication Sig Dispense Refill    azelastine (ASTELIN) 137 mcg (0.1 %) nasal spray 1 spray (137 mcg total) by Nasal route 2 (two) times daily. 30 mL 0    docosahexanoic acid/epa (FISH OIL ORAL) Take by mouth once daily.      ergocalciferol (ERGOCALCIFEROL) 50,000 unit Cap Take 1 capsule (50,000 Units total) by mouth every 7 days. 12 capsule 1     FERROUS SULFATE ORAL Take 28 mg by mouth once daily.      FLUoxetine (PROZAC) 40 MG capsule Take 2 capsules (80 mg total) by mouth once daily. 180 capsule 0    fluticasone (FLONASE) 50 mcg/actuation nasal spray 1 spray (50 mcg total) by Each Nare route 2 (two) times daily. 1 Bottle 11    GUAIFENESIN ORAL Take 400 mg by mouth as needed.      irbesartan (AVAPRO) 300 MG tablet TAKE ONE TABLET BY MOUTH IN THE EVENING 90 tablet 3    levocetirizine (XYZAL) 5 MG tablet       levothyroxine (SYNTHROID) 150 MCG tablet Take 150 mcg by mouth 3 (three) times daily.      metoprolol succinate (TOPROL-XL) 100 MG 24 hr tablet Take 1 tablet (100 mg total) by mouth once daily. For high blood pressure 90 tablet 3    NIFEdipine (PROCARDIA-XL) 30 MG (OSM) 24 hr tablet Take 1 tablet (30 mg total) by mouth once daily. 30 tablet 11    oxybutynin (DITROPAN XL) 15 MG TR24 Take 1 tablet (15 mg total) by mouth once daily. 30 tablet 11    PHENYLEPH/MINERAL OIL/PETROLAT (PREPARATION H RECT) Place rectally once daily.      sodium bicarbonate 325 MG tablet Take 325 mg by mouth 4 (four) times daily.      sucralfate (CARAFATE) 1 gram tablet TAKE 1 TABLET BY MOUTH THREE TIMES DAILY FOR ACID REFLUX 90 tablet 1    SYNTHROID 200 mcg tablet       traZODone (DESYREL) 300 MG tablet Take 2 tablets (600 mg total) by mouth every evening. 60 tablet 3    triamcinolone acetonide 0.1% (KENALOG) 0.1 % cream       triamcinolone acetonide 0.1% (KENALOG) 0.1 % cream APPLY TOPICALLY 2 TIMES DAILY 45 g 1    zinc (CHELATED ZINC) 50 mg Tab Take by mouth once daily.       No current facility-administered medications for this visit.        Review of patient's allergies indicates:   Allergen Reactions    Amlodipine      Ankle swelling        BMP  Lab Results   Component Value Date     09/14/2018     09/14/2018    K 4.4 09/14/2018    K 4.4 09/14/2018     09/14/2018     09/14/2018    CO2 19 (L) 09/14/2018    CO2 19 (L) 09/14/2018     BUN 33 (H) 09/14/2018    BUN 33 (H) 09/14/2018    CREATININE 1.9 (H) 09/14/2018    CREATININE 1.9 (H) 09/14/2018    CALCIUM 8.2 (L) 09/14/2018    CALCIUM 8.2 (L) 09/14/2018    ANIONGAP 11 09/14/2018    ANIONGAP 11 09/14/2018    ESTGFRAFRICA 30 (A) 09/14/2018    ESTGFRAFRICA 30 (A) 09/14/2018    EGFRNONAA 26 (A) 09/14/2018    EGFRNONAA 26 (A) 09/14/2018       Review of Systems   Constitutional: Negative for chills, fever and unexpected weight change.   HENT: Negative for nosebleeds.    Eyes: Negative for visual disturbance.   Respiratory: Negative for chest tightness.    Cardiovascular: Negative for chest pain.   Gastrointestinal: Negative for diarrhea.   Genitourinary: Positive for frequency, nocturia and urgency. Negative for dysuria and hematuria.   Musculoskeletal: Negative for myalgias.   Skin: Negative for rash.   Neurological: Negative for seizures.   Hematological: Does not bruise/bleed easily.   Psychiatric/Behavioral: Negative for behavioral problems.     Objective:      Physical Exam   Vitals reviewed.  Constitutional: She is oriented to person, place, and time. She appears well-developed and well-nourished.   HENT:   Head: Normocephalic and atraumatic.   Eyes: No scleral icterus.   Cardiovascular: Normal rate and regular rhythm.    Pulmonary/Chest: Effort normal. No respiratory distress.   Abdominal: She exhibits no mass.   Genitourinary:   Genitourinary Comments: The external genitalia were normal. No rash. Atrophic vaginitis was present. The urethral showed no evidence of a urethral diverticulum.  And in and out cath was performed under sterile conditions immediately after voiding. The PVR was 15 ml.    Perineum normal. External hemorrhoids were not present. Levator was not tender to palpation.   The uterus was not present. A cystocele was present, grade I. A rectocele was not present. No FABIENNE documented on exam today.      Musculoskeletal: She exhibits no tenderness.   Lymphadenopathy:     She has no  cervical adenopathy.   Neurological: She is alert and oriented to person, place, and time.   Skin: Skin is warm and dry. No rash noted.     Psychiatric: She has a normal mood and affect.     urine dip 1+ leuk, trace protein, ph 5  Assessment:       1. Recurrent UTI    2. Menopausal state    3. Essential hypertension    4. CKD (chronic kidney disease), stage IV    5. Hypertensive kidney disease with stage 4 chronic kidney disease        Plan:   Luisana was seen today for urinary tract infection.    Diagnoses and all orders for this visit:    Recurrent UTI  -     POCT urinalysis, dipstick or tablet reag  -     Urinalysis  -     Urine culture    Other orders  -     conjugated estrogens (PREMARIN) vaginal cream; Pea size amount to urethral area every 2 weeks, then 3x / week    Probiotic trial discussed.   Patient scheduled for cystoscopy, urodynamics in November with Dr. Armstrong, but she would like to do it earlier. Will look at schedule and see what we have.   Urine culture from cath urine.     I spent 40 minutes with the patient of which more than half was spent in direct consultation with the patient in regards to our treatment and plan.

## 2018-09-19 NOTE — PATIENT INSTRUCTIONS
Probiotic trial.     Urine culture today.     2-3 liters of water a day.       Conjugated Estrogens vaginal cream  What is this medicine?  CONJUGATED ESTROGENS (CON manuel AGUILERA tamiko red) are a mixture of female hormones. This cream can help relieve symptoms associated with menopause.like vaginal dryness and irritation.  How should I use this medicine?  This medicine is for use in the vagina only. Do not take by mouth. Follow the directions on the prescription label. Use at bedtime unless otherwise directed by your doctor or health care professional. Use the special applicator supplied with the cream. Wash hands before and after use. Fill the applicator with the cream and remove from the tube. Lie on your back, part and bend your knees. Insert the applicator into the vagina and push the plunger to expel the cream into the vagina. Wash the applicator with warm soapy water and rinse well. Use exactly as directed for the complete length of time prescribed. Do not stop using except on the advice of your doctor or health care professional.  Talk to your pediatrician regarding the use of this medicine in children. Special care may be needed.  A patient package insert for the product will be given with each prescription and refill. Read this sheet carefully each time. The sheet may change frequently.  What side effects may I notice from receiving this medicine?  Side effects that you should report to your doctor or health care professional as soon as possible:  · allergic reactions like skin rash, itching or hives, swelling of the face, lips, or tongue  · breast tissue changes or discharge  · changes in vision  · chest pain  · confusion, trouble speaking or understanding  · dark urine  · general ill feeling or flu-like symptoms  · light-colored stools  · nausea, vomiting  · pain, swelling, warmth in the leg  · right upper belly pain  · severe headaches  · shortness of breath  · sudden numbness or weakness of the face,  arm or leg  · trouble walking, dizziness, loss of balance or coordination  · unusual vaginal bleeding  · yellowing of the eyes or skin  Side effects that usually do not require medical attention (report to your doctor or health care professional if they continue or are bothersome):  · hair loss  · increased hunger or thirst  · increased urination  · symptoms of vaginal infection like itching, irritation or unusual discharge  · unusually weak or tired  What may interact with this medicine?  Do not take this medicine with any of the following medications:  · aromatase inhibitors like aminoglutethimide, anastrozole, exemestane, letrozole, testolactone  This medicine may also interact with the following medications:  · barbiturates used for inducing sleep or treating seizures  · carbamazepine  · grapefruit juice  · medicines for fungal infections like itraconazole and ketoconazole  · raloxifene or tamoxifen  · rifabutin  · rifampin  · rifapentine  · ritonavir  · some antibiotics used to treat infections  · [place]'s Wort  · warfarin  What if I miss a dose?  If you miss a dose, use it as soon as you can. If it is almost time for your next dose, use only that dose. Do not use double or extra doses.  Where should I keep my medicine?  Keep out of the reach of children.  Store at room temperature between 15 and 30 degrees C (59 and 86 degrees F). Throw away any unused medicine after the expiration date.  What should I tell my health care provider before I take this medicine?  They need to know if you have any of these conditions:  · abnormal vaginal bleeding  · blood vessel disease or blood clots  · breast, cervical, endometrial, or uterine cancer  · dementia  · diabetes  · gallbladder disease  · heart disease or recent heart attack  · high blood pressure  · high cholesterol  · high level of calcium in the blood  · hysterectomy  · kidney disease  · liver disease  · migraine headaches  · protein C deficiency  · protein S  deficiency  · stroke  · systemic lupus erythematosus (SLE)  · tobacco smoker  · an unusual or allergic reaction to estrogens other medicines, foods, dyes, or preservatives  · pregnant or trying to get pregnant  · breast-feeding  What should I watch for while using this medicine?  Visit your health care professional for regular checks on your progress. You will need a regular breast and pelvic exam. You should also discuss the need for regular mammograms with your health care professional, and follow his or her guidelines.  This medicine can make your body retain fluid, making your fingers, hands, or ankles swell. Your blood pressure can go up. Contact your doctor or health care professional if you feel you are retaining fluid.  If you have any reason to think you are pregnant; stop taking this medicine at once and contact your doctor or health care professional.  Tobacco smoking increases the risk of getting a blood clot or having a stroke, especially if you are more than 35 years old. You are strongly advised not to smoke.  If you wear contact lenses and notice visual changes, or if the lenses begin to feel uncomfortable, consult your eye care specialist.  If you are going to have elective surgery, you may need to stop taking this medicine beforehand. Consult your health care professional for advice prior to scheduling the surgery.  NOTE:This sheet is a summary. It may not cover all possible information. If you have questions about this medicine, talk to your doctor, pharmacist, or health care provider. Copyright© 2017 [employer]

## 2018-09-20 ENCOUNTER — TELEPHONE (OUTPATIENT)
Dept: UROLOGY | Facility: CLINIC | Age: 73
End: 2018-09-20

## 2018-09-20 NOTE — TELEPHONE ENCOUNTER
Spoke with pt and informed her that we will check to see if there is something comparable that is less expensive. Pt verbalized understanding.

## 2018-09-20 NOTE — TELEPHONE ENCOUNTER
----- Message from Melany Anderson MD sent at 9/20/2018  1:55 PM CDT -----  Contact: [facility]:[phone]    Other option is premarin vaginal cream. Ask her to keep in mind that medicine will last for 2-3 months. Did the pharmacist give another suggestion.   Let me know.   ----- Message -----  From: Harman Simon  Sent: 9/20/2018   9:43 AM  To: Justin PERRY Staff    .Needs Advice    Reason for call:[facility] called and states the pt conjugated estrogens (PREMARIN) vaginal cream is to expensive and the pt can not afford it. Pharmacist would like to know if  could call in another medication.         Communication Preference:[facility]:[phone]      Additional Information:

## 2018-09-21 ENCOUNTER — TELEPHONE (OUTPATIENT)
Dept: UROLOGY | Facility: CLINIC | Age: 73
End: 2018-09-21

## 2018-09-21 NOTE — TELEPHONE ENCOUNTER
Garett patel- 9/21/18- spoke with [facility] pharmacy to see if there was another med comparable and less expensive. Pharm tech suggested estradiol 0.1 mg. Sent this info to Dr. Anderson.

## 2018-09-22 LAB — BACTERIA UR CULT: NORMAL

## 2018-09-24 ENCOUNTER — TELEPHONE (OUTPATIENT)
Dept: UROLOGY | Facility: CLINIC | Age: 73
End: 2018-09-24

## 2018-09-24 NOTE — TELEPHONE ENCOUNTER
Hi. Would you look over this lady's urine culture.     Should I try augmentin to try to clear her prior to her procedure even though it is intermediate?    Should she see you guys if not?     Her GFR is 26.     Thanks so much.     Melany

## 2018-09-24 NOTE — TELEPHONE ENCOUNTER
This urine also had the same issue. It was not collected and therefore not sent. What happened?    Can you find out?  This was supposed to be a research patient.

## 2018-09-25 ENCOUNTER — TELEPHONE (OUTPATIENT)
Dept: UROLOGY | Facility: CLINIC | Age: 73
End: 2018-09-25

## 2018-09-25 ENCOUNTER — TELEPHONE (OUTPATIENT)
Dept: INFECTIOUS DISEASES | Facility: CLINIC | Age: 73
End: 2018-09-25

## 2018-09-25 DIAGNOSIS — Z00.6 RESEARCH STUDY PATIENT: Primary | ICD-10-CM

## 2018-09-25 DIAGNOSIS — N39.46 MIXED STRESS AND URGE URINARY INCONTINENCE: Primary | ICD-10-CM

## 2018-09-25 DIAGNOSIS — N39.0 RECURRENT UTI: Primary | ICD-10-CM

## 2018-09-25 RX ORDER — GRANULES FOR ORAL 3 G/1
3 POWDER ORAL ONCE
Qty: 3 G | Refills: 0 | Status: SHIPPED | OUTPATIENT
Start: 2018-09-25 | End: 2018-09-25

## 2018-09-25 NOTE — TELEPHONE ENCOUNTER
Plans for urodynamic study on October 1  Most recent urine culture with ESBL Klebsiella    Will plan for baseline BMP, then ertapenem 1g IV qdaily x3 days in infusion center (9/26, 9/27, 9/28)

## 2018-09-25 NOTE — TELEPHONE ENCOUNTER
Needs cath urine on Thursday to see if monurol worked if she can get script today.   Patient aware it was sent to pharmacy,.     Needs FUDS. ? Maybe October 8th?

## 2018-09-26 NOTE — TELEPHONE ENCOUNTER
----- Message from Melany Anderson MD sent at 9/24/2018 10:47 AM CDT -----  Contact: [facility]:[phone]    See what options there are for a vaginal cream. Estradiol 0.1mg is not vaginal.   ----- Message -----  From: Lulu Can LPN  Sent: 9/20/2018   5:04 PM  To: Melany Anderson MD    Estradiol 0.1 mg and there is a generic for it.    ----- Message -----  From: Melany Anderson MD  Sent: 9/20/2018   1:55 PM  To: Justin PERRY Staff    Other option is premarin vaginal cream. Ask her to keep in mind that medicine will last for 2-3 months. Did the pharmacist give another suggestion.   Let me know.   ----- Message -----  From: Harman Simon  Sent: 9/20/2018   9:43 AM  To: Justin PERRY Staff    .Needs Advice    Reason for call:[facility] called and states the pt conjugated estrogens (PREMARIN) vaginal cream is to expensive and the pt can not afford it. Pharmacist would like to know if  could call in another medication.         Communication Preference:[facility]:[phone]      Additional Information:

## 2018-09-26 NOTE — TELEPHONE ENCOUNTER
Called pt x2  in an attempt to find out what medicine her insurance covers. Pt's voicemail is not working.

## 2018-09-27 ENCOUNTER — INFUSION (OUTPATIENT)
Dept: INFECTIOUS DISEASES | Facility: HOSPITAL | Age: 73
End: 2018-09-27
Attending: INTERNAL MEDICINE
Payer: MEDICARE

## 2018-09-27 ENCOUNTER — OFFICE VISIT (OUTPATIENT)
Dept: NEPHROLOGY | Facility: CLINIC | Age: 73
End: 2018-09-27
Payer: MEDICARE

## 2018-09-27 ENCOUNTER — RESEARCH ENCOUNTER (OUTPATIENT)
Dept: RESEARCH | Facility: HOSPITAL | Age: 73
End: 2018-09-27
Payer: MEDICARE

## 2018-09-27 VITALS
HEIGHT: 69 IN | BODY MASS INDEX: 39.42 KG/M2 | SYSTOLIC BLOOD PRESSURE: 118 MMHG | OXYGEN SATURATION: 98 % | HEART RATE: 73 BPM | DIASTOLIC BLOOD PRESSURE: 62 MMHG | WEIGHT: 266.13 LBS

## 2018-09-27 VITALS
HEIGHT: 69 IN | SYSTOLIC BLOOD PRESSURE: 139 MMHG | HEART RATE: 75 BPM | WEIGHT: 264.56 LBS | OXYGEN SATURATION: 98 % | RESPIRATION RATE: 16 BRPM | DIASTOLIC BLOOD PRESSURE: 60 MMHG | BODY MASS INDEX: 39.18 KG/M2 | TEMPERATURE: 98 F

## 2018-09-27 DIAGNOSIS — N17.9 AKI (ACUTE KIDNEY INJURY): ICD-10-CM

## 2018-09-27 DIAGNOSIS — E87.20 METABOLIC ACIDOSIS: ICD-10-CM

## 2018-09-27 DIAGNOSIS — N18.4 CKD (CHRONIC KIDNEY DISEASE), STAGE IV: Primary | ICD-10-CM

## 2018-09-27 DIAGNOSIS — I12.9 HYPERTENSIVE KIDNEY DISEASE WITH STAGE 4 CHRONIC KIDNEY DISEASE: ICD-10-CM

## 2018-09-27 DIAGNOSIS — N18.4 HYPERTENSIVE KIDNEY DISEASE WITH STAGE 4 CHRONIC KIDNEY DISEASE: ICD-10-CM

## 2018-09-27 DIAGNOSIS — E55.9 VITAMIN D DEFICIENCY: ICD-10-CM

## 2018-09-27 DIAGNOSIS — N25.81 SECONDARY HYPERPARATHYROIDISM: ICD-10-CM

## 2018-09-27 DIAGNOSIS — N39.46 MIXED STRESS AND URGE URINARY INCONTINENCE: Primary | ICD-10-CM

## 2018-09-27 PROCEDURE — 99213 OFFICE O/P EST LOW 20 MIN: CPT | Mod: PBBFAC,25 | Performed by: INTERNAL MEDICINE

## 2018-09-27 PROCEDURE — 99215 OFFICE O/P EST HI 40 MIN: CPT | Mod: S$PBB,,, | Performed by: INTERNAL MEDICINE

## 2018-09-27 PROCEDURE — 96365 THER/PROPH/DIAG IV INF INIT: CPT

## 2018-09-27 PROCEDURE — 99999 PR PBB SHADOW E&M-EST. PATIENT-LVL III: CPT | Mod: PBBFAC,,, | Performed by: INTERNAL MEDICINE

## 2018-09-27 PROCEDURE — 63600175 PHARM REV CODE 636 W HCPCS: Performed by: INTERNAL MEDICINE

## 2018-09-27 RX ORDER — CALCITRIOL 0.25 UG/1
0.25 CAPSULE ORAL
Qty: 30 CAPSULE | Refills: 3 | Status: SHIPPED | OUTPATIENT
Start: 2018-09-28 | End: 2019-02-12 | Stop reason: SDUPTHER

## 2018-09-27 RX ORDER — LEVOTHYROXINE SODIUM 50 UG/1
TABLET ORAL
COMMUNITY
Start: 2018-09-20 | End: 2018-10-09 | Stop reason: SDUPTHER

## 2018-09-27 RX ORDER — SODIUM BICARBONATE 325 MG/1
325 TABLET ORAL 4 TIMES DAILY
Qty: 120 TABLET | Refills: 6 | Status: SHIPPED | OUTPATIENT
Start: 2018-09-27

## 2018-09-27 RX ORDER — ERGOCALCIFEROL 1.25 MG/1
50000 CAPSULE ORAL
Qty: 12 CAPSULE | Refills: 1 | Status: SHIPPED | OUTPATIENT
Start: 2018-09-27

## 2018-09-27 RX ORDER — CALCITRIOL 0.5 UG/1
CAPSULE ORAL
COMMUNITY
Start: 2018-09-19 | End: 2018-09-27

## 2018-09-27 RX ORDER — METOPROLOL SUCCINATE 50 MG/1
50 TABLET, EXTENDED RELEASE ORAL DAILY
Qty: 30 TABLET | Refills: 11 | Status: SHIPPED | OUTPATIENT
Start: 2018-09-27 | End: 2019-11-08 | Stop reason: SDUPTHER

## 2018-09-27 RX ADMIN — ERTAPENEM SODIUM 1 G: 1 INJECTION, POWDER, LYOPHILIZED, FOR SOLUTION INTRAMUSCULAR; INTRAVENOUS at 11:09

## 2018-09-27 NOTE — PROGRESS NOTES
Study: Supplement and Effects on Multi-Drug Resistant Urinary Tract Infection  [facility] Protocol#: 2017.564    PI: Melany Anderson MD      Visit:  Screening/Day 1    1. Patient was seen for study visit. Study supplement was administered. Pill count: 52   2. All study procedures followed.   3. All questionnaires and assessments were completed.  4. Medication/Allergies/History/Antibiotic use reviewed with patient.   5. BMP completed on 9/14/18.  6. Physical Exam and Pelvic Exam performed by Dr. Anderson on 9/19/18.  7. Subject will return to clinic for Visit 2 with a nurse to collect urine via catheter 2 weeks from today: 10/11/18.     Subject started IV infusion treatment for UTI with ID today, x3 days.

## 2018-09-27 NOTE — PROGRESS NOTES
Subjective:       Patient ID: Luisana Chun is a 73 y.o. White female who presents for follow up on  Chronic Kidney Disease    HPI     Ms. Chun is seen in nephrology clinic for follow up on CKD. She was last followed on 6/27/18, she established care with our clinic on 2/25/16.     Today she arrived in the clinic accompanied by her girlfriend who she introduced as her caretaker.     Interim, pt reports she had a fall and she received some anti-inflammatory medicines from her PCP. She reports she was admitted to [facility] due to fall. She had several changes made to her medications while she was hospitalized.    Of note, she was discharged on diclofenac orally for pain control. She has been taken off Irbesartan, calcitriol, nifedipine and the dose of metoprolol XL has been reduced to 50 mg per day from 100 mg per day. She could not tell me if she had episodes of hypotension.    She has not been checking her home BP but it appears to be stable during clinic visit. Also of note, she has been diagnosed with another episode of drug resistant UTI and has been started on Ertapenem infusion by ID and urology. She has klebsiella UTI diagnosed most recently.     Most recent labs show worsening of renal function and CRISTEL superimposed on CKD IV.    Pt states she had thyroidectomy and apparently partial parathyroid removal earlier in 2018 at [facility].       She continues to feel tired. She denies dysuria/ vomiting. Diarrhea/ flank pain/ fever. She has BMI worsening to 39.    Originally she had stated she was visiting from [place] and had plans to go back there but apparently that has not been the case. She reports she has a diagnosis of CKD and had established nephrology follow up in [place]. She reports she was told of CKD due to her chronic diarrhea?. Pt has hypertension for several years, with onset in her 20's. She denies any h/o diabetes. Pt reports h/o UTI. Prior records from [place] have not been received. She reports  she has chronic diarrhea for several years but states that she had no work up done for this. She reports having leg swelling but she never had any abnormal proteinuria so etiology of leg swelling not clear.     She was enrolled in NICE study at [facility] and she had labs done from [facility] from 11/22/16. Now she reports she has been out of that study. I have not received any communication from [facility] as to the findings of her participation into the study.     In 5/17 she had pre renal azotemia due to HCTZ use and not drinking enough water. Transiently she was taken off ARB and HCTZ back then.    Prior labs showed hep C and B screen is negative, and has normal protein electrophoresis pattern. On US she has 10.5 cm kidney size. There is decreased corticomedullary differentiation and decreased cortical thickness. There is no mass/ stone/ obstruction.     Renal Function:  Lab Results   Component Value Date    GLU 97 09/27/2018    GLU 89 09/14/2018    GLU 89 09/14/2018     09/27/2018     09/14/2018     09/14/2018    K 4.8 09/27/2018    K 4.4 09/14/2018    K 4.4 09/14/2018     (H) 09/27/2018     09/14/2018     09/14/2018    CO2 17 (L) 09/27/2018    CO2 19 (L) 09/14/2018    CO2 19 (L) 09/14/2018    BUN 43 (H) 09/27/2018    BUN 33 (H) 09/14/2018    BUN 33 (H) 09/14/2018    CALCIUM 8.3 (L) 09/27/2018    CALCIUM 8.2 (L) 09/14/2018    CALCIUM 8.2 (L) 09/14/2018    CREATININE 2.2 (H) 09/27/2018    CREATININE 1.9 (H) 09/14/2018    CREATININE 1.9 (H) 09/14/2018    ALBUMIN 3.6 09/14/2018    ALBUMIN 3.5 06/15/2018    PHOS 2.8 09/14/2018    PHOS 3.3 06/15/2018    ESTGFRAFRICA 24.9 (A) 09/27/2018    ESTGFRAFRICA 30 (A) 09/14/2018    ESTGFRAFRICA 30 (A) 09/14/2018    EGFRNONAA 21.6 (A) 09/27/2018    EGFRNONAA 26 (A) 09/14/2018    EGFRNONAA 26 (A) 09/14/2018       Urinalysis:  Lab Results   Component Value Date    APPEARANCEUA Hazy (A) 09/19/2018    PHUR 5.0 09/19/2018    SPECGRAV 1.015 09/19/2018     PROTEINUA Negative 09/19/2018    GLUCUA Negative 09/19/2018    OCCULTUA Negative 09/19/2018    NITRITE Negative 09/19/2018    LEUKOCYTESUR 2+ (A) 09/19/2018       Protein/Creatinine Ratio:  Lab Results   Component Value Date    PROTEINURINE 18 09/14/2018    CREATRANDUR 84.3 09/14/2018    UTPCR 0.21 (H) 09/14/2018       CBC:  Lab Results   Component Value Date    WBC 7.00 09/14/2018    HGB 10.4 (L) 09/14/2018    HCT 33.7 (L) 09/14/2018     PTH 60.4  Vit D 28    US kidneys 6/19/18  FINDINGS:  Right kidney: The right kidney measures 11.3 x 5.8 x 5.5 cm. No mass. No renal stone. No hydronephrosis.    Left kidney: The left kidney measures 9.5 by 5.1 x 5.1 cm. No mass. No renal stone. No hydronephrosis.    Bladder prevoid residual is 160 cc and postvoid volume is 1.3 cc   Impression       No significant abnormality.         Review of Systems   Constitutional: Positive for fatigue. Negative for activity change, appetite change, chills and fever.   HENT: Negative for sneezing and sore throat.    Eyes: Negative for discharge and redness.   Respiratory: Negative for cough, shortness of breath and wheezing.    Cardiovascular: Negative for chest pain and leg swelling.   Gastrointestinal: Negative for abdominal distention, abdominal pain, blood in stool, diarrhea, nausea and vomiting.   Endocrine: Negative for polydipsia and polyuria.   Genitourinary: Negative for difficulty urinating, dysuria, flank pain, frequency and hematuria.   Musculoskeletal: Negative for gait problem and joint swelling.   Skin: Negative for pallor and rash.   Neurological: Negative for dizziness, light-headedness and headaches.   Psychiatric/Behavioral: Negative for behavioral problems. The patient is not nervous/anxious.        Objective:      Physical Exam   Constitutional: She is oriented to person, place, and time. She appears well-developed. No distress.   Neck: Neck supple.   Cardiovascular: Normal rate, regular rhythm and normal heart sounds.   No  murmur heard.  Pulmonary/Chest: Effort normal and breath sounds normal. No respiratory distress. She has no wheezes. She has no rales.   Abdominal: Soft. There is no tenderness.   Obesity+   Musculoskeletal: She exhibits edema (pedal edema ).   Neurological: She is alert and oriented to person, place, and time.   Skin: Skin is warm and dry.   Psychiatric: She has a normal mood and affect. Her behavior is normal.   Nursing note and vitals reviewed.      Assessment:       1. CKD (chronic kidney disease), stage IV    2. CRISTEL (acute kidney injury)    3. Hypertensive kidney disease with stage 4 chronic kidney disease    4. Metabolic acidosis    5. Secondary hyperparathyroidism    6. Vitamin D deficiency        Plan:     Ms. Chun has longstanding hypertension, chronic diarrhea, NSAID use, she is here for nephrology visit for CKD. She is from [place] where she had an established renal follow up. She had labs from PCP office from 12/15 which showed creatinine of around 1.3, eGFR of 40, K 5, bicarbonate 17 to 20. Her labs from [place] are not available for review but pt reports her creatinine and GFR are somewhat similar to these results. Her CKD III which has now progressed to IV is likely due to longstanding hypertension and NSAID use. We discussed about this in detail. CKD staging, potential risk of decline in renal function was discussed with her.     She has had multiple episodes of CRISTEL on CKD in different context including pyelonephritis/ UTI, over diuresis/ continued ARB use while volume depleted etc.     Defer further antihypertensive management to her PCP. For long term she would benefit by using RAAS blockade to slow down progression of her CKD as long as she does not develop CRISTEL and hyperkalemia. This was explained to her in detail. She already had CKD known to her for the past few years. Her serologic work has been negative. As such I do not see a role in offering her invasive work up like percutaneous kidney biopsy.      CRISTEL due to complicated UTI, NSAID use and prerenal azotemia. I discussed it with her in detail. Explained to her to avoid NSAID, details were reviewed with her. Also stressed to improve fluid intake. She will continue IV antibiotic per ID clinic. Plan to repeat renal panel in 2 weeks to follow on renal function closely. Stressed to follow BP at home closely, report if SBP > 140 or < 100. Currently she is on a single agent, metoprolol XL 50 mg since her hospital discharge.    Given CRISTEL would hold restarting ARB.    Will restart calcitriol 0.25 mcg Monday and Friday, trend PTH, corrected Ca    Restart sodium bicarbonate as metabolic acidosis has worsened while she was taken off it during recent hospitalization.    Over time she does have progression of CKD with multiple episodes of CRISTEL and partial recovery from such.      Follow with endocrine Dr. Lennon at  as s/p thyroidectomy and partial parathyroidectomy?    Management of complicated UTI per ID.    Stressed to her to follow Low salt diet. Follow with PCP for hypertension control. Large variations per pt but avoiding hypotension would be crucial. Hypotension can cause CRISTEL/ dizziness. Pt counseled.    Weight loss by calorie control will be beneficial. On the other hand ongoing severe obesity can lead to progression of CKD.    Trend H/H but anemia less likely to be entirely from CKD, pt to follow with her other providers to look into non-renal causes of anemia     Etiology of edema not clear. No significant proteinuria. If worsens then consider cardiac evaluation or circulatory problems.      Additional Plan:  - avoid NSAID use ever, discussed with her, printed NSAID medication list was already given to her, have stressed to avoid it due to risk of progression of CKD  - have advised to stop multivitamin tablet as it can have K, Ca, vit D and runs risk of electrolyte disturbance as a result of such preparations   - continue Ergocalciferol and trend vit D, PTH,  corrected Ca  - strict low salt diet and low K diet  - advise periodic monitoring of renal labs for electrolytes, acid base status, to rule out progression of CKD  - goal BP less than 130-140/80, advise home BP monitoring  - continue to follow with PCP for anemia management, chronic fatigue, leg edema.   - explained to her that at present she does not need protein or phos restriction but does need low salt and low K diet  - CKD staging and potential risk for progression of CKD and gradual progression of CKD d/w her and her girlfriend     RTC 2 months with pre clinic labs  Surveillance renal panel in 2 weeks and then every 2 to 4 weeks to monitor renal function closely  Plan d/w pt and her girlfriend in detail including diet, labs, follow up.

## 2018-09-27 NOTE — PLAN OF CARE
Problem: Patient Care Overview (Adult)  Goal: Plan of Care Review  Outcome: Outcome(s) achieved Date Met: 09/27/18  Pt educated on hand hygiene and infection control.

## 2018-09-28 ENCOUNTER — INFUSION (OUTPATIENT)
Dept: INFECTIOUS DISEASES | Facility: HOSPITAL | Age: 73
End: 2018-09-28
Attending: INTERNAL MEDICINE
Payer: MEDICARE

## 2018-09-28 ENCOUNTER — TELEPHONE (OUTPATIENT)
Dept: UROLOGY | Facility: CLINIC | Age: 73
End: 2018-09-28

## 2018-09-28 ENCOUNTER — TELEPHONE (OUTPATIENT)
Dept: FAMILY MEDICINE | Facility: CLINIC | Age: 73
End: 2018-09-28

## 2018-09-28 VITALS
HEART RATE: 80 BPM | BODY MASS INDEX: 39.18 KG/M2 | DIASTOLIC BLOOD PRESSURE: 75 MMHG | RESPIRATION RATE: 20 BRPM | WEIGHT: 264.56 LBS | OXYGEN SATURATION: 98 % | SYSTOLIC BLOOD PRESSURE: 146 MMHG | HEIGHT: 69 IN | TEMPERATURE: 99 F

## 2018-09-28 DIAGNOSIS — N39.46 MIXED STRESS AND URGE URINARY INCONTINENCE: Primary | ICD-10-CM

## 2018-09-28 PROCEDURE — 96365 THER/PROPH/DIAG IV INF INIT: CPT

## 2018-09-28 PROCEDURE — 63600175 PHARM REV CODE 636 W HCPCS: Performed by: INTERNAL MEDICINE

## 2018-09-28 RX ADMIN — ERTAPENEM SODIUM 1 G: 1 INJECTION, POWDER, LYOPHILIZED, FOR SOLUTION INTRAMUSCULAR; INTRAVENOUS at 11:09

## 2018-09-28 NOTE — TELEPHONE ENCOUNTER
----- Message from Yuliya Lion MA sent at 9/28/2018  9:25 AM CDT -----  Contact: Any pharmacist randy/ [facility] in [place] [phone]  Pharmacy Calling    Reason for call: Monurol is not covered by pt's insurance. Unable to know which medication is covered until a different one is called in and they can run it through pt's insurance     Pharmacy Name: [facility] in [place]    Prescription Name:  Monurol     Phone Number:  [phone] ([facility])

## 2018-09-28 NOTE — PLAN OF CARE
Problem: Health Knowledge, Opportunity to Enhance (Adult,NICU,[place],Obstetrics,Pediatric)  Goal: Identify Related Risk Factors and Signs and Symptoms  Related risk factors and signs and symptoms are identified upon initiation of [employer] Clinical Practice Guideline (CPG)  Outcome: Outcome(s) achieved Date Met: 18  Pt instructed on infection control; such as hand hygiene. Pt verbalized understanding.

## 2018-09-28 NOTE — TELEPHONE ENCOUNTER
Looks like she may have seen Marie to discuss the bariatric procedure, but never did have a preop completed.

## 2018-09-28 NOTE — TELEPHONE ENCOUNTER
Spoke with pt and informed her of msg from [facility] pharmacy and told her to get with her insurance company and [facility] to figure out what is the alternative that is covered.

## 2018-09-28 NOTE — TELEPHONE ENCOUNTER
----- Message from Waylon Marr sent at 9/28/2018  1:34 PM CDT -----  Contact: Luisana [phone]  Patient is calling to notify the staff that her clearance was not received by Dr. Feliz. She would like it refaxed. The number that it can be faxed to is [phone]. Please call at your earliest convenience.

## 2018-09-28 NOTE — TELEPHONE ENCOUNTER
Patient stated she was seen a few months ago for a pre op appt, and  never received the clearance information. I noted to patient no encounter found for pre op, patient insist that she was seen specifically for gastric bypass clearance. Please advise, thank you

## 2018-09-30 ENCOUNTER — EXTERNAL CHRONIC CARE MANAGEMENT (OUTPATIENT)
Dept: PRIMARY CARE CLINIC | Facility: CLINIC | Age: 73
End: 2018-09-30
Payer: MEDICARE

## 2018-09-30 PROCEDURE — 99490 CHRNC CARE MGMT STAFF 1ST 20: CPT | Mod: PBBFAC,PO | Performed by: FAMILY MEDICINE

## 2018-09-30 PROCEDURE — 99490 CHRNC CARE MGMT STAFF 1ST 20: CPT | Mod: S$PBB,,, | Performed by: FAMILY MEDICINE

## 2018-10-01 ENCOUNTER — TELEPHONE (OUTPATIENT)
Dept: UROLOGY | Facility: CLINIC | Age: 73
End: 2018-10-01

## 2018-10-01 ENCOUNTER — INFUSION (OUTPATIENT)
Dept: INFECTIOUS DISEASES | Facility: HOSPITAL | Age: 73
End: 2018-10-01
Attending: INTERNAL MEDICINE
Payer: MEDICARE

## 2018-10-01 ENCOUNTER — HOSPITAL ENCOUNTER (OUTPATIENT)
Facility: HOSPITAL | Age: 73
Discharge: HOME | End: 2018-10-01
Attending: UROLOGY | Admitting: UROLOGY
Payer: MEDICARE

## 2018-10-01 VITALS
SYSTOLIC BLOOD PRESSURE: 140 MMHG | SYSTOLIC BLOOD PRESSURE: 138 MMHG | TEMPERATURE: 98 F | DIASTOLIC BLOOD PRESSURE: 65 MMHG | RESPIRATION RATE: 18 BRPM | TEMPERATURE: 99 F | OXYGEN SATURATION: 97 % | WEIGHT: 264.75 LBS | BODY MASS INDEX: 39.21 KG/M2 | RESPIRATION RATE: 18 BRPM | HEART RATE: 82 BPM | DIASTOLIC BLOOD PRESSURE: 76 MMHG | HEIGHT: 69 IN | BODY MASS INDEX: 39.21 KG/M2 | OXYGEN SATURATION: 98 % | HEART RATE: 82 BPM | HEIGHT: 69 IN | WEIGHT: 264.75 LBS

## 2018-10-01 DIAGNOSIS — N32.9 BLADDER DISORDER: ICD-10-CM

## 2018-10-01 DIAGNOSIS — N39.46 MIXED STRESS AND URGE URINARY INCONTINENCE: Primary | ICD-10-CM

## 2018-10-01 DIAGNOSIS — N39.0 RECURRENT UTI: Primary | ICD-10-CM

## 2018-10-01 PROCEDURE — 36000704 HC OR TIME LEV I 1ST 15 MIN: Performed by: UROLOGY

## 2018-10-01 PROCEDURE — 27200973 HC CYSTO SUPPLY IV (URODYNAMICS): Performed by: UROLOGY

## 2018-10-01 PROCEDURE — 36000705 HC OR TIME LEV I EA ADD 15 MIN: Performed by: UROLOGY

## 2018-10-01 PROCEDURE — 51797 INTRAABDOMINAL PRESSURE TEST: CPT | Mod: 26,,, | Performed by: UROLOGY

## 2018-10-01 PROCEDURE — 51784 ANAL/URINARY MUSCLE STUDY: CPT | Mod: 26,51,, | Performed by: UROLOGY

## 2018-10-01 PROCEDURE — 96365 THER/PROPH/DIAG IV INF INIT: CPT

## 2018-10-01 PROCEDURE — 76000 FLUOROSCOPY <1 HR PHYS/QHP: CPT | Mod: 26,59,, | Performed by: UROLOGY

## 2018-10-01 PROCEDURE — 63600175 PHARM REV CODE 636 W HCPCS: Performed by: INTERNAL MEDICINE

## 2018-10-01 PROCEDURE — 51728 CYSTOMETROGRAM W/VP: CPT | Mod: 26,,, | Performed by: UROLOGY

## 2018-10-01 PROCEDURE — 51741 ELECTRO-UROFLOWMETRY FIRST: CPT | Mod: 26,51,, | Performed by: UROLOGY

## 2018-10-01 PROCEDURE — 52000 CYSTOURETHROSCOPY: CPT | Mod: 59,,, | Performed by: UROLOGY

## 2018-10-01 PROCEDURE — 27201008 HC FLEXSCOPE: Performed by: UROLOGY

## 2018-10-01 RX ADMIN — ERTAPENEM SODIUM 1 G: 1 INJECTION, POWDER, LYOPHILIZED, FOR SOLUTION INTRAMUSCULAR; INTRAVENOUS at 10:10

## 2018-10-01 NOTE — INTERVAL H&P NOTE
The patient has been examined and the H&P has been reviewed:    I concur with the findings and no changes have occurred since H&P was written.    Anesthesia/Surgery risks, benefits and alternative options discussed and understood by patient/family.          Active Hospital Problems    Diagnosis  POA    Bladder disorder [N32.9]  Yes      Resolved Hospital Problems   No resolved problems to display.

## 2018-10-01 NOTE — PLAN OF CARE
Problem: Patient Care Overview (Adult)  Goal: Plan of Care Review  Outcome: Outcome(s) achieved Date Met: 10/01/18  Pt educated on hand hygiene and infection control.

## 2018-10-01 NOTE — OP NOTE
Urodynamic Report    Indication:recurrent uti, refractory urinary incontinence    Date of Procedure: 10/01/2018      Procedure: Fluoro-urodynamics (FUDS)    Surgeon:  Melany Anderson MD    Complications: none    Urine showed no evidence of infection.    Procedure in Detail:    Patient was taken to the Urodynamic Suite with a comfortably full bladder and asked to perform a free uroflow.  Next, the patient was prepped and the urinary residual was drained with a 14 Fr catheter.  A 9 Fr dual lumen catheter was placed to measure intravesical pressures.  A 10 Fr balloon manometer was placed into the rectum for abdominal pressure measurements.  Patch EMG electrodes were placed on the perineum.  The patient was connected to the [facility] Urodynamic machine, using a multichannel technique, the data were interpreted.  The bladder was filled with sterile water at room temperature at a rate of 20-30 ml/min.  Patient is filled to 200ml.  Filling is performed with the patient in the seated  position.  The patient was then asked to go ahead and void for a pressure flow study. Periodic fluoroscopy was performed.     The following are the results of the study:  1.  Uroflow       Q max:  8.7ml/sec       Voided volume:189ml       Pattern of the curve:bell curve mostly with some plateau at the end. 21 seconds voiding.    2.  PVR: 0ml    3.  CMG       Sensation:         First Desire:49ml         Normal Desire:67ml         Strong Desire:120 ml         Urgency:146ml       Capacity:200ml       Abnormal Contractions:none, but patient was very uncomfortable       Compliance:normal    4.  Abdominal Leak Point Pressure:       Valsalva:not done, voided at 200cc       Cough:not done, voided at 200cc    5. Detrusor Leak Point Pressure. - no leak    6.  EMG: some increased activity during voiding, appeared voluntary    7. Fluoroscopy: smooth walled bladder, no reflux.     8.  Voiding phase       Q max:18.5ml/sec       P det at Q max:16        Pattern of the curve:somewhat bell curve shaped       Voided volume:206ml       PVR:50ml    7.  Analysis:  1. Decreased bladder capacity  2. Normal bladder compliance  3. Increased sensation  4. No leak noted  5. Some increased sphincter activity with voiding, although appears to be voluntary  6. Mostly complete emptying    Procedure: Flexible cysto-uretheroscopy    Pre Procedure Diagnosis:recurrent uti, refractory urgency    Post Procedure Diagnosis: same    Surgeon: Melany Anderson MD    Anesthesia: 2% uro-jet lidocaine jelly for local analgesia    Flexible cysto-urethroscopy was performed after consent was obtained.  The risks and benefits were explained.    2% lidocaine urojet was used for local analgesia.  The genitalia was prepped and draped in the sterile fashion with betadine.    The flexible scope was advanced into the urethra and into the bladder.  Bilateral ureteral orifice were evaluated and noted to be normal with clear efflux.  The bladder was completely surveyed in a systematic fashion.   No bladder tumors or lesions were seen.  No strictures were noted.    The patient tolerated the procedure well without complication.      9.  Recommendations:       They will follow up according to the trial.   Patient was treated by ID for ertapenem.   Patient is not on oxybutnin XR 15mg currently b/c of side effects of dry mouth.   Trial of myrbetriq 25mg, dose titrate to 50mg.   Patient to fill script for vaginal estrogen.

## 2018-10-02 ENCOUNTER — TELEPHONE (OUTPATIENT)
Dept: UROLOGY | Facility: CLINIC | Age: 73
End: 2018-10-02

## 2018-10-02 NOTE — TELEPHONE ENCOUNTER
----- Message from Preeti Baires sent at 10/2/2018  1:02 PM CDT -----  Contact: Self/[phone]  Patient would like to speak to the staff regarding test results. Thank you.

## 2018-10-03 ENCOUNTER — TELEPHONE (OUTPATIENT)
Dept: UROLOGY | Facility: CLINIC | Age: 73
End: 2018-10-03

## 2018-10-03 NOTE — TELEPHONE ENCOUNTER
Pt had uds with  at [facility] an states she was told that lifting her bladder may be an option but in another way can cause worse issues for pt. States would  to look at 's finding an see what he thinks. We cancelled her procedure on 11/16/18 here for uds with surgery an roxi made aware also. Please advise-gabrielle

## 2018-10-04 RX ORDER — FLUCONAZOLE 150 MG/1
150 TABLET ORAL DAILY
Qty: 1 TABLET | Refills: 0 | Status: SHIPPED | OUTPATIENT
Start: 2018-10-04 | End: 2018-10-05

## 2018-10-05 ENCOUNTER — TELEPHONE (OUTPATIENT)
Dept: FAMILY MEDICINE | Facility: CLINIC | Age: 73
End: 2018-10-05

## 2018-10-05 NOTE — TELEPHONE ENCOUNTER
Pt states she had pre-op exam around 5 months ago and Jake hasn't received clearance for her bariatric surgery; I informed pt that no where in the chart shows visit for pre-op clearance and per last message from Dr Dave she needs to schedule OV for medical clearance; pt insisted that she had pre-op exam but she did schedule OV with Dr Dave for next week

## 2018-10-05 NOTE — TELEPHONE ENCOUNTER
----- Message from Leah Soto sent at 10/5/2018  1:15 PM CDT -----  Contact: pt            Name of Who is Calling: pt      What is the request in detail: pt wants to discuss medical clearance. Please call pt       Can the clinic reply by [facility]: no      [facility] [phone]

## 2018-10-09 ENCOUNTER — OFFICE VISIT (OUTPATIENT)
Dept: FAMILY MEDICINE | Facility: CLINIC | Age: 73
End: 2018-10-09
Payer: MEDICARE

## 2018-10-09 VITALS
BODY MASS INDEX: 39.31 KG/M2 | RESPIRATION RATE: 16 BRPM | HEART RATE: 80 BPM | DIASTOLIC BLOOD PRESSURE: 70 MMHG | TEMPERATURE: 98 F | WEIGHT: 265.44 LBS | SYSTOLIC BLOOD PRESSURE: 128 MMHG | HEIGHT: 69 IN | OXYGEN SATURATION: 96 %

## 2018-10-09 DIAGNOSIS — R35.0 URINE FREQUENCY: ICD-10-CM

## 2018-10-09 DIAGNOSIS — N89.8 VAGINAL ITCHING: ICD-10-CM

## 2018-10-09 DIAGNOSIS — Z23 NEED FOR INFLUENZA VACCINATION: ICD-10-CM

## 2018-10-09 DIAGNOSIS — Z01.818 PRE-OP EXAM: Primary | ICD-10-CM

## 2018-10-09 DIAGNOSIS — R94.2 RESTRICTIVE PATTERN PRESENT ON PULMONARY FUNCTION TESTING: ICD-10-CM

## 2018-10-09 LAB
BILIRUB SERPL-MCNC: ABNORMAL MG/DL
BLOOD URINE, POC: ABNORMAL
COLOR, POC UA: YELLOW
GLUCOSE UR QL STRIP: NORMAL
KETONES UR QL STRIP: ABNORMAL
LEUKOCYTE ESTERASE URINE, POC: ABNORMAL
NITRITE, POC UA: ABNORMAL
PH, POC UA: 5
PROTEIN, POC: ABNORMAL
SPECIFIC GRAVITY, POC UA: 1.01
UROBILINOGEN, POC UA: NORMAL

## 2018-10-09 PROCEDURE — 93005 ELECTROCARDIOGRAM TRACING: CPT | Mod: PBBFAC,PO | Performed by: INTERNAL MEDICINE

## 2018-10-09 PROCEDURE — 93010 ELECTROCARDIOGRAM REPORT: CPT | Mod: S$PBB,,, | Performed by: INTERNAL MEDICINE

## 2018-10-09 PROCEDURE — 99214 OFFICE O/P EST MOD 30 MIN: CPT | Mod: S$PBB,,, | Performed by: FAMILY MEDICINE

## 2018-10-09 PROCEDURE — 81002 URINALYSIS NONAUTO W/O SCOPE: CPT | Mod: PBBFAC,PO | Performed by: FAMILY MEDICINE

## 2018-10-09 PROCEDURE — 90662 IIV NO PRSV INCREASED AG IM: CPT | Mod: PBBFAC,PO

## 2018-10-09 PROCEDURE — 99215 OFFICE O/P EST HI 40 MIN: CPT | Mod: PBBFAC,PO | Performed by: FAMILY MEDICINE

## 2018-10-09 PROCEDURE — 99999 PR PBB SHADOW E&M-EST. PATIENT-LVL V: CPT | Mod: PBBFAC,,, | Performed by: FAMILY MEDICINE

## 2018-10-09 RX ORDER — FLUOXETINE HYDROCHLORIDE 20 MG/1
CAPSULE ORAL
COMMUNITY
Start: 2018-10-06

## 2018-10-09 RX ORDER — LIOTHYRONINE SODIUM 5 UG/1
5 TABLET ORAL DAILY
COMMUNITY

## 2018-10-09 RX ORDER — FLUCONAZOLE 100 MG/1
TABLET ORAL
COMMUNITY
Start: 2018-10-04

## 2018-10-09 RX ORDER — HYDROCHLOROTHIAZIDE 25 MG/1
25 TABLET ORAL DAILY
COMMUNITY

## 2018-10-09 RX ORDER — METOPROLOL SUCCINATE 25 MG/1
TABLET, EXTENDED RELEASE ORAL
COMMUNITY
Start: 2018-09-27 | End: 2018-10-09 | Stop reason: SDUPTHER

## 2018-10-09 NOTE — PROGRESS NOTES
Preop Note      SUBJECTIVE:     Luisana Chun is a 73 y.o. female who presents to the office today for a preoperative consultation at the request of surgeon Blanca  ([phone]) who plans on performing gastric sleeve on TBD    . This consultation is requested for the specific conditions prompting preoperative evaluation (i.e. because of potential affect on operative risk): .    CARDIAC:  HTN    PULMONARY:  KING, Restrictive lung disease    NUTRITION:    Obesity    ANESTHESIA:  No h/o or fam h/o issues with anesthesia    SOCIAL:    None    Review of Systems:  Review of Systems   Constitutional: Negative for chills and fever.   HENT: Negative for congestion and ear discharge.    Eyes: Negative for double vision and pain.   Respiratory: Negative for cough, shortness of breath and wheezing.    Cardiovascular: Negative for chest pain and palpitations.   Gastrointestinal: Negative for blood in stool and vomiting.   Genitourinary: Negative for dysuria and urgency.   Musculoskeletal: Positive for joint pain.   Skin: Negative for itching and rash.   Neurological: Negative for focal weakness, seizures and loss of consciousness.   Psychiatric/Behavioral: Negative for depression. The patient is not nervous/anxious.          Review of patient's allergies indicates:   Allergen Reactions    Amlodipine      Ankle swelling     Current Outpatient Medications on File Prior to Visit   Medication Sig Dispense Refill    azelastine (ASTELIN) 137 mcg (0.1 %) nasal spray 1 spray (137 mcg total) by Nasal route 2 (two) times daily. 30 mL 0    calcitRIOL (ROCALTROL) 0.25 MCG Cap Take 1 capsule (0.25 mcg total) by mouth every Monday and Friday. 30 capsule 3    conjugated estrogens (PREMARIN) vaginal cream Pea size amount to urethral area every 2 weeks, then 3x / week 45 g 6    docosahexanoic acid/epa (FISH OIL ORAL) Take by mouth once daily.      ergocalciferol (ERGOCALCIFEROL) 50,000 unit Cap Take 1 capsule (50,000 Units total)  by mouth every 7 days. 12 capsule 1    fluconazole (DIFLUCAN) 100 MG tablet       FLUoxetine (PROZAC) 20 MG capsule       fluticasone (FLONASE) 50 mcg/actuation nasal spray 1 spray (50 mcg total) by Each Nare route 2 (two) times daily. 1 Bottle 11    hydroCHLOROthiazide (HYDRODIURIL) 25 MG tablet Take 25 mg by mouth once daily.      irbesartan (AVAPRO) 300 MG tablet TAKE ONE TABLET BY MOUTH IN THE EVENING 90 tablet 3    liothyronine (CYTOMEL) 5 MCG Tab Take 5 mcg by mouth once daily.      metoprolol succinate (TOPROL-XL) 50 MG 24 hr tablet Take 1 tablet (50 mg total) by mouth once daily. For high blood pressure 30 tablet 11    mirabegron (MYRBETRIQ) 25 mg Tb24 ER tablet Take 1 tablet (25 mg total) by mouth once daily. 30 tablet 11    SYNTHROID 200 mcg tablet       traZODone (DESYREL) 300 MG tablet Take 2 tablets (600 mg total) by mouth every evening. 60 tablet 3    zinc (CHELATED ZINC) 50 mg Tab Take by mouth once daily.      FERROUS SULFATE ORAL Take 28 mg by mouth once daily.      GUAIFENESIN ORAL Take 400 mg by mouth as needed.      Lactobacillus acidophilus (INV PROBIOTIC/PLACEBO) capsule Take 1 capsule (1 each total) by mouth once daily. FOR INVESTIGATIONAL USE ONLY. Patient Study #13 . 52 capsule 0    levocetirizine (XYZAL) 5 MG tablet       NIFEdipine (PROCARDIA-XL) 30 MG (OSM) 24 hr tablet Take 1 tablet (30 mg total) by mouth once daily. 30 tablet 11    oxybutynin (DITROPAN XL) 15 MG TR24 Take 1 tablet (15 mg total) by mouth once daily. 30 tablet 11    PHENYLEPH/MINERAL OIL/PETROLAT (PREPARATION H RECT) Place rectally once daily.      sodium bicarbonate 325 MG tablet Take 1 tablet (325 mg total) by mouth 4 (four) times daily. 120 tablet 6    sucralfate (CARAFATE) 1 gram tablet TAKE 1 TABLET BY MOUTH THREE TIMES DAILY FOR ACID REFLUX 90 tablet 1    triamcinolone acetonide 0.1% (KENALOG) 0.1 % cream       triamcinolone acetonide 0.1% (KENALOG) 0.1 % cream APPLY TOPICALLY 2 TIMES DAILY 45  g 1    [DISCONTINUED] FLUoxetine (PROZAC) 40 MG capsule Take 2 capsules (80 mg total) by mouth once daily. 180 capsule 0    [DISCONTINUED] levothyroxine (SYNTHROID) 150 MCG tablet Take 150 mcg by mouth 3 (three) times daily.      [DISCONTINUED] levothyroxine (SYNTHROID) 50 MCG tablet       [DISCONTINUED] metoprolol succinate (TOPROL-XL) 25 MG 24 hr tablet        No current facility-administered medications on file prior to visit.      Past Medical History:   Diagnosis Date    Hypertension 1968    Kidney disease, chronic, stage III (GFR 30-59 ml/min) 2010    Obesity     Osteoporosis 2016    Thyroid cancer 2017    Thyroid disease 2013     Past Surgical History:   Procedure Laterality Date    APPENDECTOMY  1985    BACK SURGERY  february 2016    [id number]-KT N/A 6/10/2016    Performed by [facility] at [facility] OR [address] [place] [address]    COLONOSCOPY      DILATION AND CURETTAGE OF UTERUS  1996    ESOPHAGOGASTRODUODENOSCOPY      FACIAL COSMETIC SURGERY  1980, 2005    FLUOROSCOPIC URODYNAMIC STUDY N/A 10/1/2018    Procedure: URODYNAMIC STUDY, FLUOROSCOPIC;  Surgeon: Melany Anderson MD;  Location: [facility] OR [address] [place] [address];  Service: Urology;  Laterality: N/A;  1 hour    KNEE SURGERY Bilateral 1995, 2010    left toe Left 1995    SKIN SURGERY Right 05/2018    Right Shoulder    TONSILLECTOMY  1955    tumor biopsy thyroid      URODYNAMIC STUDY, FLUOROSCOPIC N/A 10/1/2018    Performed by Melany Anderson MD at [facility] OR [address] [place] [address]     Family History   Problem Relation Age of Onset    Hypertension Mother     Heart disease Mother     Macular degeneration Mother     Hypertension Father     Cataracts Father     Glaucoma Father     Macular degeneration Father     Glaucoma Brother     Macular degeneration Brother     No Known Problems Sister     No Known Problems Maternal Aunt     No Known Problems Maternal Uncle     No Known Problems Paternal Aunt     No Known Problems Paternal Uncle     Macular  degeneration Maternal Grandmother     Macular degeneration Maternal Grandfather     Macular degeneration Paternal Grandmother     Glaucoma Paternal Grandfather     Macular degeneration Paternal Grandfather     Amblyopia Neg Hx     Blindness Neg Hx     Cancer Neg Hx     Diabetes Neg Hx     Retinal detachment Neg Hx     Strabismus Neg Hx     Stroke Neg Hx     Thyroid disease Neg Hx       Social History     Socioeconomic History    Marital status:      Spouse name: Not on file    Number of children: Not on file    Years of education: Not on file    Highest education level: Not on file   Social Needs    Financial resource strain: Not on file    Food insecurity - worry: Not on file    Food insecurity - inability: Not on file    Transportation needs - medical: Not on file    Transportation needs - non-medical: Not on file   Occupational History    Not on file   Tobacco Use    Smoking status: Former Smoker    Smokeless tobacco: Never Used   Substance and Sexual Activity    Alcohol use: Yes     Alcohol/week: 0.0 oz     Comment: social    Drug use: No    Sexual activity: Not Currently     Partners: Male     Comment: 5/4/17 single   Other Topics Concern    Not on file   Social History Narrative    Not on file         OBJECTIVE:     Vital Signs (Most Recent)  Temp: 98.2 °F (36.8 °C) (10/09/18 1046)  Pulse: 80 (10/09/18 1046)  Resp: 16 (10/09/18 1046)  BP: 128/70 (10/09/18 1046)  SpO2: 96 % (10/09/18 1046)      Physical Exam:  Physical Exam   Constitutional: She is oriented to person, place, and time and well-developed, well-nourished, and in no distress. No distress.   Morbid Obesity   HENT:   Head: Normocephalic.   Eyes: EOM are normal. Pupils are equal, round, and reactive to light. Right eye exhibits no discharge. Left eye exhibits no discharge. No scleral icterus.   Cardiovascular: Normal rate, normal heart sounds and intact distal pulses. Exam reveals no gallop and no friction rub.    No murmur heard.  Pulmonary/Chest: Effort normal and breath sounds normal. No respiratory distress. She has no wheezes. She has no rales.   Abdominal: Soft. Bowel sounds are normal. She exhibits no distension. There is no tenderness. There is no rebound.   Musculoskeletal: She exhibits edema (+1 bilateral). She exhibits no deformity.   Neurological: She is alert and oriented to person, place, and time. GCS score is 15.   Skin: Skin is warm and dry. She is not diaphoretic.   Psychiatric: Mood and affect normal.   Vitals reviewed.          Diagnostic Results:  EKG as read by me is NSR @ ~70bpm, nl axis, no significant twave changes, no acute MI.    Labs pending       ASSESSMENT/PLAN:     Pre-op exam  -     Comprehensive metabolic panel; Future; Expected date: 10/09/2018  -     EKG 12-lead    Need for influenza vaccination  -     Influenza - High Dose (65+) (PF) (IM)    Urine frequency  -     POCT URINE DIPSTICK WITHOUT MICROSCOPE    Vaginal itching  -     Ambulatory referral to Gynecology    Restrictive pattern present on pulmonary function testing        White Score      HISTORY  Age >70 years (5 points)  Myocardial infarction within 6 months (10 points).    CARDIAC EXAM  Signs of CHF: ventricular gallop or JVD (11 points)  Significant aortic stenosis (3 points)    EKG  Arrhythmia other than sinus or premature atrial contractions (7 points)  5 or more PVCs per minute (7 points)    GENERAL MED CONDITIONS  PO2 <60 mmHg; PCO2 >50 mmHg; K <3 mEq/L; HCO3 <20 mEq/L; BUN >50 mg/dL; Creatinine >3 mg/dL; elevated SGOT; chronic liver disease; bedridden (3 points)    OPERATION  Emergency (4 points)  Intraperitoneal, intrathoracic or aortic (3 points)        0 to 5 Points: Class I 1% Complications   6 to 12 Points: Class II 7% Complications   13 to 25 Points: Class III 14% Complications   26 to 53 Points: Class IV 78% Complications     White Score: 5      Surgery-Related Predictors for Risk of Perioperative Cardiac  Complications  High risk   Emergency surgery   Anticipated increased blood loss   Aortic or peripheral vascular surgery     Intermediate risk   Abdominal or thoracic surgery   Head and neck surgery   Carotid endarterectomy   Orthopedic surgery   Prostate surgery     Low risk   Breast surgery   Cataract surgery   Superficial surgery   Endoscopy                                                                                               Luisana Chun is a 73 y.o. female who  has a past medical history of Hypertension (1968), Kidney disease, chronic, stage III (GFR 30-59 ml/min) (2010), Obesity, Osteoporosis (2016), Thyroid cancer (2017), and Thyroid disease (2013).   who presented for pre op evaluation. The primary encounter diagnosis was Pre-op exam. Diagnoses of Need for influenza vaccination, Urine frequency, Vaginal itching, and Restrictive pattern present on pulmonary function testing were also pertinent to this visit.    As with all procedures, there is inherent risk of multiple complications including those related to bleeding, infectious, cardiac, and pulmonary      Pt is considered to be low (acceptable) risk (White's Class I for age > 70 )  for an intermediate risk procedure. ~ 1% complication rate. Pt is aware that her kidney disease and age do place her at a higher risk of complications. She is to receive clearance from pulmonology and nephrology prior to surgery. Pt verbalized understanding. Consider increasing hydration after surgery to protect her kidneys and encourage incentive spirometry / deep breathing exercises post op.     Preop Evaluation      10/9/2018 Scott Dave MD  11:48 AM

## 2018-10-10 ENCOUNTER — TELEPHONE (OUTPATIENT)
Dept: UROLOGY | Facility: CLINIC | Age: 73
End: 2018-10-10

## 2018-10-10 NOTE — TELEPHONE ENCOUNTER
Spoke to pt who had me a little confused because  did her uds an treating her for uti. When I advised her per  stating he agrees with her findings an pt should follow up with  she stated she doesn't want  to do her bladder lift she only wanted  to do procedure an stated the only reason she got  do to uds was because she could do it sooner. Should pt continue to with  or come in to see  to discuss procedure.-gabrielle

## 2018-10-10 NOTE — TELEPHONE ENCOUNTER
Spoke to pt advised her per  since he  rarely does these types of procedures he would recommend she sees either  or  to have it done. Pt states she will see a doctor at [facility]

## 2018-10-11 ENCOUNTER — TELEPHONE (OUTPATIENT)
Dept: RESEARCH | Facility: HOSPITAL | Age: 73
End: 2018-10-11

## 2018-10-11 ENCOUNTER — LAB VISIT (OUTPATIENT)
Dept: LAB | Facility: HOSPITAL | Age: 73
End: 2018-10-11
Attending: INTERNAL MEDICINE
Payer: MEDICARE

## 2018-10-11 DIAGNOSIS — N18.4 CKD (CHRONIC KIDNEY DISEASE), STAGE IV: ICD-10-CM

## 2018-10-11 LAB
ALBUMIN SERPL BCP-MCNC: 3.6 G/DL
ALBUMIN SERPL BCP-MCNC: 3.6 G/DL
ANION GAP SERPL CALC-SCNC: 7 MMOL/L
ANION GAP SERPL CALC-SCNC: 7 MMOL/L
BASOPHILS # BLD AUTO: 0.02 K/UL
BASOPHILS NFR BLD: 0.3 %
BUN SERPL-MCNC: 28 MG/DL
BUN SERPL-MCNC: 28 MG/DL
CALCIUM SERPL-MCNC: 8.5 MG/DL
CALCIUM SERPL-MCNC: 8.5 MG/DL
CHLORIDE SERPL-SCNC: 111 MMOL/L
CHLORIDE SERPL-SCNC: 111 MMOL/L
CO2 SERPL-SCNC: 20 MMOL/L
CO2 SERPL-SCNC: 20 MMOL/L
CREAT SERPL-MCNC: 1.8 MG/DL
CREAT SERPL-MCNC: 1.8 MG/DL
DIFFERENTIAL METHOD: ABNORMAL
EOSINOPHIL # BLD AUTO: 0.2 K/UL
EOSINOPHIL NFR BLD: 3.5 %
ERYTHROCYTE [DISTWIDTH] IN BLOOD BY AUTOMATED COUNT: 14.6 %
EST. GFR  (AFRICAN AMERICAN): 31.7 ML/MIN/1.73 M^2
EST. GFR  (AFRICAN AMERICAN): 31.7 ML/MIN/1.73 M^2
EST. GFR  (NON AFRICAN AMERICAN): 27.5 ML/MIN/1.73 M^2
EST. GFR  (NON AFRICAN AMERICAN): 27.5 ML/MIN/1.73 M^2
FERRITIN SERPL-MCNC: 96 NG/ML
GLUCOSE SERPL-MCNC: 98 MG/DL
GLUCOSE SERPL-MCNC: 98 MG/DL
HCT VFR BLD AUTO: 35.9 %
HGB BLD-MCNC: 10.5 G/DL
IMM GRANULOCYTES # BLD AUTO: 0.01 K/UL
IMM GRANULOCYTES NFR BLD AUTO: 0.1 %
IRON SERPL-MCNC: 52 UG/DL
LYMPHOCYTES # BLD AUTO: 1.3 K/UL
LYMPHOCYTES NFR BLD: 18.5 %
MCH RBC QN AUTO: 29.2 PG
MCHC RBC AUTO-ENTMCNC: 29.2 G/DL
MCV RBC AUTO: 100 FL
MONOCYTES # BLD AUTO: 0.6 K/UL
MONOCYTES NFR BLD: 8.2 %
NEUTROPHILS # BLD AUTO: 4.8 K/UL
NEUTROPHILS NFR BLD: 69.4 %
NRBC BLD-RTO: 0 /100 WBC
PHOSPHATE SERPL-MCNC: 3.2 MG/DL
PHOSPHATE SERPL-MCNC: 3.2 MG/DL
PLATELET # BLD AUTO: 231 K/UL
PMV BLD AUTO: 11 FL
POTASSIUM SERPL-SCNC: 4.6 MMOL/L
POTASSIUM SERPL-SCNC: 4.6 MMOL/L
PTH-INTACT SERPL-MCNC: 96 PG/ML
RBC # BLD AUTO: 3.6 M/UL
SATURATED IRON: 16 %
SODIUM SERPL-SCNC: 138 MMOL/L
SODIUM SERPL-SCNC: 138 MMOL/L
TOTAL IRON BINDING CAPACITY: 320 UG/DL
TRANSFERRIN SERPL-MCNC: 216 MG/DL
WBC # BLD AUTO: 6.92 K/UL

## 2018-10-11 PROCEDURE — 85025 COMPLETE CBC W/AUTO DIFF WBC: CPT

## 2018-10-11 PROCEDURE — 83540 ASSAY OF IRON: CPT

## 2018-10-11 PROCEDURE — 82728 ASSAY OF FERRITIN: CPT

## 2018-10-11 PROCEDURE — 80069 RENAL FUNCTION PANEL: CPT

## 2018-10-11 PROCEDURE — 36415 COLL VENOUS BLD VENIPUNCTURE: CPT | Mod: PO

## 2018-10-11 PROCEDURE — 83970 ASSAY OF PARATHORMONE: CPT

## 2018-10-11 NOTE — TELEPHONE ENCOUNTER
----- Message from Scott Dave MD sent at 5/29/2017  5:10 PM CDT -----  Please notify patient results are abnormal. Nothing emergent needs to be done. REPEAT SCAN IN ONE YEAR.  Thanks.   RX brought to the  for .Susan Ortega MA/TC

## 2018-10-15 ENCOUNTER — TELEPHONE (OUTPATIENT)
Dept: ADMINISTRATIVE | Facility: HOSPITAL | Age: 73
End: 2018-10-15

## 2018-10-16 ENCOUNTER — TELEPHONE (OUTPATIENT)
Dept: NEPHROLOGY | Facility: CLINIC | Age: 73
End: 2018-10-16

## 2018-10-16 NOTE — TELEPHONE ENCOUNTER
[facility] does not work/ mailing letter for patient to return call to clinic to schedule referral

## 2018-10-26 ENCOUNTER — TELEPHONE (OUTPATIENT)
Dept: UROLOGY | Facility: CLINIC | Age: 73
End: 2018-10-26

## 2018-10-26 ENCOUNTER — TELEPHONE (OUTPATIENT)
Dept: RESEARCH | Facility: HOSPITAL | Age: 73
End: 2018-10-26

## 2018-10-26 ENCOUNTER — RESEARCH ENCOUNTER (OUTPATIENT)
Dept: RESEARCH | Facility: HOSPITAL | Age: 73
End: 2018-10-26

## 2018-10-26 DIAGNOSIS — N30.90 BLADDER INFECTION: Primary | ICD-10-CM

## 2018-10-26 RX ORDER — GRANULES FOR ORAL 3 G/1
3 POWDER ORAL ONCE
Qty: 3 G | Refills: 0 | Status: SHIPPED | OUTPATIENT
Start: 2018-10-26 | End: 2018-10-26

## 2018-10-26 NOTE — TELEPHONE ENCOUNTER
Patient had called Dr. Anderson requesting abx as she is out of town and thinks she has a UTI.  She had a ESBL Klebsiella pneumoniae only sensitive to IV Abx.      Monurol was sent to her preferred pharmacy in [place]

## 2018-10-26 NOTE — PROGRESS NOTES
Study: Supplement and Effects on Multi-Drug Resistant Urinary Tract Infection  Protocol#: 2017.564  PI: Melany Anderson MD    Visit: Week 4 - Phone Call    Patient was called for study visit.  Confirmed continued participation in the study.    1. Patient reports active symptoms of UTI.  She has burning and frequency.  Traveling in [place] and requested antibiotics prescription to be sent to pharmacy in [place]. Informed Dr. Anderson.   2. Patient reports she's taking supplement per instructions. 100% compliance.   3. Antibiotic use reviewed with patient and confirmed completion of course.   4. Patient confirmed no new concomitant medications.   5. Patient denied any adverse events or serious adverse events since last visit. Confirmed yeast infection has resolved.     All study procedures followed.    Note: Patient    Patient will return to clinic for Visit Week 6 on 09 Nov 2018.

## 2018-10-31 ENCOUNTER — EXTERNAL CHRONIC CARE MANAGEMENT (OUTPATIENT)
Dept: PRIMARY CARE CLINIC | Facility: CLINIC | Age: 73
End: 2018-10-31
Payer: MEDICARE

## 2018-10-31 PROCEDURE — 99490 CHRNC CARE MGMT STAFF 1ST 20: CPT | Mod: S$PBB,,, | Performed by: FAMILY MEDICINE

## 2018-10-31 PROCEDURE — 99490 CHRNC CARE MGMT STAFF 1ST 20: CPT | Mod: PBBFAC,PO | Performed by: FAMILY MEDICINE

## 2018-11-01 ENCOUNTER — LAB VISIT (OUTPATIENT)
Dept: LAB | Facility: HOSPITAL | Age: 73
End: 2018-11-01
Attending: INTERNAL MEDICINE
Payer: MEDICARE

## 2018-11-01 DIAGNOSIS — N18.4 CKD (CHRONIC KIDNEY DISEASE), STAGE IV: ICD-10-CM

## 2018-11-01 DIAGNOSIS — N17.9 AKI (ACUTE KIDNEY INJURY): ICD-10-CM

## 2018-11-01 LAB
ALBUMIN SERPL BCP-MCNC: 3.6 G/DL
ALBUMIN SERPL BCP-MCNC: 3.6 G/DL
ANION GAP SERPL CALC-SCNC: 7 MMOL/L
ANION GAP SERPL CALC-SCNC: 7 MMOL/L
BUN SERPL-MCNC: 32 MG/DL
BUN SERPL-MCNC: 32 MG/DL
CALCIUM SERPL-MCNC: 8.8 MG/DL
CALCIUM SERPL-MCNC: 8.8 MG/DL
CHLORIDE SERPL-SCNC: 109 MMOL/L
CHLORIDE SERPL-SCNC: 109 MMOL/L
CO2 SERPL-SCNC: 22 MMOL/L
CO2 SERPL-SCNC: 22 MMOL/L
CREAT SERPL-MCNC: 1.8 MG/DL
CREAT SERPL-MCNC: 1.8 MG/DL
EST. GFR  (AFRICAN AMERICAN): 31.7 ML/MIN/1.73 M^2
EST. GFR  (AFRICAN AMERICAN): 31.7 ML/MIN/1.73 M^2
EST. GFR  (NON AFRICAN AMERICAN): 27.5 ML/MIN/1.73 M^2
EST. GFR  (NON AFRICAN AMERICAN): 27.5 ML/MIN/1.73 M^2
GLUCOSE SERPL-MCNC: 94 MG/DL
GLUCOSE SERPL-MCNC: 94 MG/DL
PHOSPHATE SERPL-MCNC: 4 MG/DL
PHOSPHATE SERPL-MCNC: 4 MG/DL
POTASSIUM SERPL-SCNC: 4.9 MMOL/L
POTASSIUM SERPL-SCNC: 4.9 MMOL/L
SODIUM SERPL-SCNC: 138 MMOL/L
SODIUM SERPL-SCNC: 138 MMOL/L

## 2018-11-01 PROCEDURE — 80069 RENAL FUNCTION PANEL: CPT

## 2018-11-01 PROCEDURE — 36415 COLL VENOUS BLD VENIPUNCTURE: CPT | Mod: PO

## 2018-11-02 ENCOUNTER — TELEPHONE (OUTPATIENT)
Dept: NEPHROLOGY | Facility: CLINIC | Age: 73
End: 2018-11-02

## 2018-11-06 ENCOUNTER — TELEPHONE (OUTPATIENT)
Dept: RESEARCH | Facility: HOSPITAL | Age: 73
End: 2018-11-06

## 2018-11-06 DIAGNOSIS — Z00.6 RESEARCH STUDY PATIENT: Primary | ICD-10-CM

## 2018-11-07 ENCOUNTER — RESEARCH ENCOUNTER (OUTPATIENT)
Dept: RESEARCH | Facility: HOSPITAL | Age: 73
End: 2018-11-07
Payer: MEDICARE

## 2018-11-07 NOTE — PROGRESS NOTES
Supplement and Effects Probiotic Study  Dr. Melany Anderson  IRB# 2017.564      Subject # [id number]     Date of Visit: 7 Nov 2018    Week 6 Visit    Once determining subject would like to continue participation in this study, The following procedures/tasks were completed:    1. Subject does still meet all Inclusion criteria and no Exclusion criteria.  2. Subject's vital signs were not recorded. Subject reports no UTI symptoms.  3. Urine POCT test was not completed  4. Urine pregnancy test was not applicable.  5. An assessment for any AEs/SAEs was performed. Subject has not reported any AEs.  6. Concomitant medications were reviewed with the subject. There are no changes.  7. A review of supplement compliance was completed. The subject reports 100% compliance. Subject did not return bottle to pharmacy.  8. A review of any antibiotics used since the last visit was completed. Subject reports no antibiotic use.  9. An assessment of subject's stool was completed using the [place] Stool Chart. Subject reports Type 4.  10. Subject has completed the Visit Questionnaire without help.  11. Study drug was dispensed to the subject. Subject did not have any questions.  12. Week 12 visit on 11 Dec 2018.

## 2018-11-08 ENCOUNTER — TELEPHONE (OUTPATIENT)
Dept: ADMINISTRATIVE | Facility: HOSPITAL | Age: 73
End: 2018-11-08

## 2018-11-08 NOTE — TELEPHONE ENCOUNTER
----- Message from Stacy Casper sent at 10/30/2018  4:05 PM CDT -----  Contact: self [phone]  Calling in regards to the letter she received in the mail dated on Oct 16.

## 2018-11-30 ENCOUNTER — EXTERNAL CHRONIC CARE MANAGEMENT (OUTPATIENT)
Dept: PRIMARY CARE CLINIC | Facility: CLINIC | Age: 73
End: 2018-11-30
Payer: MEDICARE

## 2018-11-30 PROCEDURE — 99490 CHRNC CARE MGMT STAFF 1ST 20: CPT | Mod: S$PBB,,, | Performed by: FAMILY MEDICINE

## 2018-11-30 PROCEDURE — 99490 CHRNC CARE MGMT STAFF 1ST 20: CPT | Mod: PBBFAC,PO | Performed by: FAMILY MEDICINE

## 2018-12-03 ENCOUNTER — TELEPHONE (OUTPATIENT)
Dept: UROLOGY | Facility: CLINIC | Age: 73
End: 2018-12-03

## 2018-12-03 NOTE — TELEPHONE ENCOUNTER
----- Message from Kathleen Washington sent at 12/3/2018  2:12 PM CST -----  Contact: Self   Pt calling to speak to a nurse regarding health. [phone]

## 2018-12-03 NOTE — TELEPHONE ENCOUNTER
Patient called in reference to follow up that was scheduled as a follow up to SUDS. Patient has been being treated by Dr. Anderson since September and claims that the office did not want to continue treating her. Explained to patient that was not the case. Dates that the SUDS could be completed were not agreeable to the patient and she sought a sooner date with Dr. Anderson. Since that office has been following her and completed all the testing, explained that she should continue to follow up with their office for that issue. She is welcome to schedule an appointment with our office, however, I am unsure if provider will take over care since Dr. Anderson is actively treating.

## 2018-12-04 ENCOUNTER — TELEPHONE (OUTPATIENT)
Dept: UROLOGY | Facility: CLINIC | Age: 73
End: 2018-12-04

## 2018-12-04 NOTE — TELEPHONE ENCOUNTER
----- Message from Makenna Bradshaw sent at 12/4/2018  9:07 AM CST -----  Contact: Self  Patient returned call. Please call at [phone]

## 2018-12-04 NOTE — TELEPHONE ENCOUNTER
Spoke to pt appt with rhonda glasgow was cancelled for tomorrow advised pt she had already got her results from . gabrielle

## 2018-12-06 ENCOUNTER — LAB VISIT (OUTPATIENT)
Dept: LAB | Facility: HOSPITAL | Age: 73
End: 2018-12-06
Attending: FAMILY MEDICINE
Payer: MEDICARE

## 2018-12-06 DIAGNOSIS — Z01.818 PRE-OP EXAM: ICD-10-CM

## 2018-12-06 LAB
ALBUMIN SERPL BCP-MCNC: 3.5 G/DL
ALP SERPL-CCNC: 122 U/L
ALT SERPL W/O P-5'-P-CCNC: 21 U/L
ANION GAP SERPL CALC-SCNC: 9 MMOL/L
AST SERPL-CCNC: 17 U/L
BILIRUB SERPL-MCNC: 0.4 MG/DL
BUN SERPL-MCNC: 31 MG/DL
CALCIUM SERPL-MCNC: 8.4 MG/DL
CHLORIDE SERPL-SCNC: 112 MMOL/L
CO2 SERPL-SCNC: 19 MMOL/L
CREAT SERPL-MCNC: 1.7 MG/DL
EST. GFR  (AFRICAN AMERICAN): 34 ML/MIN/1.73 M^2
EST. GFR  (NON AFRICAN AMERICAN): 29.5 ML/MIN/1.73 M^2
GLUCOSE SERPL-MCNC: 95 MG/DL
POTASSIUM SERPL-SCNC: 4.6 MMOL/L
PROT SERPL-MCNC: 6.8 G/DL
SODIUM SERPL-SCNC: 140 MMOL/L

## 2018-12-06 PROCEDURE — 80053 COMPREHEN METABOLIC PANEL: CPT

## 2018-12-06 PROCEDURE — 36415 COLL VENOUS BLD VENIPUNCTURE: CPT | Mod: PO

## 2018-12-07 ENCOUNTER — PES CALL (OUTPATIENT)
Dept: ADMINISTRATIVE | Facility: CLINIC | Age: 73
End: 2018-12-07

## 2018-12-07 ENCOUNTER — TELEPHONE (OUTPATIENT)
Dept: FAMILY MEDICINE | Facility: CLINIC | Age: 73
End: 2018-12-07

## 2018-12-07 NOTE — TELEPHONE ENCOUNTER
----- Message from Scott Dave MD sent at 12/7/2018  7:44 AM CST -----  Labs are stable. Her GFR, which is a marker for kidney function is around 30.  Calcium is a little low. Can she start taking an otc calcium supplment with 600 mg clacium? Thanks.

## 2018-12-07 NOTE — TELEPHONE ENCOUNTER
Notified patient of information below. Verbalized understanding. Patient stated already taking Rx calcium already. PCP notified. Patient would like to know results from Dr Soto office as well. Notified patient to call office to advise of results.

## 2018-12-11 ENCOUNTER — TELEPHONE (OUTPATIENT)
Dept: FAMILY MEDICINE | Facility: CLINIC | Age: 73
End: 2018-12-11

## 2018-12-11 NOTE — TELEPHONE ENCOUNTER
Spoke with patient. Informed of we do not have any record of an echo cardiogram and advised to contact cardiologist office. Verbalized understanding.

## 2018-12-11 NOTE — TELEPHONE ENCOUNTER
----- Message from Lacy Smith sent at 12/11/2018 12:31 PM CST -----  Contact: Self   Patient would like to know if she has had a Echo Cardiogram. Please call at [phone].

## 2018-12-12 DIAGNOSIS — Z00.6 RESEARCH STUDY PATIENT: Primary | ICD-10-CM

## 2018-12-13 ENCOUNTER — LAB VISIT (OUTPATIENT)
Dept: LAB | Facility: HOSPITAL | Age: 73
End: 2018-12-13
Attending: UROLOGY
Payer: MEDICARE

## 2018-12-13 DIAGNOSIS — N39.0 URINARY TRACT INFECTION WITHOUT HEMATURIA, SITE UNSPECIFIED: ICD-10-CM

## 2018-12-13 DIAGNOSIS — N39.0 URINARY TRACT INFECTION WITHOUT HEMATURIA, SITE UNSPECIFIED: Primary | ICD-10-CM

## 2018-12-13 LAB
BACTERIA #/AREA URNS AUTO: ABNORMAL /HPF
BILIRUB UR QL STRIP: NEGATIVE
CLARITY UR REFRACT.AUTO: CLEAR
COLOR UR AUTO: YELLOW
GLUCOSE UR QL STRIP: NEGATIVE
HGB UR QL STRIP: NEGATIVE
KETONES UR QL STRIP: NEGATIVE
LEUKOCYTE ESTERASE UR QL STRIP: ABNORMAL
MICROSCOPIC COMMENT: ABNORMAL
NITRITE UR QL STRIP: NEGATIVE
PH UR STRIP: 5 [PH] (ref 5–8)
PROT UR QL STRIP: NEGATIVE
RBC #/AREA URNS AUTO: 0 /HPF (ref 0–4)
SP GR UR STRIP: 1.01 (ref 1–1.03)
SQUAMOUS #/AREA URNS AUTO: 0 /HPF
URN SPEC COLLECT METH UR: ABNORMAL
WBC #/AREA URNS AUTO: 10 /HPF (ref 0–5)

## 2018-12-13 PROCEDURE — 87086 URINE CULTURE/COLONY COUNT: CPT

## 2018-12-13 PROCEDURE — 87186 SC STD MICRODIL/AGAR DIL: CPT

## 2018-12-13 PROCEDURE — 87077 CULTURE AEROBIC IDENTIFY: CPT

## 2018-12-13 PROCEDURE — 81001 URINALYSIS AUTO W/SCOPE: CPT

## 2018-12-13 PROCEDURE — 87088 URINE BACTERIA CULTURE: CPT

## 2018-12-16 LAB — BACTERIA UR CULT: NORMAL

## 2018-12-17 ENCOUNTER — PATIENT MESSAGE (OUTPATIENT)
Dept: NEPHROLOGY | Facility: CLINIC | Age: 73
End: 2018-12-17

## 2018-12-17 ENCOUNTER — PATIENT MESSAGE (OUTPATIENT)
Dept: UROLOGY | Facility: CLINIC | Age: 73
End: 2018-12-17

## 2018-12-17 ENCOUNTER — TELEPHONE (OUTPATIENT)
Dept: NEPHROLOGY | Facility: CLINIC | Age: 73
End: 2018-12-17

## 2018-12-17 NOTE — TELEPHONE ENCOUNTER
Called the patient there was no answer , was calling regarding the clearance she says she needed , couldn't leave a voicemail no voicemail set up

## 2018-12-17 NOTE — TELEPHONE ENCOUNTER
----- Message from Rosita Hudson sent at 12/17/2018 12:38 PM CST -----  Contact: self  Patient need to speak with nurse.   Patient states Dr Rios need medical clearance for  surgery patient need clearance fax over [phone].     Please call pt for more info  [phone]

## 2018-12-18 ENCOUNTER — TELEPHONE (OUTPATIENT)
Dept: NEPHROLOGY | Facility: CLINIC | Age: 73
End: 2018-12-18

## 2018-12-19 ENCOUNTER — TELEPHONE (OUTPATIENT)
Dept: RESEARCH | Facility: HOSPITAL | Age: 73
End: 2018-12-19

## 2018-12-19 ENCOUNTER — RESEARCH ENCOUNTER (OUTPATIENT)
Dept: RESEARCH | Facility: HOSPITAL | Age: 73
End: 2018-12-19

## 2018-12-19 NOTE — TELEPHONE ENCOUNTER
----- Message from Atul Vinson MA sent at 12/18/2018  4:56 PM CST -----  Contact: pt      ----- Message -----  From: Holley Emmanuel  Sent: 12/18/2018   4:45 PM  To: Charles Marie Staff    Pt returning your call  About medical release    Ph  [phone]     Thanks

## 2018-12-20 RX ORDER — OXYBUTYNIN CHLORIDE 15 MG/1
15 TABLET, EXTENDED RELEASE ORAL DAILY
Qty: 30 TABLET | Refills: 12 | Status: SHIPPED | OUTPATIENT
Start: 2018-12-20 | End: 2020-04-15

## 2018-12-20 NOTE — PROGRESS NOTES
"Study: Supplement and Effects on Multi-Drug Resistant Urinary Tract Infection  Protocol#: 2017.564  PI: Melany Anderson MD    December 19, 2019 6:35 pm  Subject # [id number]    Subject would like to continue participation in the Supplement and Effects study. She continues to meet all Inclusion criteria and no Exclusion criteria.    Subject submitted a catheterized urine specimen on 12/13/2018 at her Week 12 study visit. It did result positive for E. Coli. Subject is not having any symptoms, so we will not treat at this time. She was advised to call if she started to have any symptoms.  She has asked for an RX for the "cheap" incontinence medication. A message has been sent to Dr. Anderson.    Subject is due back for her Week 18 study visit Jan. 31, 2019.    Cintia BATISTA, BRIDGETTEN  "

## 2018-12-21 ENCOUNTER — TELEPHONE (OUTPATIENT)
Dept: NEPHROLOGY | Facility: CLINIC | Age: 73
End: 2018-12-21

## 2018-12-21 ENCOUNTER — PES CALL (OUTPATIENT)
Dept: ADMINISTRATIVE | Facility: CLINIC | Age: 73
End: 2018-12-21

## 2018-12-21 ENCOUNTER — TELEPHONE (OUTPATIENT)
Dept: RESEARCH | Facility: HOSPITAL | Age: 73
End: 2018-12-21

## 2018-12-22 NOTE — TELEPHONE ENCOUNTER
Pt recently contacted our office requesting clearance for bariatric surgery from kidney standpoint.    Patient has CKD IV. She has higher risk of CRISTEL superimposed on CKD IV juan operatively if especially she develops any large changes in her BP during or after surgery, volume loss and will need close follow up on renal panel after surgery.    Also also she needs to have inpatient nephrology team consulted to follow on her renal function closely during her hospital admission.    avoid NSAID/ bactrim/ IV contrast/ gadolinium/ aminoglycoside where possible    Patient will need weekly renal panel if she is placed on any special diet in preparation for surgery. Please keep our clinic informed if she is to start any special diet prior to surgery and tentative date of surgery.

## 2018-12-24 ENCOUNTER — PATIENT MESSAGE (OUTPATIENT)
Dept: NEPHROLOGY | Facility: CLINIC | Age: 73
End: 2018-12-24

## 2018-12-24 ENCOUNTER — TELEPHONE (OUTPATIENT)
Dept: PSYCHIATRY | Facility: CLINIC | Age: 73
End: 2018-12-24

## 2018-12-24 ENCOUNTER — TELEPHONE (OUTPATIENT)
Dept: NEPHROLOGY | Facility: CLINIC | Age: 73
End: 2018-12-24

## 2018-12-24 NOTE — LETTER
December 24, 2018             [facility]  [address]  [facility] [address]  Phone: [phone]  Fax: [phone] To Whom it May Concern:    This letter is in regards to Medical Clearance for Luisana Chun ([birth year])        Pt recently contacted our office requesting clearance for bariatric surgery from kidney standpoint.     Patient has CKD IV. She has higher risk of CRISTEL superimposed on CKD IV juan operatively if especially she develops any large changes in her BP during or after surgery, volume loss and will need close follow up on renal panel after surgery.     Also, she needs to have inpatient nephrology team consulted to follow on her renal function closely during her hospital admission.     Avoid NSAID/Bactrim/IV contrast/gadolinium/aminoglycoside where possible.     Patient will need weekly renal panel if she is placed on any special diet in preparation for surgery. Please keep our clinic informed if she is to start any special diet prior to surgery and tentative date of surgery.        Sincerely,        Dr. Frank Soto

## 2018-12-24 NOTE — TELEPHONE ENCOUNTER
"Patient called and left voice mail and ask to schedule follow up appointment. She was seen 1 yr ago by Cassandra Rockweiler, LCSW for Bariatric consult. She said she received a letter to schedule follow up. She did not remember who saw her and when I said Cassie Rockweiler, she said, "I don't want to see her, she's a liar."   "

## 2018-12-31 ENCOUNTER — EXTERNAL CHRONIC CARE MANAGEMENT (OUTPATIENT)
Dept: PRIMARY CARE CLINIC | Facility: CLINIC | Age: 73
End: 2018-12-31
Payer: MEDICARE

## 2018-12-31 PROCEDURE — 99490 CHRNC CARE MGMT STAFF 1ST 20: CPT | Mod: S$PBB,,, | Performed by: FAMILY MEDICINE

## 2018-12-31 PROCEDURE — 99490 PR CHRONIC CARE MGMT, 1ST 20 MIN: ICD-10-PCS | Mod: S$PBB,,, | Performed by: FAMILY MEDICINE

## 2018-12-31 PROCEDURE — 99490 CHRNC CARE MGMT STAFF 1ST 20: CPT | Mod: PBBFAC,PO | Performed by: FAMILY MEDICINE

## 2019-01-01 DIAGNOSIS — G47.9 SLEEP DISTURBANCE: ICD-10-CM

## 2019-01-02 RX ORDER — TRAZODONE HYDROCHLORIDE 300 MG/1
TABLET ORAL
Qty: 60 TABLET | Refills: 3 | Status: SHIPPED | OUTPATIENT
Start: 2019-01-02

## 2019-01-28 DIAGNOSIS — Z00.6 RESEARCH STUDY PATIENT: Primary | ICD-10-CM

## 2019-01-29 ENCOUNTER — OFFICE VISIT (OUTPATIENT)
Dept: UROLOGY | Facility: CLINIC | Age: 74
End: 2019-01-29
Payer: MEDICARE

## 2019-01-29 VITALS
HEART RATE: 63 BPM | WEIGHT: 278 LBS | HEIGHT: 69 IN | BODY MASS INDEX: 41.18 KG/M2 | SYSTOLIC BLOOD PRESSURE: 139 MMHG | DIASTOLIC BLOOD PRESSURE: 74 MMHG

## 2019-01-29 DIAGNOSIS — N39.0 RECURRENT UTI: Primary | ICD-10-CM

## 2019-01-29 PROCEDURE — 99999 PR PBB SHADOW E&M-EST. PATIENT-LVL III: ICD-10-PCS | Mod: PBBFAC,,, | Performed by: NURSE PRACTITIONER

## 2019-01-29 PROCEDURE — 99999 PR PBB SHADOW E&M-EST. PATIENT-LVL III: CPT | Mod: PBBFAC,,, | Performed by: NURSE PRACTITIONER

## 2019-01-29 PROCEDURE — 99499 UNLISTED E&M SERVICE: CPT | Mod: S$PBB,,, | Performed by: NURSE PRACTITIONER

## 2019-01-29 PROCEDURE — 99499 NO LOS: ICD-10-PCS | Mod: S$PBB,,, | Performed by: NURSE PRACTITIONER

## 2019-01-29 PROCEDURE — 99213 OFFICE O/P EST LOW 20 MIN: CPT | Mod: PBBFAC | Performed by: NURSE PRACTITIONER

## 2019-01-29 NOTE — PROGRESS NOTES
Study patient.  Week 12.  POCT UA resulted sp grav 1.010, pH 5, negative results  She was seen by Ramone [place].  She is not having symptoms of UTI.  Per Ramone, do not need to proceed with catheterized urine specimen or UA.

## 2019-01-31 ENCOUNTER — LAB VISIT (OUTPATIENT)
Dept: LAB | Facility: HOSPITAL | Age: 74
End: 2019-01-31
Attending: INTERNAL MEDICINE
Payer: MEDICARE

## 2019-01-31 DIAGNOSIS — N18.4 CKD (CHRONIC KIDNEY DISEASE), STAGE IV: ICD-10-CM

## 2019-01-31 LAB
ALBUMIN SERPL BCP-MCNC: 3.7 G/DL
ANION GAP SERPL CALC-SCNC: 9 MMOL/L
BUN SERPL-MCNC: 35 MG/DL
CALCIUM SERPL-MCNC: 8.8 MG/DL
CHLORIDE SERPL-SCNC: 109 MMOL/L
CO2 SERPL-SCNC: 21 MMOL/L
CREAT SERPL-MCNC: 2.1 MG/DL
EST. GFR  (AFRICAN AMERICAN): 26.3 ML/MIN/1.73 M^2
EST. GFR  (NON AFRICAN AMERICAN): 22.8 ML/MIN/1.73 M^2
GLUCOSE SERPL-MCNC: 106 MG/DL
PHOSPHATE SERPL-MCNC: 3.5 MG/DL
POTASSIUM SERPL-SCNC: 4.4 MMOL/L
SODIUM SERPL-SCNC: 139 MMOL/L

## 2019-01-31 PROCEDURE — 80069 RENAL FUNCTION PANEL: CPT

## 2019-01-31 PROCEDURE — 36415 COLL VENOUS BLD VENIPUNCTURE: CPT | Mod: PO

## 2019-02-01 DIAGNOSIS — M81.0 AGE-RELATED OSTEOPOROSIS WITHOUT CURRENT PATHOLOGICAL FRACTURE: Primary | ICD-10-CM

## 2019-02-10 DIAGNOSIS — E55.9 HYPOVITAMINOSIS D: ICD-10-CM

## 2019-02-11 RX ORDER — CALCITRIOL 0.5 UG/1
CAPSULE ORAL
Qty: 90 CAPSULE | Refills: 3 | OUTPATIENT
Start: 2019-02-11

## 2019-02-12 ENCOUNTER — HOSPITAL ENCOUNTER (OUTPATIENT)
Dept: RADIOLOGY | Facility: HOSPITAL | Age: 74
Discharge: HOME | End: 2019-02-12
Attending: INTERNAL MEDICINE
Payer: MEDICARE

## 2019-02-12 DIAGNOSIS — N17.9 AKI (ACUTE KIDNEY INJURY): ICD-10-CM

## 2019-02-12 DIAGNOSIS — M81.0 AGE-RELATED OSTEOPOROSIS WITHOUT CURRENT PATHOLOGICAL FRACTURE: ICD-10-CM

## 2019-02-12 PROCEDURE — 77080 DEXA BONE DENSITY SPINE HIP: ICD-10-PCS | Mod: 26,,, | Performed by: RADIOLOGY

## 2019-02-12 PROCEDURE — 77080 DXA BONE DENSITY AXIAL: CPT | Mod: 26,,, | Performed by: RADIOLOGY

## 2019-02-12 PROCEDURE — 77080 DXA BONE DENSITY AXIAL: CPT | Mod: TC

## 2019-02-13 RX ORDER — CALCITRIOL 0.25 UG/1
0.25 CAPSULE ORAL
Qty: 30 CAPSULE | Refills: 3 | Status: SHIPPED | OUTPATIENT
Start: 2019-02-15

## 2019-02-22 ENCOUNTER — TELEPHONE (OUTPATIENT)
Dept: NEPHROLOGY | Facility: CLINIC | Age: 74
End: 2019-02-22

## 2019-02-22 NOTE — TELEPHONE ENCOUNTER
----- Message from Holley Emmanuel sent at 2/22/2019  9:13 AM CST -----  Contact: [facility]   OUT OF [facility]   [facility]   Ph   [phone] - [facility]     levocetirizune  5mg      Once a day      Faxed 2/11 2/19      Left message on 2/15     PATIENT WAS GETTING FROM ANOTHER DOCTOR  BUT NOW DR VILLEGAS SAID SHE WOULD TAKE OVER     Please call with a status      Thanks

## 2019-02-25 RX ORDER — LEVOCETIRIZINE DIHYDROCHLORIDE 5 MG/1
5 TABLET, FILM COATED ORAL NIGHTLY
Qty: 30 TABLET | Refills: 11 | Status: SHIPPED | OUTPATIENT
Start: 2019-02-25 | End: 2020-02-25

## 2019-02-25 NOTE — TELEPHONE ENCOUNTER
----- Message from Waylon Marr sent at 2/25/2019 12:08 PM CST -----  Contact: [facility] [phone]  REFILL: iván    PHARMACY:[address]

## 2019-03-11 ENCOUNTER — LAB VISIT (OUTPATIENT)
Dept: LAB | Facility: HOSPITAL | Age: 74
End: 2019-03-11
Attending: INTERNAL MEDICINE
Payer: MEDICARE

## 2019-03-11 DIAGNOSIS — N18.4 CKD (CHRONIC KIDNEY DISEASE), STAGE IV: ICD-10-CM

## 2019-03-11 LAB
ALBUMIN SERPL BCP-MCNC: 3.8 G/DL
ANION GAP SERPL CALC-SCNC: 4 MMOL/L
BUN SERPL-MCNC: 23 MG/DL
CALCIUM SERPL-MCNC: 8.5 MG/DL
CHLORIDE SERPL-SCNC: 114 MMOL/L
CO2 SERPL-SCNC: 23 MMOL/L
CREAT SERPL-MCNC: 1.8 MG/DL
EST. GFR  (AFRICAN AMERICAN): 31.7 ML/MIN/1.73 M^2
EST. GFR  (NON AFRICAN AMERICAN): 27.5 ML/MIN/1.73 M^2
GLUCOSE SERPL-MCNC: 94 MG/DL
PHOSPHATE SERPL-MCNC: 2.7 MG/DL
POTASSIUM SERPL-SCNC: 4.7 MMOL/L
SODIUM SERPL-SCNC: 141 MMOL/L

## 2019-03-11 PROCEDURE — 80069 RENAL FUNCTION PANEL: CPT

## 2019-03-11 PROCEDURE — 81003 URINALYSIS AUTO W/O SCOPE: CPT

## 2019-03-11 PROCEDURE — 36415 COLL VENOUS BLD VENIPUNCTURE: CPT | Mod: PO

## 2019-03-12 ENCOUNTER — TELEPHONE (OUTPATIENT)
Dept: RESEARCH | Facility: HOSPITAL | Age: 74
End: 2019-03-12

## 2019-03-12 LAB
BILIRUB UR QL STRIP: NEGATIVE
CLARITY UR REFRACT.AUTO: CLEAR
COLOR UR AUTO: YELLOW
GLUCOSE UR QL STRIP: NEGATIVE
HGB UR QL STRIP: NEGATIVE
KETONES UR QL STRIP: NEGATIVE
LEUKOCYTE ESTERASE UR QL STRIP: NEGATIVE
NITRITE UR QL STRIP: NEGATIVE
PH UR STRIP: 5 [PH] (ref 5–8)
PROT UR QL STRIP: NEGATIVE
SP GR UR STRIP: 1.02 (ref 1–1.03)
URN SPEC COLLECT METH UR: NORMAL

## 2019-03-15 ENCOUNTER — TELEPHONE (OUTPATIENT)
Dept: UROLOGY | Facility: CLINIC | Age: 74
End: 2019-03-15

## 2019-03-15 NOTE — TELEPHONE ENCOUNTER
Called patient back. Was unable to leave a VM because her instructions on the VM stated not to leave a VM for us to call back or text her. I will call her back again.    Antonio      Message from Cecilia El MA sent at 3/14/2019  3:55 PM CDT -----  Contact: self      ----- Message -----  From: Drew Willard  Sent: 3/14/2019   3:18 PM  To: Justin PERRY Staff    Type:  Sooner Appointment Request    Caller is requesting a sooner appointment.  Caller declined first available appointment listed below.  Caller will not accept being placed on the wait list and is requesting a message be sent to doctor.  Name of Caller:Pt  When is the first available appointment? 4/23/19  Symptoms:missed appt  Would the patient rather a call back or a response via [employer]? Call back  Best Call Back Number: [phone]  Additional Information: Pt called in missed appt and wanted to rs. Pt stated that she only has two pills left

## 2019-03-26 NOTE — PROGRESS NOTES
Pt tolerated flu vaccine to right deltoid without difficulty; no adverse reaction noted; VIS given     Attending Attestation (For Attendings USE Only)...

## 2019-03-27 ENCOUNTER — RESEARCH ENCOUNTER (OUTPATIENT)
Dept: RESEARCH | Facility: HOSPITAL | Age: 74
End: 2019-03-27

## 2019-03-27 ENCOUNTER — OFFICE VISIT (OUTPATIENT)
Dept: UROLOGY | Facility: CLINIC | Age: 74
End: 2019-03-27
Payer: MEDICARE

## 2019-03-27 VITALS
HEART RATE: 78 BPM | SYSTOLIC BLOOD PRESSURE: 106 MMHG | WEIGHT: 268.94 LBS | DIASTOLIC BLOOD PRESSURE: 62 MMHG | BODY MASS INDEX: 39.83 KG/M2 | HEIGHT: 69 IN

## 2019-03-27 DIAGNOSIS — R32 URINARY INCONTINENCE, UNSPECIFIED TYPE: ICD-10-CM

## 2019-03-27 DIAGNOSIS — N39.0 RECURRENT UTI: Primary | ICD-10-CM

## 2019-03-27 PROCEDURE — 99213 PR OFFICE/OUTPT VISIT, EST, LEVL III, 20-29 MIN: ICD-10-PCS | Mod: S$PBB,,, | Performed by: PHYSICIAN ASSISTANT

## 2019-03-27 PROCEDURE — 99215 OFFICE O/P EST HI 40 MIN: CPT | Mod: PBBFAC | Performed by: PHYSICIAN ASSISTANT

## 2019-03-27 PROCEDURE — 99999 PR PBB SHADOW E&M-EST. PATIENT-LVL V: ICD-10-PCS | Mod: PBBFAC,,, | Performed by: PHYSICIAN ASSISTANT

## 2019-03-27 PROCEDURE — 99213 OFFICE O/P EST LOW 20 MIN: CPT | Mod: S$PBB,,, | Performed by: PHYSICIAN ASSISTANT

## 2019-03-27 PROCEDURE — 99999 PR PBB SHADOW E&M-EST. PATIENT-LVL V: CPT | Mod: PBBFAC,,, | Performed by: PHYSICIAN ASSISTANT

## 2019-03-27 RX ORDER — UMECLIDINIUM 62.5 UG/1
AEROSOL, POWDER ORAL
COMMUNITY
Start: 2019-02-08

## 2019-03-27 RX ORDER — TIOTROPIUM BROMIDE INHALATION SPRAY 3.12 UG/1
SPRAY, METERED RESPIRATORY (INHALATION)
COMMUNITY
Start: 2019-01-28

## 2019-03-27 RX ORDER — OXYBUTYNIN CHLORIDE 15 MG/1
TABLET, EXTENDED RELEASE ORAL
COMMUNITY
Start: 2019-02-10 | End: 2019-12-16 | Stop reason: SINTOL

## 2019-03-27 RX ORDER — LOSARTAN POTASSIUM 50 MG/1
TABLET ORAL
COMMUNITY

## 2019-03-27 NOTE — PROGRESS NOTES
Supplement and Effects Probiotic Study  Dr. Melany Anderson  IRB# 2017.564      Subject # 012    Date of Visit: 27 Mar 2019    Week 24 Visit    Once determining subject would like to continue participation in this study, The following procedures/tasks were completed:    1. Subject does still meet all Inclusion criteria and no Exclusion criteria.  2. Subject's vital signs were recorded.  3. Urine POCT test was completed. Test was negative.  4. Urine pregnancy test was not necessary.   5. An assessment for any AEs/SAEs was performed. Subject has not reported any AEs/SAEs.   6. Concomitant medications were reviewed with the subject. There are no changes.  7. A review of supplement compliance was completed. The subject will call back with pill count.  8. A review of any antibiotics used since the last visit was completed. Subject reports no antibiotic use.  9. An assessment of subject's stool was completed using the [facility] Stool Chart. Subject reports Type 4.  10. Subject has completed the Visit Questionnaire without help.  11. Study drug was dispensed to the subject. Subject did not have any questions.  12. Week 38 visit scheduled on 26 Jun 2019.

## 2019-03-27 NOTE — PROGRESS NOTES
CHIEF COMPLAINT:    Mrs. Chun is a 73 y.o. female presenting for recurrent UTI follow up.  PRESENTING ILLNESS:    Luisana Chun is a 73 y.o. female with a PMH of recurrent UTI who presents for recurrent UTI follow up.  She is a research patient.    Patient is fine today.  She does not have any urinary complaints.  She denies dysuria and frequency.  She reports urinary incontinence which is her norm.  She wears 4-5 pads a day. She is currently taking myrbetriq 25mg but does not feel like it is working well enough.  Sometimes she is unaware that she is experiencing incontinence.  She reports that Dr. Anderson recommended a bladder sling when she had her urodynamic study.     FUDS/cysto 10/1/18   Analysis:  1. Decreased bladder capacity  2. Normal bladder compliance  3. Increased sensation  4. No leak noted  5. Some increased sphincter activity with voiding, although appears to be voluntary  6. Mostly complete emptying  Normal cysto    Recommendations:   They will follow up according to the trial.   Patient was treated by ID for ertapenem.   Patient is not on oxybutnin XR 15mg currently b/c of side effects of dry mouth.   Trial of myrbetriq 25mg, dose titrate to 50mg.   Patient to fill script for vaginal estrogen.    REVIEW OF SYSTEMS:    Constitutional: Negative for fever and chills.   HENT: Positive for hearing loss.   Eyes: Negative for visual disturbance.   Respiratory: Negative for shortness of breath.   Cardiovascular: Negative for chest pain.   Gastrointestinal: Negative for vomiting, and constipation.   Genitourinary:  See HPI  Neurological: Positive for dizziness. (followed by pcp)  Hematological: Does not bruise/bleed easily.   Psychiatric/Behavioral: Positive for confusion. (followed by pcp)    PATIENT HISTORY:    Past Medical History:   Diagnosis Date    Hypertension 1968    Kidney disease, chronic, stage III (GFR 30-59 ml/min) 2010    Obesity     Osteoporosis 2016    Thyroid cancer 2017     Thyroid disease 2013       Past Surgical History:   Procedure Laterality Date    APPENDECTOMY  1985    BACK SURGERY  february 2016    [id number] N/A 6/10/2016    Performed by [facility] OR 2ND FLR    COLONOSCOPY      DILATION AND CURETTAGE OF UTERUS  1996    ESOPHAGOGASTRODUODENOSCOPY      FACIAL COSMETIC SURGERY  1980, 2005    KNEE SURGERY Bilateral 1995, 2010    left toe Left 1995    SKIN SURGERY Right 05/2018    Right Shoulder    TONSILLECTOMY  1955    tumor biopsy thyroid      URODYNAMIC STUDY, FLUOROSCOPIC N/A 10/1/2018    Performed by Melany Anderson MD at [facility] OR 1ST FLR       Family History   Problem Relation Age of Onset    Hypertension Mother     Heart disease Mother     Macular degeneration Mother     Hypertension Father     Cataracts Father     Glaucoma Father     Macular degeneration Father     Glaucoma Brother     Macular degeneration Brother     No Known Problems Sister     No Known Problems Maternal Aunt     No Known Problems Maternal Uncle     No Known Problems Paternal Aunt     No Known Problems Paternal Uncle     Macular degeneration Maternal Grandmother     Macular degeneration Maternal Grandfather     Macular degeneration Paternal Grandmother     Glaucoma Paternal Grandfather     Macular degeneration Paternal Grandfather     Amblyopia Neg Hx     Blindness Neg Hx     Cancer Neg Hx     Diabetes Neg Hx     Retinal detachment Neg Hx     Strabismus Neg Hx     Stroke Neg Hx     Thyroid disease Neg Hx        Social History     Socioeconomic History    Marital status:      Spouse name: Not on file    Number of children: Not on file    Years of education: Not on file    Highest education level: Not on file   Occupational History    Not on file   Social Needs    Financial resource strain: Not on file    Food insecurity:     Worry: Not on file     Inability: Not on file    Transportation needs:     Medical: Not on  file     Non-medical: Not on file   Tobacco Use    Smoking status: Former Smoker    Smokeless tobacco: Never Used   Substance and Sexual Activity    Alcohol use: Yes     Alcohol/week: 0.0 oz     Comment: social    Drug use: No    Sexual activity: Not Currently     Partners: Male     Comment: 5/4/17 single   Lifestyle    Physical activity:     Days per week: Not on file     Minutes per session: Not on file    Stress: Not on file   Relationships    Social connections:     Talks on phone: Not on file     Gets together: Not on file     Attends Mosque service: Not on file     Active member of club or organization: Not on file     Attends meetings of clubs or organizations: Not on file     Relationship status: Not on file    Intimate partner violence:     Fear of current or ex partner: Not on file     Emotionally abused: Not on file     Physically abused: Not on file     Forced sexual activity: Not on file   Other Topics Concern    Not on file   Social History Narrative    Not on file       Allergies:  Patient has no known allergies.    Medications:    Current Outpatient Medications:     azelastine (ASTELIN) 137 mcg (0.1 %) nasal spray, 1 spray (137 mcg total) by Nasal route 2 (two) times daily., Disp: 30 mL, Rfl: 0    calcitRIOL (ROCALTROL) 0.25 MCG Cap, Take 1 capsule (0.25 mcg total) by mouth every Monday and Friday., Disp: 30 capsule, Rfl: 3    conjugated estrogens (PREMARIN) vaginal cream, Pea size amount to urethral area every 2 weeks, then 3x / week, Disp: 45 g, Rfl: 6    docosahexanoic acid/epa (FISH OIL ORAL), Take by mouth once daily., Disp: , Rfl:     ergocalciferol (ERGOCALCIFEROL) 50,000 unit Cap, Take 1 capsule (50,000 Units total) by mouth every 7 days., Disp: 12 capsule, Rfl: 1    FERROUS SULFATE ORAL, Take 28 mg by mouth once daily., Disp: , Rfl:     fluconazole (DIFLUCAN) 100 MG tablet, , Disp: , Rfl:     FLUoxetine (PROZAC) 20 MG capsule, , Disp: , Rfl:     fluticasone (FLONASE)  50 mcg/actuation nasal spray, 1 spray (50 mcg total) by Each Nare route 2 (two) times daily., Disp: 1 Bottle, Rfl: 11    hydroCHLOROthiazide (HYDRODIURIL) 25 MG tablet, Take 25 mg by mouth once daily., Disp: , Rfl:     INCRUSE ELLIPTA 62.5 mcg/actuation DsDv, , Disp: , Rfl:     irbesartan (AVAPRO) 300 MG tablet, TAKE ONE TABLET BY MOUTH IN THE EVENING, Disp: 90 tablet, Rfl: 3    levocetirizine (XYZAL) 5 MG tablet, Take 1 tablet (5 mg total) by mouth every evening., Disp: 30 tablet, Rfl: 11    liothyronine (CYTOMEL) 5 MCG Tab, Take 5 mcg by mouth once daily., Disp: , Rfl:     losartan (COZAAR) 50 MG tablet, losartan 50 mg tablet  Take 1 tablet every day by oral route., Disp: , Rfl:     metoprolol succinate (TOPROL-XL) 50 MG 24 hr tablet, Take 1 tablet (50 mg total) by mouth once daily. For high blood pressure, Disp: 30 tablet, Rfl: 11    mirabegron (MYRBETRIQ) 25 mg Tb24 ER tablet, Take 1 tablet (25 mg total) by mouth once daily., Disp: 30 tablet, Rfl: 11    oxybutynin (DITROPAN XL) 15 MG TR24, , Disp: , Rfl:     PHENYLEPH/MINERAL OIL/PETROLAT (PREPARATION H RECT), Place rectally once daily., Disp: , Rfl:     sodium bicarbonate 325 MG tablet, Take 1 tablet (325 mg total) by mouth 4 (four) times daily., Disp: 120 tablet, Rfl: 6    SPIRIVA RESPIMAT 2.5 mcg/actuation Mist, , Disp: , Rfl:     SYNTHROID 200 mcg tablet, , Disp: , Rfl:     traZODone (DESYREL) 300 MG tablet, TAKE 2 TABLETS BY MOUTH IN THE EVENING, Disp: 60 tablet, Rfl: 3    NIFEdipine (PROCARDIA-XL) 30 MG (OSM) 24 hr tablet, Take 1 tablet (30 mg total) by mouth once daily., Disp: 30 tablet, Rfl: 11    triamcinolone acetonide 0.1% (KENALOG) 0.1 % cream, , Disp: , Rfl:     triamcinolone acetonide 0.1% (KENALOG) 0.1 % cream, APPLY TOPICALLY 2 TIMES DAILY, Disp: 45 g, Rfl: 1    zinc (CHELATED ZINC) 50 mg Tab, Take by mouth once daily., Disp: , Rfl:     PHYSICAL EXAMINATION:    Constitutional: She appears well-developed and well-nourished.  She  is in no apparent distress.    Eyes: No scleral icterus noted bilaterally. No discharge bilaterally.    Nose: No rhinorrhea    Cardiovascular: Normal rate.  Pitting edema noted in lower extremities bilaterally    Pulmonary/Chest: Effort normal. No respiratory distress.     Abdominal:  She exhibits no distension.  There is no CVA tenderness.     Lymphadenopathy:          Right: No supraclavicular adenopathy present.        Left: No supraclavicular adenopathy present.     Neurological: She is alert and oriented to person, place, and time.     Skin: Skin is warm and dry.     Psych: Cooperative with normal affect.    Physical Exam      LABS:    U/a: 1.015, pH 5, tr protein, otherwise negative.     IMPRESSION:    Encounter Diagnoses   Name Primary?    Recurrent UTI Yes    Urinary incontinence, unspecified type        PLAN:  Patient seen by [facility] [employer] coordinator  Discussed increasing myrbetriq. Patient declined stating that she wants to have the bladder sling surgery as recommended. I reviewed recommendations from urodynamic reports and did not see bladder sling recommendation.  Patient requested to be evaluated for bladder sling so appointment with Dr. Collado was made.          Monalisa Rolle PA-C

## 2019-04-05 ENCOUNTER — TELEPHONE (OUTPATIENT)
Dept: NEPHROLOGY | Facility: CLINIC | Age: 74
End: 2019-04-05

## 2019-04-05 NOTE — TELEPHONE ENCOUNTER
Per dr amado wants to see pt back in may . Pt stated she wants an explanation on why she had to wait 9months to a year to be seen. She will not see dr amado until she get an explanation

## 2019-04-09 ENCOUNTER — TELEPHONE (OUTPATIENT)
Dept: NEPHROLOGY | Facility: CLINIC | Age: 74
End: 2019-04-09

## 2019-04-09 NOTE — TELEPHONE ENCOUNTER
Called pt to inform pt on the feedback per dr soto pt stated that if nothing wrong with her labs then she dont need a f/u unless otherwise she'll called us.    ----- Message from Frank Soto MD sent at 4/8/2019  5:08 PM CDT -----  Clinic access issues. If she would like to see another nephrologist then by all means ok with me. Please clarify with her. I have been trying my level best to work on the long wait list.    There are several appointments available in may if she can find something that can work with her schedule.     ----- Message -----  From: Celestina Mendes MA  Sent: 4/5/2019   1:31 PM  To: Frank Soto MD    Pt stated she wants an explanation why she had to wait 9months to a year to be seen when you agreed to see her every six months. I explain to the pt about the limited clinic days you have and [facility]. She didn't want hear what I had to say . She wants you to explain why she not being seening every 6months

## 2019-06-11 ENCOUNTER — TELEPHONE (OUTPATIENT)
Dept: FAMILY MEDICINE | Facility: CLINIC | Age: 74
End: 2019-06-11

## 2019-06-11 NOTE — TELEPHONE ENCOUNTER
----- Message from Anni Solitario sent at 6/11/2019  1:12 PM CDT -----  Contact: [employer]  Type: Patient Call Back    Who called:[employer]    What is the request in detail: [employer] is calling to check the status of oxygen recertification.     Can the clinic reply by [facility]? No    Would the patient rather a call back or a response via [facility]? Call back    Best call back number: [phone]    Additional Information:

## 2019-06-20 RX ORDER — ERGOCALCIFEROL 1.25 MG/1
CAPSULE ORAL
Qty: 12 CAPSULE | Refills: 1 | OUTPATIENT
Start: 2019-06-20

## 2019-06-26 ENCOUNTER — OFFICE VISIT (OUTPATIENT)
Dept: UROLOGY | Facility: CLINIC | Age: 74
End: 2019-06-26
Payer: MEDICARE

## 2019-06-26 VITALS
HEART RATE: 67 BPM | BODY MASS INDEX: 36.11 KG/M2 | SYSTOLIC BLOOD PRESSURE: 110 MMHG | HEIGHT: 69 IN | WEIGHT: 243.81 LBS | DIASTOLIC BLOOD PRESSURE: 69 MMHG

## 2019-06-26 DIAGNOSIS — N39.0 RECURRENT UTI: Primary | ICD-10-CM

## 2019-06-26 LAB
BILIRUB UR QL STRIP: NEGATIVE
CLARITY UR REFRACT.AUTO: ABNORMAL
COLOR UR AUTO: YELLOW
GLUCOSE UR QL STRIP: NEGATIVE
HGB UR QL STRIP: NEGATIVE
KETONES UR QL STRIP: NEGATIVE
LEUKOCYTE ESTERASE UR QL STRIP: NEGATIVE
NITRITE UR QL STRIP: NEGATIVE
PH UR STRIP: 5 [PH] (ref 5–8)
PROT UR QL STRIP: NEGATIVE
SP GR UR STRIP: 1.01 (ref 1–1.03)
URN SPEC COLLECT METH UR: ABNORMAL

## 2019-06-26 PROCEDURE — 51701 INSERT BLADDER CATHETER: CPT | Mod: S$PBB,,, | Performed by: PHYSICIAN ASSISTANT

## 2019-06-26 PROCEDURE — 51701 INSERT BLADDER CATHETER: CPT | Mod: PBBFAC | Performed by: PHYSICIAN ASSISTANT

## 2019-06-26 PROCEDURE — 99214 OFFICE O/P EST MOD 30 MIN: CPT | Mod: PBBFAC | Performed by: PHYSICIAN ASSISTANT

## 2019-06-26 PROCEDURE — 51701 PR INSERTION OF NON-INDWELLING BLADDER CATHETERIZATION FOR RESIDUAL UR: ICD-10-PCS | Mod: S$PBB,,, | Performed by: PHYSICIAN ASSISTANT

## 2019-06-26 PROCEDURE — 87186 SC STD MICRODIL/AGAR DIL: CPT

## 2019-06-26 PROCEDURE — 87077 CULTURE AEROBIC IDENTIFY: CPT

## 2019-06-26 PROCEDURE — 87088 URINE BACTERIA CULTURE: CPT

## 2019-06-26 PROCEDURE — 99999 PR PBB SHADOW E&M-EST. PATIENT-LVL IV: ICD-10-PCS | Mod: PBBFAC,,, | Performed by: PHYSICIAN ASSISTANT

## 2019-06-26 PROCEDURE — 87086 URINE CULTURE/COLONY COUNT: CPT

## 2019-06-26 PROCEDURE — 99999 PR PBB SHADOW E&M-EST. PATIENT-LVL IV: CPT | Mod: PBBFAC,,, | Performed by: PHYSICIAN ASSISTANT

## 2019-06-26 PROCEDURE — 99213 PR OFFICE/OUTPT VISIT, EST, LEVL III, 20-29 MIN: ICD-10-PCS | Mod: S$PBB,25,, | Performed by: PHYSICIAN ASSISTANT

## 2019-06-26 PROCEDURE — 99213 OFFICE O/P EST LOW 20 MIN: CPT | Mod: S$PBB,25,, | Performed by: PHYSICIAN ASSISTANT

## 2019-06-26 PROCEDURE — 81003 URINALYSIS AUTO W/O SCOPE: CPT

## 2019-06-26 NOTE — PROGRESS NOTES
CHIEF COMPLAINT:    Mrs. Chun is a 73 y.o. female presenting for recurrent UTI follow up.  PRESENTING ILLNESS:    Luisana Chun is a 73 y.o. female with a PMH of recurrent UTI who presents for recurrent UTI follow up.       She is a research patient.    Patient is fine today.  She does not have any urinary complaints.  She denies dysuria, frequency, urgency, urge incontinence.  She reports stress incontinence with a bad sneeze.  She is not wearing pads anymore.  She reports having a leakage problem before but it has cleared up.    She is no longer taking myrbetriq 25mg.   She reports that Dr. Anderson recommended a bladder sling when she had her urodynamic study.      FUDS/cysto 10/1/18   Analysis:  1. Decreased bladder capacity  2. Normal bladder compliance  3. Increased sensation  4. No leak noted  5. Some increased sphincter activity with voiding, although appears to be voluntary  6. Mostly complete emptying  Normal cysto    Recommendations:   They will follow up according to the trial.   Patient was treated by ID for ertapenem.   Patient is not on oxybutnin XR 15mg currently b/c of side effects of dry mouth.   Trial of myrbetriq 25mg, dose titrate to 50mg.   Patient to fill script for vaginal estrogen.       REVIEW OF SYSTEMS:  Constitutional: Negative for fever and chills.   HENT: Positive for hearing loss. (tinnitus)  Eyes: Negative for visual disturbance.   Respiratory: Negative for shortness of breath.   Cardiovascular: Negative for chest pain.   Gastrointestinal: Negative for vomiting, and constipation.   Genitourinary:  See HPI  Neurological: Negative for dizziness.   Hematological: Does not bruise/bleed easily.   Psychiatric/Behavioral: Negative for confusion.     PATIENT HISTORY:    Past Medical History:   Diagnosis Date    Hypertension 1968    Kidney disease, chronic, stage III (GFR 30-59 ml/min) 2010    Obesity     Osteoporosis 2016    Thyroid cancer 2017    Thyroid disease 2013        Past Surgical History:   Procedure Laterality Date    APPENDECTOMY  1985    BACK SURGERY  february 2016    [id number] N/A 6/10/2016    Performed by [facility] at [facility] OR 2ND FLR    COLONOSCOPY      DILATION AND CURETTAGE OF UTERUS  1996    ESOPHAGOGASTRODUODENOSCOPY      FACIAL COSMETIC SURGERY  1980, 2005    KNEE SURGERY Bilateral 1995, 2010    left toe Left 1995    SKIN SURGERY Right 05/2018    Right Shoulder    TONSILLECTOMY  1955    tumor biopsy thyroid      URODYNAMIC STUDY, FLUOROSCOPIC N/A 10/1/2018    Performed by Melany Anderson MD at [facility] OR 1ST FLR       Family History   Problem Relation Age of Onset    Hypertension Mother     Heart disease Mother     Macular degeneration Mother     Hypertension Father     Cataracts Father     Glaucoma Father     Macular degeneration Father     Glaucoma Brother     Macular degeneration Brother     No Known Problems Sister     No Known Problems Maternal Aunt     No Known Problems Maternal Uncle     No Known Problems Paternal Aunt     No Known Problems Paternal Uncle     Macular degeneration Maternal Grandmother     Macular degeneration Maternal Grandfather     Macular degeneration Paternal Grandmother     Glaucoma Paternal Grandfather     Macular degeneration Paternal Grandfather     Amblyopia Neg Hx     Blindness Neg Hx     Cancer Neg Hx     Diabetes Neg Hx     Retinal detachment Neg Hx     Strabismus Neg Hx     Stroke Neg Hx     Thyroid disease Neg Hx        Social History     Socioeconomic History    Marital status:      Spouse name: Not on file    Number of children: Not on file    Years of education: Not on file    Highest education level: Not on file   Occupational History    Not on file   Social Needs    Financial resource strain: Not on file    Food insecurity:     Worry: Not on file     Inability: Not on file    Transportation needs:     Medical: Not on file     Non-medical:  Not on file   Tobacco Use    Smoking status: Former Smoker    Smokeless tobacco: Never Used   Substance and Sexual Activity    Alcohol use: Yes     Alcohol/week: 0.0 oz     Comment: social    Drug use: No    Sexual activity: Not Currently     Partners: Male     Comment: 5/4/17 single   Lifestyle    Physical activity:     Days per week: Not on file     Minutes per session: Not on file    Stress: Not on file   Relationships    Social connections:     Talks on phone: Not on file     Gets together: Not on file     Attends Buddhist service: Not on file     Active member of club or organization: Not on file     Attends meetings of clubs or organizations: Not on file     Relationship status: Not on file   Other Topics Concern    Not on file   Social History Narrative    Not on file       Allergies:  Patient has no known allergies.    Medications:    Current Outpatient Medications:     azelastine (ASTELIN) 137 mcg (0.1 %) nasal spray, 1 spray (137 mcg total) by Nasal route 2 (two) times daily., Disp: 30 mL, Rfl: 0    calcitRIOL (ROCALTROL) 0.25 MCG Cap, Take 1 capsule (0.25 mcg total) by mouth every Monday and Friday., Disp: 30 capsule, Rfl: 3    conjugated estrogens (PREMARIN) vaginal cream, Pea size amount to urethral area every 2 weeks, then 3x / week, Disp: 45 g, Rfl: 6    docosahexanoic acid/epa (FISH OIL ORAL), Take by mouth once daily., Disp: , Rfl:     ergocalciferol (ERGOCALCIFEROL) 50,000 unit Cap, Take 1 capsule (50,000 Units total) by mouth every 7 days., Disp: 12 capsule, Rfl: 1    FERROUS SULFATE ORAL, Take 28 mg by mouth once daily., Disp: , Rfl:     fluconazole (DIFLUCAN) 100 MG tablet, , Disp: , Rfl:     FLUoxetine (PROZAC) 20 MG capsule, , Disp: , Rfl:     fluticasone (FLONASE) 50 mcg/actuation nasal spray, 1 spray (50 mcg total) by Each Nare route 2 (two) times daily., Disp: 1 Bottle, Rfl: 11    hydroCHLOROthiazide (HYDRODIURIL) 25 MG tablet, Take 25 mg by mouth once daily., Disp: ,  Rfl:     INCRUSE ELLIPTA 62.5 mcg/actuation DsDv, , Disp: , Rfl:     irbesartan (AVAPRO) 300 MG tablet, TAKE ONE TABLET BY MOUTH IN THE EVENING, Disp: 90 tablet, Rfl: 3    levocetirizine (XYZAL) 5 MG tablet, Take 1 tablet (5 mg total) by mouth every evening., Disp: 30 tablet, Rfl: 11    liothyronine (CYTOMEL) 5 MCG Tab, Take 5 mcg by mouth once daily., Disp: , Rfl:     losartan (COZAAR) 50 MG tablet, losartan 50 mg tablet  Take 1 tablet every day by oral route., Disp: , Rfl:     metoprolol succinate (TOPROL-XL) 50 MG 24 hr tablet, Take 1 tablet (50 mg total) by mouth once daily. For high blood pressure, Disp: 30 tablet, Rfl: 11    mirabegron (MYRBETRIQ) 25 mg Tb24 ER tablet, Take 1 tablet (25 mg total) by mouth once daily., Disp: 30 tablet, Rfl: 11    NIFEdipine (PROCARDIA-XL) 30 MG (OSM) 24 hr tablet, Take 1 tablet (30 mg total) by mouth once daily., Disp: 30 tablet, Rfl: 11    oxybutynin (DITROPAN XL) 15 MG TR24, , Disp: , Rfl:     PHENYLEPH/MINERAL OIL/PETROLAT (PREPARATION H RECT), Place rectally once daily., Disp: , Rfl:     sodium bicarbonate 325 MG tablet, Take 1 tablet (325 mg total) by mouth 4 (four) times daily., Disp: 120 tablet, Rfl: 6    SPIRIVA RESPIMAT 2.5 mcg/actuation Mist, , Disp: , Rfl:     SYNTHROID 200 mcg tablet, , Disp: , Rfl:     traZODone (DESYREL) 300 MG tablet, TAKE 2 TABLETS BY MOUTH IN THE EVENING, Disp: 60 tablet, Rfl: 3    triamcinolone acetonide 0.1% (KENALOG) 0.1 % cream, , Disp: , Rfl:     triamcinolone acetonide 0.1% (KENALOG) 0.1 % cream, APPLY TOPICALLY 2 TIMES DAILY, Disp: 45 g, Rfl: 1    zinc (CHELATED ZINC) 50 mg Tab, Take by mouth once daily., Disp: , Rfl:     PHYSICAL EXAMINATION:    Constitutional: She appears well-developed and well-nourished.  She is in no apparent distress.    Eyes: No scleral icterus noted bilaterally. No discharge bilaterally.    Nose: No rhinorrhea    Cardiovascular: Normal rate. Slight pitting edema noted in lower extremities  bilaterally    Pulmonary/Chest: Effort normal. No respiratory distress.     Abdominal:  She exhibits no distension.  There is no CVA tenderness.     Lymphadenopathy:          Right: No supraclavicular adenopathy present.        Left: No supraclavicular adenopathy present.     Neurological: She is alert and oriented to person, place, and time.     Skin: Skin is warm and dry.     Psych: Cooperative with normal affect.    Genitourinary:  Normal external female genitalia  Urethral meatus is normal  Consent verbally obtained.  Betadine prep was applied to the urethral meatus. An in and out cath was performed after voiding.  The PVR was 50 ml.      Physical Exam      LABS:    U/a: 1.010, pH 5, + nitrite otherwise negative.     IMPRESSION:    Encounter Diagnoses   Name Primary?    Recurrent UTI Yes       PLAN:    Urinalysis  Urine culture.  Patient is asymptomatic.  I will not treat unless patient becomes symptomatic.    She will be contacted by the research coordinator, Cintia in regards to research and next follow up visit.      Monalisa Rolle PA-C

## 2019-06-28 LAB — BACTERIA UR CULT: ABNORMAL

## 2019-07-01 DIAGNOSIS — I87.2 VENOUS INSUFFICIENCY: Primary | ICD-10-CM

## 2019-07-22 ENCOUNTER — LAB VISIT (OUTPATIENT)
Dept: LAB | Facility: HOSPITAL | Age: 74
End: 2019-07-22
Attending: NURSE PRACTITIONER
Payer: MEDICARE

## 2019-07-22 DIAGNOSIS — Z85.850 HX OF PAPILLARY THYROID CARCINOMA: Primary | ICD-10-CM

## 2019-07-22 PROCEDURE — 36415 COLL VENOUS BLD VENIPUNCTURE: CPT | Mod: PO

## 2019-07-24 LAB
THRYOGLOBULIN INTERPRETATION: NORMAL
THYROGLOB AB SERPL-ACNC: <1.8 IU/ML
THYROGLOB SERPL-MCNC: <0.1 NG/ML

## 2019-08-01 ENCOUNTER — OFFICE VISIT (OUTPATIENT)
Dept: VASCULAR SURGERY | Facility: CLINIC | Age: 74
End: 2019-08-01
Payer: MEDICARE

## 2019-08-01 ENCOUNTER — HOSPITAL ENCOUNTER (OUTPATIENT)
Dept: CARDIOLOGY | Facility: HOSPITAL | Age: 74
Discharge: HOME | End: 2019-08-01
Attending: SURGERY
Payer: MEDICARE

## 2019-08-01 VITALS
HEART RATE: 64 BPM | SYSTOLIC BLOOD PRESSURE: 118 MMHG | HEIGHT: 69 IN | BODY MASS INDEX: 31.85 KG/M2 | DIASTOLIC BLOOD PRESSURE: 66 MMHG | WEIGHT: 215.06 LBS

## 2019-08-01 DIAGNOSIS — I89.0 LYMPHEDEMA OF BOTH LOWER EXTREMITIES: ICD-10-CM

## 2019-08-01 DIAGNOSIS — I89.0 LYMPHEDEMA OF BOTH LOWER EXTREMITIES: Primary | ICD-10-CM

## 2019-08-01 DIAGNOSIS — I87.2 VENOUS INSUFFICIENCY: ICD-10-CM

## 2019-08-01 DIAGNOSIS — I87.2 VENOUS INSUFFICIENCY OF BOTH LOWER EXTREMITIES: Primary | ICD-10-CM

## 2019-08-01 PROCEDURE — 99204 PR OFFICE/OUTPT VISIT, NEW, LEVL IV, 45-59 MIN: ICD-10-PCS | Mod: S$PBB,,, | Performed by: SURGERY

## 2019-08-01 PROCEDURE — 99999 PR PBB SHADOW E&M-EST. PATIENT-LVL IV: ICD-10-PCS | Mod: PBBFAC,,, | Performed by: SURGERY

## 2019-08-01 PROCEDURE — 93970 EXTREMITY STUDY: CPT | Mod: 50,TC

## 2019-08-01 PROCEDURE — 99999 PR PBB SHADOW E&M-EST. PATIENT-LVL IV: CPT | Mod: PBBFAC,,, | Performed by: SURGERY

## 2019-08-01 PROCEDURE — 99214 OFFICE O/P EST MOD 30 MIN: CPT | Mod: PBBFAC,25 | Performed by: SURGERY

## 2019-08-01 PROCEDURE — 93970 CV US LOWER VENOUS INSUFFICIENCY BILATERAL (CUPID ONLY): ICD-10-PCS | Mod: 26,,, | Performed by: SURGERY

## 2019-08-01 PROCEDURE — 93970 EXTREMITY STUDY: CPT | Mod: 26,,, | Performed by: SURGERY

## 2019-08-01 PROCEDURE — 99204 OFFICE O/P NEW MOD 45 MIN: CPT | Mod: S$PBB,,, | Performed by: SURGERY

## 2019-08-01 RX ORDER — ASPIRIN 325 MG
TABLET, DELAYED RELEASE (ENTERIC COATED) ORAL
Refills: 1 | COMMUNITY
Start: 2019-07-10

## 2019-08-01 NOTE — LETTER
August 1, 2019      Sung Velasquez MD  [address] [place] [address]           [facility]  [address] [facility] [address]  [place] [address]  Phone: [phone]  Fax: [phone]          Patient: Luisana Chun   MR Number: [id number]   YOB: 1945   Date of Visit: 8/1/2019       Dear Dr. Sung Velasquez:    Thank you for referring Luisana Chun to me for evaluation. Attached you will find relevant portions of my assessment and plan of care.    If you have questions, please do not hesitate to call me. I look forward to following Luisana Chun along with you.    Sincerely,    Wilfredo Medina MD    Enclosure  CC:  No Recipients    If you would like to receive this communication electronically, please contact [email or web address] or [phone] to request more information on [facility] access.    For providers and/or their staff who would like to refer a patient to [facility], please contact us through our one-stop-shop provider referral line, [facility], at [phone].    If you feel you have received this communication in error or would no longer like to receive these types of communications, please e-mail [email or web address]

## 2019-08-01 NOTE — PROGRESS NOTES
Wilfredo Medina MD, RPVI                                 [facility]                                                        08/01/2019    HPI:  Luisana Chun is a 73 y.o. female with   Patient Active Problem List   Diagnosis    Osteoporosis without pathological fracture    Menopausal state    Secondary hyperparathyroidism    Essential hypertension    Obesity, Class II, BMI 35-39.9    Vitamin D deficiency    Normocytic anemia    Rosacea    Bilateral chronic knee pain    Sleep disturbance    KING (obstructive sleep apnea)    CKD (chronic kidney disease), stage IV    Hypertensive CKD (chronic kidney disease)    Primary hyperparathyroidism    Pedal edema    Morbid obesity due to excess calories    Mild episode of recurrent major depressive disorder    Primary thyroid cancer    Chronic knee pain after total replacement of both knee joints    Gait instability    Chronic pain of right ankle    Restrictive pattern present on pulmonary function testing    Gastro-esophageal reflux    Metabolic acidosis    Mixed stress and urge urinary incontinence    Urinary urgency    Nocturia    CRISTEL (acute kidney injury)    Bladder disorder    being managed by PCP and specialists who is here today for evaluation of BLE edema.  Patient states location is BLE occurring for 2 yrs.  Associated signs and symptoms include pain.  Quality is tight/itching and severity is 5/10.  Symptoms began 2 yrs ago.  Alleviating factors include diuretics.  Worsening factors include dependency.  Patient is wearing compression stockings daily.  No FH of venous disease.  Symptoms do limit patient's functional status and daily activities.  No DVT history.  No venous interventions.  + low sodium diet.  No excessive water intake.    Migraine with aura: No  PFO/ASD/right to left shunt: No  Pregnant: no  Breastfeeding: no    no MI  no Stroke  Tobacco use: no    Past Medical History:   Diagnosis Date     Hypertension 1968    Kidney disease, chronic, stage III (GFR 30-59 ml/min) 2010    Obesity     Osteoporosis 2016    Thyroid cancer 2017    Thyroid disease 2013     Past Surgical History:   Procedure Laterality Date    APPENDECTOMY  1985    BACK SURGERY  february 2016    [id number]-MA N/A 6/10/2016    Performed by [facility] at [facility] OR 2ND FLR    COLONOSCOPY      DILATION AND CURETTAGE OF UTERUS  1996    ESOPHAGOGASTRODUODENOSCOPY      FACIAL COSMETIC SURGERY  1980, 2005    KNEE SURGERY Bilateral 1995, 2010    left toe Left 1995    SKIN SURGERY Right 05/2018    Right Shoulder    TONSILLECTOMY  1955    tumor biopsy thyroid      URODYNAMIC STUDY, FLUOROSCOPIC N/A 10/1/2018    Performed by Melany Anderson MD at [facility] OR 1ST FLR     Family History   Problem Relation Age of Onset    Hypertension Mother     Heart disease Mother     Macular degeneration Mother     Hypertension Father     Cataracts Father     Glaucoma Father     Macular degeneration Father     Glaucoma Brother     Macular degeneration Brother     No Known Problems Sister     No Known Problems Maternal Aunt     No Known Problems Maternal Uncle     No Known Problems Paternal Aunt     No Known Problems Paternal Uncle     Macular degeneration Maternal Grandmother     Macular degeneration Maternal Grandfather     Macular degeneration Paternal Grandmother     Glaucoma Paternal Grandfather     Macular degeneration Paternal Grandfather     Amblyopia Neg Hx     Blindness Neg Hx     Cancer Neg Hx     Diabetes Neg Hx     Retinal detachment Neg Hx     Strabismus Neg Hx     Stroke Neg Hx     Thyroid disease Neg Hx      Social History     Socioeconomic History    Marital status:      Spouse name: Not on file    Number of children: Not on file    Years of education: Not on file    Highest education level: Not on file   Occupational History    Not on file   Social Needs    Financial resource  strain: Not on file    Food insecurity:     Worry: Not on file     Inability: Not on file    Transportation needs:     Medical: Not on file     Non-medical: Not on file   Tobacco Use    Smoking status: Former Smoker    Smokeless tobacco: Never Used   Substance and Sexual Activity    Alcohol use: Yes     Alcohol/week: 0.0 oz     Comment: social    Drug use: No    Sexual activity: Not Currently     Partners: Male     Comment: 5/4/17 single   Lifestyle    Physical activity:     Days per week: Not on file     Minutes per session: Not on file    Stress: Not on file   Relationships    Social connections:     Talks on phone: Not on file     Gets together: Not on file     Attends Jew service: Not on file     Active member of club or organization: Not on file     Attends meetings of clubs or organizations: Not on file     Relationship status: Not on file   Other Topics Concern    Not on file   Social History Narrative    Not on file       Current Outpatient Medications:     calcitRIOL (ROCALTROL) 0.25 MCG Cap, Take 1 capsule (0.25 mcg total) by mouth every Monday and Friday., Disp: 30 capsule, Rfl: 3    cholecalciferol, vitamin D3, 50,000 unit capsule, TAKE 1 CAPSULE BY MOUTH THREE TIMES A WEEK, Disp: , Rfl: 1    conjugated estrogens (PREMARIN) vaginal cream, Pea size amount to urethral area every 2 weeks, then 3x / week, Disp: 45 g, Rfl: 6    docosahexanoic acid/epa (FISH OIL ORAL), Take by mouth once daily., Disp: , Rfl:     ergocalciferol (ERGOCALCIFEROL) 50,000 unit Cap, Take 1 capsule (50,000 Units total) by mouth every 7 days., Disp: 12 capsule, Rfl: 1    FERROUS SULFATE ORAL, Take 28 mg by mouth once daily., Disp: , Rfl:     fluconazole (DIFLUCAN) 100 MG tablet, , Disp: , Rfl:     FLUoxetine (PROZAC) 20 MG capsule, , Disp: , Rfl:     fluticasone (FLONASE) 50 mcg/actuation nasal spray, 1 spray (50 mcg total) by Each Nare route 2 (two) times daily., Disp: 1 Bottle, Rfl: 11     hydroCHLOROthiazide (HYDRODIURIL) 25 MG tablet, Take 25 mg by mouth once daily., Disp: , Rfl:     INCRUSE ELLIPTA 62.5 mcg/actuation DsDv, , Disp: , Rfl:     irbesartan (AVAPRO) 300 MG tablet, TAKE ONE TABLET BY MOUTH IN THE EVENING, Disp: 90 tablet, Rfl: 3    levocetirizine (XYZAL) 5 MG tablet, Take 1 tablet (5 mg total) by mouth every evening., Disp: 30 tablet, Rfl: 11    liothyronine (CYTOMEL) 5 MCG Tab, Take 5 mcg by mouth once daily., Disp: , Rfl:     losartan (COZAAR) 50 MG tablet, losartan 50 mg tablet  Take 1 tablet every day by oral route., Disp: , Rfl:     metoprolol succinate (TOPROL-XL) 50 MG 24 hr tablet, Take 1 tablet (50 mg total) by mouth once daily. For high blood pressure, Disp: 30 tablet, Rfl: 11    mirabegron (MYRBETRIQ) 25 mg Tb24 ER tablet, Take 1 tablet (25 mg total) by mouth once daily., Disp: 30 tablet, Rfl: 11    oxybutynin (DITROPAN XL) 15 MG TR24, , Disp: , Rfl:     PHENYLEPH/MINERAL OIL/PETROLAT (PREPARATION H RECT), Place rectally once daily., Disp: , Rfl:     sodium bicarbonate 325 MG tablet, Take 1 tablet (325 mg total) by mouth 4 (four) times daily., Disp: 120 tablet, Rfl: 6    SPIRIVA RESPIMAT 2.5 mcg/actuation Mist, , Disp: , Rfl:     SYNTHROID 200 mcg tablet, , Disp: , Rfl:     traZODone (DESYREL) 300 MG tablet, TAKE 2 TABLETS BY MOUTH IN THE EVENING, Disp: 60 tablet, Rfl: 3    triamcinolone acetonide 0.1% (KENALOG) 0.1 % cream, , Disp: , Rfl:     triamcinolone acetonide 0.1% (KENALOG) 0.1 % cream, APPLY TOPICALLY 2 TIMES DAILY, Disp: 45 g, Rfl: 1    zinc (CHELATED ZINC) 50 mg Tab, Take by mouth once daily., Disp: , Rfl:     NIFEdipine (PROCARDIA-XL) 30 MG (OSM) 24 hr tablet, Take 1 tablet (30 mg total) by mouth once daily., Disp: 30 tablet, Rfl: 11    REVIEW OF SYSTEMS:  General: No fevers or chills; ENT: No sore throat; Allergy and Immunology: no persistent infections; Hematological and Lymphatic: No history of bleeding or easy bruising; Endocrine: negative;  Respiratory: no cough, shortness of breath, or wheezing; Cardiovascular: no chest pain or dyspnea on exertion; Gastrointestinal: no abdominal pain/back, change in bowel habits, or bloody stools; Genito-Urinary: no dysuria, trouble voiding, or hematuria; Musculoskeletal: edema and pain; Neurological: no TIA or stroke symptoms; Psychiatric: no nervousness, anxiety or depression.    PHYSICAL EXAM:      Pulse: 64         General appearance:  Alert, well-appearing, and in no distress.  Oriented to person, place, and time                    Neurological: Normal speech, no focal findings noted; CN II - XII grossly intact. RLE with sensation to light touch, LLE with sensation to light touch.            Musculoskeletal: Digits/nail without cyanosis/clubbing.  Strength 5/5 BLE.                    Neck: Supple, no significant adenopathy                  Chest:  No wheezes, symmetric air entry. No use of accessory muscles               Cardiac: Normal rate and regular rhythm            Abdomen: Soft, nontender, nondistended      Extremities:   2+ R DP pulse, 2+ L DP pulse      2+ RLE edema, 2+ LLE edema    Skin:  RLE no ulcer; LLE no ulcer      RLE no spider veins, LLE no spider veins      RLE no varicose veins, LLE no varicose veins    CEAP 3/3    LAB RESULTS:  No results found for: CBC  Lab Results   Component Value Date    LABPROT 10.4 06/10/2016    INR 1.0 06/10/2016     Lab Results   Component Value Date     03/11/2019    K 4.7 03/11/2019     (H) 03/11/2019    CO2 23 03/11/2019    GLU 94 03/11/2019    BUN 23 03/11/2019    CREATININE 1.8 (H) 03/11/2019    CALCIUM 8.5 (L) 03/11/2019    ANIONGAP 4 (L) 03/11/2019    EGFRNONAA 27.5 (A) 03/11/2019     Lab Results   Component Value Date    WBC 6.92 10/11/2018    RBC 3.60 (L) 10/11/2018    HGB 10.5 (L) 10/11/2018    HCT 35.9 (L) 10/11/2018     (H) 10/11/2018    MCH 29.2 10/11/2018    MCHC 29.2 (L) 10/11/2018    RDW 14.6 (H) 10/11/2018     10/11/2018     MPV 11.0 10/11/2018    GRAN 4.8 10/11/2018    GRAN 69.4 10/11/2018    LYMPH 1.3 10/11/2018    LYMPH 18.5 10/11/2018    MONO 0.6 10/11/2018    MONO 8.2 10/11/2018    EOS 0.2 10/11/2018    BASO 0.02 10/11/2018    EOSINOPHIL 3.5 10/11/2018    BASOPHIL 0.3 10/11/2018    DIFFMETHOD Automated 10/11/2018     .  Lab Results   Component Value Date    HGBA1C 4.6 01/31/2018       IMAGING:  All pertinent imaging has been reviewed and interpreted independently.    Venous US 7/30/19 Impression:  1. Right lower extremity venous ultrasound shows greater saphenous vein reflux at the calf, no SSV reflux.  There is no DVT.  2. Left lower extremity venous ultrasound shows greater saphenous vein reflux calf, + L SSV reflux.  There is no DVT.      IMP/PLAN:  73 y.o. female with   Patient Active Problem List   Diagnosis    Osteoporosis without pathological fracture    Menopausal state    Secondary hyperparathyroidism    Essential hypertension    Obesity, Class II, BMI 35-39.9    Vitamin D deficiency    Normocytic anemia    Rosacea    Bilateral chronic knee pain    Sleep disturbance    KING (obstructive sleep apnea)    CKD (chronic kidney disease), stage IV    Hypertensive CKD (chronic kidney disease)    Primary hyperparathyroidism    Pedal edema    Morbid obesity due to excess calories    Mild episode of recurrent major depressive disorder    Primary thyroid cancer    Chronic knee pain after total replacement of both knee joints    Gait instability    Chronic pain of right ankle    Restrictive pattern present on pulmonary function testing    Gastro-esophageal reflux    Metabolic acidosis    Mixed stress and urge urinary incontinence    Urinary urgency    Nocturia    CRISTEL (acute kidney injury)    Bladder disorder    being managed by PCP and specialists who is here today for evaluation of BLE edema.    -recommend compression with Rx stockings, elevation, dietary changes associated with water and sodium intake  discussed at length with patient  -Exercise   -Lymphedema clinic referral and outpatient pump therapy evaluation  -RTC 3 months for further evaluation    I spent 20 minutes evaluating this patient and greater than 50% of the time was spent counseling, coordinator care and discussing the plan of care.  All questions were answered and patient stated understanding with agreement with the above treatment plan.    Wilfredo Medina MD [facility]  Vascular and Endovascular Surgery

## 2019-08-01 NOTE — PATIENT INSTRUCTIONS
Putting on Compression Stockings     Turn the stocking inside-out, then fit it over your toes and heel.          Roll the stocking up your leg.            Once stockings are on, make sure the top of the stocking is about two fingers width below the crease of the knee (or the groin if you wear thigh-high stockings).          Use equipment, such as a stocking manda, or wear rubber gloves to make it easier to put on compression stockings.         Elastic compression stockings are prescribed to treat many vein problems. Wearing them may be the most important thing you do to manage your symptoms. The stockings fit tightly around your ankle, gradually reducing in pressure as they go up your legs. This helps keep blood flowing to your heart. As a result, swelling is reduced. Your healthcare provider will prescribe stockings at a safe pressure for you. He or she will also tell you how often to wear and remove the stockings. Follow these instructions closely. Also, do not buy or wear compression stockings without first seeing your healthcare provider.  Tips for wear and care  To wear stockings safely and to get the most benefit:  · Wear the length prescribed by your healthcare provider.  · Pull them to the designated height and no farther. Dont let them bunch at the top. This can restrict blood flow and increase swelling.  · Wear the stockings for the amount of time your healthcare provider recommends. Replace them when they start to feel loose. This will likely be every 3 to 6 months.  · Remove them as your healthcare provider directs. When removed, wash your legs. Then check your legs and feet for sores. Call your healthcare provider if you find a sore. Dont put the stockings back on unless your healthcare provider directs.   · Wash the stockings as instructed. They may need to be hand-washed.  Date Last Reviewed: 5/1/2016  © 5703-3191 [employer]. [address] [place] [address], [place] [address]. All rights  reserved. This information is not intended as a substitute for professional medical care. Always follow your healthcare professional's instructions.        Understanding Chronic Venous Insufficiency  Problems with the veins in the legs may lead to chronic venous insufficiency (CVI). CVI means that there is a long-term problem with the veins not being able to pump blood back to your heart. When this happens, blood stays in the legs and causes swelling and aching.   Two problems that may lead to chronic venous insufficiency are:  · Damaged valves. Valves keep blood flowing from the legs through the blood vessels and back to the heart. When the valves are damaged, blood does not flow as well.   · Deep vein thrombosis (DVT). Blood clots may form in the deep veins of the legs. This may cause pain, redness, and swelling in the legs. It may also block the flow of blood back to the heart. Seek immediate medical care if you have these symptoms.  · A blood clot in the leg can also break off and travel to the lungs. This is called pulmonary embolism (PE). In the lungs, the clot can cut off the flow of blood. This may cause chest pain, trouble breathing, sweating, a fast heartbeat, coughing (may cough up blood), and fainting. It is a medical emergency and may cause death. Call 911 if you have these symptoms.  · Healthcare providers call the two conditions, DVT and PE, venous thromboembolism (VTE).  CVI cant be cured, but you can control leg swelling to reduce the likelihood of ulcers (sores).  Recognizing the symptoms  Be aware of the following:  · If you stand or sit with your feet down for long periods, your legs may ache or feel heavy.  · Swollen ankles are possibly the most common symptom of CVI.  · As swelling increases, the skin over your ankles may show red spots or a brownish tinge. The skin may feel leathery or scaly, and may start to itch.  · If swelling is not controlled, an ulcer (open wound) may form.  What you  can do  Reduce your risk of developing ulcers by doing the following:  · Increase blood flow back to your heart by elevating your legs, exercising daily, and wearing elastic stockings.  · Boost blood flow in your legs by losing excess weight.  · If you must stand or sit in one place for a period of time, keep your blood moving by wiggling your toes, shifting your body position, and rising up on the balls of your feet.    Date Last Reviewed: 5/1/2016 © 2000-2017 [employer]. [address] [place] [address], [place] [address]. All rights reserved. This information is not intended as a substitute for professional medical care. Always follow your healthcare professional's instructions.        Tips for Using Less Salt    Most people with heart problems need to eat less salt (sodium). Reducing the amount of salt you eat may help control your blood pressure. The higher your blood pressure, the greater your risk for heart disease, stroke, blindness, and kidney problems.  At the store  · Make low-salt choices by reading labels carefully. Look for the total amount of sodium per serving.  · Use more fresh food. Buy more fruits and vegetables. Select lean meats, fish, and poultry.  · Use fewer frozen, canned, and packaged foods which often contain a lot of sodium.  · Use plain frozen vegetables without sauces or toppings. These products are often low- or no-sodium.  · Opt for reduced-sodium or no-salt-added versions of canned vegetables and soups.  In the kitchen  · Don't add salt to food when you're cooking. Season with flavorings such as onion, garlic, pepper, salt-free herbal blends, and lemon or lime juice.  · Use a cookbook containing low-salt recipes. It can give you ideas for tasty meals that are healthy for your heart.  · Sprinkle salt-free herbal blends on vegetables and meat.  · Drain and rinse canned foods, such as canned beans and vegetables, before cooking or eating.  Eating out  · Tell the  you're on a  low-salt diet. Ask questions about the menu.  · Order fish, chicken, and meat broiled, baked, poached, or grilled without salt, butter, or breading.  · Use lemon, pepper, and salt-free herb mixes to add flavor.  · Choose plain steamed rice, boiled noodles, and baked or boiled potatoes. Top potatoes with chives and a little sour cream.     Beware! Salt goes by many other names. Limit foods with these words listed as ingredients: salt, sodium, soy sauce, baking soda, baking powder, MSG, monosodium, Na (the chemical symbol for sodium). Some antacids are also high in salt.   Date Last Reviewed: 6/19/2015 © 2000-2017 [employer]. [address] [place] [address]. All rights reserved. This information is not intended as a substitute for professional medical care. Always follow your healthcare professional's instructions.        Low-Salt Diet  This diet removes foods that are high in salt. It also limits the amount of salt you use when cooking. It is most often used for people with high blood pressure, edema (fluid retention), and kidney, liver, or heart disease.  Table salt contains the mineral sodium. Your body needs sodium to work normally. But too much sodium can make your health problems worse. Your healthcare provider is recommending a low-salt (also called low-sodium) diet for you. Your total daily allowance of salt is 1,500 to 2,300 milligrams (mg). It is less than 1 teaspoon of table salt. This means you can have only about 500 to 700 mg of sodium at each meal. People with certain health problems should limit salt intake to the lower end of the recommended range.    When you cook, dont add much salt. If you can cook without using salt, even better. Dont add salt to your food at the table.  When shopping, read food labels. Salt is often called sodium on the label. Choose foods that are salt-free, low salt, or very low salt. Note that foods with reduced salt may not lower your salt intake  enough.    Beans, potatoes, and pasta  Ok: Dry beans, split peas, lentils, potatoes, rice, macaroni, pasta, spaghetti without added salt  Avoid: Potato chips, tortilla chips, and similar products  Breads and cereals  Ok: Low-sodium breads, rolls, cereals, and cakes; low-salt crackers, matzo crackers  Avoid: Salted crackers, pretzels, popcorn, Spanish toast, pancakes, muffins  Dairy  Ok: Milk, chocolate milk, hot chocolate mix, low-salt cheeses, and yogurt  Avoid: Processed cheese and cheese spreads; Roquefort, Camembert, and cottage cheese; buttermilk, instant breakfast drink  Desserts  Ok: Ice cream, frozen yogurt, juice bars, gelatin, cookies and pies, sugar, honey, jelly, hard candy  Avoid: Most pies, cakes and cookies prepared or processed with salt; instant pudding  Drinks  Ok: Tea, coffee, fizzy (carbonated) drinks, juices  Avoid: Flavored coffees, electrolyte replacement drinks, sports drinks  Meats  Ok: All fresh meat, fish, poultry, low-salt tuna, eggs, egg substitute  Avoid: Smoked, pickled, brine-cured, or salted meats and fish. This includes borden, chipped beef, corned beef, hot dogs, deli meats, ham, kosher meats, salt pork, sausage, canned tuna, salted codfish, smoked salmon, herring, sardines, or anchovies.  Seasonings and spices  Ok: Most seasonings are okay. Good substitutes for salt include: fresh herb blends, hot sauce, lemon, garlic, perez, vinegar, dry mustard, parsley, cilantro, horseradish, tomato paste, regular margarine, mayonnaise, unsalted butter, cream cheese, vegetable oil, cream, low-salt salad dressing and gravy.  Avoid: Regular ketchup, relishes, pickles, soy sauce, teriyaki sauce, Worcestershire sauce, BBQ sauce, tartar sauce, meat tenderizer, chili sauce, regular gravy, regular salad dressing, salted butter  Soups  Ok: Low-salt soups and broths made with allowed foods  Avoid: Bouillon cubes, soups with smoked or salted meats, regular soup and broth  Vegetables  Ok: Most vegetables  are okay; also low-salt tomato and vegetable juices  Avoid: Sauerkraut and other brine-soaked vegetables; pickles and other pickled vegetables; tomato juice, olives  Date Last Reviewed: 8/1/2016 © 2000-2017 [employer]. [address] [place] [address]. All rights reserved. This information is not intended as a substitute for professional medical care. Always follow your healthcare professional's instructions.        Low-Salt Choices  Eating salt (sodium) can make your body retain too much water. Excess water makes your heart work harder. Canned, packaged, and frozen foods are easy to prepare, but they are often high in sodium. Here are some ideas for low-salt foods you can easily prepare yourself.    For breakfast  · Fruit or 100% fruit juice  · Whole-wheat bread or an English muffin. Compare sodium content on labels.  · Low-fat milk or yogurt  · Unsalted eggs  · Shredded wheat  · Corn tortillas  · Unsalted steamed rice  · Regular (not instant) hot cereal, made without salt  Stay away from:  · Sausage, borden, and ham  · Flour tortillas  · Packaged muffins, pancakes, and biscuits  · Instant hot cereals  · Cottage cheese  For lunch and dinner  · Fresh fish, chicken, turkey, or meat--baked, broiled, or roasted without salt  · Dry beans, cooked without salt  · Tofu, stir-fried without salt  · Unsalted fresh fruit and vegetables, or frozen or canned fruit and vegetables with no added salt  Stay away from:  · Lunch or deli meat that is cured or smoked  · Cheese  · Tomato juice and catsup  · Canned vegetables, soups, and fish not labeled as no-salt-added or reduced sodium  · Packaged gravies and sauces  · Olives, pickles, and relish  · Bottled salad dressings  For snacks and desserts  · Yogurt  · Unsalted, air popped popcorn  · Unsalted nuts or seeds  Stay away from:  · Pies and cakes  · Packaged dessert mixes  · Pizza  · Canned and packaged puddings  · Pretzels, chips, crackers, and nuts--unless  the label says unsalted  Date Last Reviewed: 6/17/2015  © 0577-8792 [employer]. [address] [place] [address], [place] [address]. All rights reserved. This information is not intended as a substitute for professional medical care. Always follow your healthcare professional's instructions.          Lymphedema  The lymphatic system is made up of lymph vessels and lymph nodes, which carry a fluid called lymph. Lymph consists of waste from the cells. This fluid drains through lymph vessels under the skin to nearby lymph nodes. Lymph nodes filter waste products from the cells and kill any bacteria present before returning the lymph fluid to your blood circulation.  When the lymph vessels are damaged, lymph fluid cannot drain from tissues. This causes the lymph fluid to back up leading to swelling. This most often affects the arms or legs. Signs of lymphedema include heaviness, stiffness, or aching in an arm or leg. The limb may swell. The skin might look red. Shoes and rings may feel tight. Ankles and wrists might become less flexible.  The most common cause of damage to the lymph system is surgery or radiation for breast or testicular cancer. Other causes include repeated skin infections (cellulitis), burns, or injury to the arms or legs. It can take many years for symptoms of lymphedema to appear. Once present, lymphedema can become a chronic condition. This means the problem can be managed but not cured.   Treatment often includes use of compression garments, massage, and special exercises. Talk to your healthcare provider about these therapies and best treatment plan for you.  Home care  You can help keep the condition from getting worse. Follow all instructions you have been given. Do your exercises and wear your compression garments as recommended. Also, care for yourself as instructed.   · Small skin injuries like a cut, burn, or insect bite are more likely to cause a skin infection. Take special care to  avoid injury. If you have any signs of infection, call your healthcare provider right away.  · Take care of your skin and nails. Moisturize dry skin. Wear protective gloves when doing chores such as gardening.   · Don't wear tight clothing or jewelry on the affected arm or leg. Avoid carrying bags or other weight on the affected arm.  · Save with an electric razor instead of a razor blade.  · If at all possible, dont have blood pressure taken, get injections, or have blood drawn in the affected arm.  · If a leg is involved, dont cross your legs when sitting. Don't go barefoot.  · Avoid hot tubs, steam rooms, and saunas.  If you are at risk for lymphedema but have not developed it, these tips can help also help prevent it. Follow your healthcare provider's instructions.  Follow-up care  Follow up with your healthcare provider, or as advised.  Lymphedema can change the appearance of your body. This can be emotionally difficult to adjust to. You may benefit from a support group where practical advice and emotional support is offered. Individual counseling is another option.  When to seek medical advice  Call your healthcare provider right away if any of these occur:  · Swelling worsens  · Rash, blistering, or other skin changes on the affected limb  · Area of skin becomes red, painful, or warm to the touch  · A wound increases in pain, becomes warm, drains pus, or radiates red streaks  · Fever of 100.4°F (38°C) or higher, or as directed by your healthcare provider  Date Last Reviewed: 10/1/2016  © 7501-0405 [employer]. [address] [place] [address]. All rights reserved. This information is not intended as a substitute for professional medical care. Always follow your healthcare professional's instructions.        Managing Lymphedema After Cancer    Lymphedema is a problem that may occur after cancer surgery when lymph nodes are removed, or after radiation to the lymph nodes. Lymphedema can  occur months or even many years after cancer treatment. It is a chronic (ongoing) condition that has no cure. But steps can be taken to reduce or relieve symptoms. If left untreated, lymphedema can get worse. Treatment can lower your risk of infections and complications.  Understanding lymphedema  The lymphatic system helps the body fight infection. It is made up of a system of small vessels throughout the body. Lymph fluid travels through these vessels. Many tiny organs called lymph nodes are scattered through the system. These nodes filter lymph fluid. During surgery for cancer, nearby lymph nodes are often removed. Sometimes radiation is used to treat lymph nodes as part of cancer treatment. Both of these disrupt the flow of lymph fluid, which can lead to swelling. This is lymphedema. Lymphedema can affect one or both arms or legs, the genitals, or the belly, depending on the part of the body treated. Swelling can worsen and become severe. Skin sores or other problems can develop. Affected areas are also more likely to become infected.  Lymphedema treatment  There are no medications currently used to treat lymphedema. Instead, the most common treatment for lymphedema is complete decongestive therapy (CDT). This is a set of techniques used together to reduce your symptoms. CDT is done by trained therapists. To help show how well the treatment is working, your arms or legs may be measured before and after CDT. The treatment most often involves one or more of the following:  · Manual lymphatic drainage. This is a kind of massage that uses gentle pressure to help move lymph out of areas where it is collecting. It is done by a therapist or nurse with special training. It can also be learned and done at home.  · Intermittent pneumatic compression. This uses a device to apply and relieve pressure to the arms or legs. Sleeves are put over the arms or legs. A pump fills the sleeves with air. Then the air is let out. This  happens many times in a row.  · Compression bandages. This means wearing stretchy or padded fabric on the parts of the body with lymphedema. This may include bandages, tape, or other types of compression wraps. They help support your tissues so lymph can flow more freely. And they help prevent fluid lymph from building up.  · Therapeutic exercises. Some kinds of exercise may help your symptoms. These may include aerobic exercise, such as brisk walking. And may also include gradual weight-lifting exercises that build muscle.  · Skin and nail care. Proper care of your skin and nails will help prevent infection. See the Preventing Infection for Life box for more information.  · Compression garments. These are worn as often as needed, for life. These include sleeves, gloves, stockings, undershirt, or other types of special clothes. They compress parts of the body to help prevent lymph buildup. You may wear these during daily life, or at night when youre asleep.     Tips for living with lymphedema  · Avoid becoming too cold or too hot. This can cause the skin to swell and dry out. It can also cause more fluid to build up. Be careful around hot objects to avoid burns. Dont use hot tubs, saunas, or a heating pad.  · Avoid anything that squeezes the affected area. This may cause more swelling. Wear loose clothing and jewelry. If your legs are affected, dont wear tight socks or undergarments, and don't  cross your legs when you sit. This can block lymph drainage.  · Avoid weight gain. This can make your symptoms worse.  · Inform health care providers. If your arms are affected, tell your health care providers about your lymphedema before getting shots, an IV, or having your blood pressure taken.  · Call the doctor right awayt if you have  ¨ Fever of 100.4ºF (38ºC) or higher, or as directed by your healthcare provider  ¨ Signs of infection such as red blotches, warmth, or pain  ¨ Sudden increase in swelling   Preventing  infection for life  An important part of staying healthy with lymphedema is preventing infections in the areas that are swollen. Lymphedema makes it easier for bacteria to grow in those areas. To help prevent infection:  · Keep your skin clean, and moisturize it with lotion  · Be extra careful when shaving, and use a clean razor on clean skin  · Check your skin regularly for cuts, sores, bug bites, or other problems  · Use an antibacterial ointment if you have a cut or sore  · Don't pick at, or cut the skin around your fingernails. Use a cuticle stick to push your cuticles back.  · Trim your fingernails and toenails straight across to prevent ingrown nails  · If at all possible, don't allow blood to be taken or shots given in any affected limb  · Prevent skin burns by wearing sunscreen and using gloves when cooking or doing household work  · Wear shoes that fit well and do not cause blisters  · Use an insect repellent to avoid bug bites when outdoors.   Date Last Reviewed: 9/24/2015 © 2000-2017 [employer]. [address] [place] [address], [place] [address]. All rights reserved. This information is not intended as a substitute for professional medical care. Always follow your healthcare professional's instructions.

## 2019-08-02 LAB
LEFT GREAT SAPHENOUS DISTAL THIGH DIA: 4 CM
LEFT GREAT SAPHENOUS DISTAL THIGH REFLUX: 0 MS
LEFT GREAT SAPHENOUS JUNCTION DIA: 10 CM
LEFT GREAT SAPHENOUS JUNCTION REFLUX: 0 MS
LEFT GREAT SAPHENOUS KNEE DIA: 5 CM
LEFT GREAT SAPHENOUS KNEE REFLUX: 0 MS
LEFT GREAT SAPHENOUS MIDDLE THIGH DIA: 4 CM
LEFT GREAT SAPHENOUS MIDDLE THIGH REFLUX: 0 MS
LEFT GREAT SAPHENOUS PROXIMAL CALF DIA: 3 CM
LEFT GREAT SAPHENOUS PROXIMAL CALF REFLUX: 1069 MS
LEFT SMALL SAPHENOUS KNEE DIA: 2 CM
LEFT SMALL SAPHENOUS KNEE REFLUX: 1088 MS
LEFT SMALL SAPHENOUS SPJ DIA: 2 CM
LEFT SMALL SAPHENOUS SPJ REFLUX: 1069 MS
RIGHT GREAT SAPHENOUS DISTAL THIGH DIA: 5 CM
RIGHT GREAT SAPHENOUS DISTAL THIGH REFLUX: 0 MS
RIGHT GREAT SAPHENOUS JUNCTION DIA: 9 CM
RIGHT GREAT SAPHENOUS JUNCTION REFLUX: 0 MS
RIGHT GREAT SAPHENOUS KNEE DIA: 3 CM
RIGHT GREAT SAPHENOUS KNEE REFLUX: 0 MS
RIGHT GREAT SAPHENOUS MIDDLE THIGH DIA: 4 CM
RIGHT GREAT SAPHENOUS MIDDLE THIGH REFLUX: 0 MS
RIGHT GREAT SAPHENOUS PROXIMAL CALF DIA: 3 CM
RIGHT GREAT SAPHENOUS PROXIMAL CALF REFLUX: 0 MS
RIGHT SMALL SAPHENOUS KNEE DIA: 2 CM
RIGHT SMALL SAPHENOUS KNEE REFLUX: 0 MS
RIGHT SMALL SAPHENOUS SPJ DIA: 3 CM
RIGHT SMALL SAPHENOUS SPJ REFLUX: 0 MS

## 2019-08-13 ENCOUNTER — TELEPHONE (OUTPATIENT)
Dept: VASCULAR SURGERY | Facility: CLINIC | Age: 74
End: 2019-08-13

## 2019-08-13 NOTE — TELEPHONE ENCOUNTER
Patient contacted office saying that the referral for  was never sent. Explained that it was faxed. She stated she contacted  and they did not have a referral. Explained would fax again. Faxed  outpatient rehab and attempted to contact them. Left message at  to return the phone call.

## 2019-08-14 ENCOUNTER — TELEPHONE (OUTPATIENT)
Dept: VASCULAR SURGERY | Facility: CLINIC | Age: 74
End: 2019-08-14

## 2019-08-14 DIAGNOSIS — I89.0 LYMPHEDEMA OF BOTH LOWER EXTREMITIES: Primary | ICD-10-CM

## 2019-08-14 NOTE — TELEPHONE ENCOUNTER
Patient called stating that [facility] had a 3mt wait. Explained could send her to [facility] in [place] but not sure how long their wait is. She stated that she would like a referral sent to [facility] so she can try there.

## 2019-09-18 ENCOUNTER — PATIENT MESSAGE (OUTPATIENT)
Dept: UROLOGY | Facility: CLINIC | Age: 74
End: 2019-09-18

## 2019-09-23 ENCOUNTER — PATIENT OUTREACH (OUTPATIENT)
Dept: ADMINISTRATIVE | Facility: HOSPITAL | Age: 74
End: 2019-09-23

## 2019-10-04 ENCOUNTER — RESEARCH ENCOUNTER (OUTPATIENT)
Dept: UROLOGY | Facility: CLINIC | Age: 74
End: 2019-10-04

## 2019-10-04 NOTE — PROGRESS NOTES
"Dr. Anderson Probiotic Study Subject 12.    Called patient to schedule a follow up visit as she did not respond to previous [facility] message. She is out of town for about a month. Reports that she did have symptoms of a UTI and saw her PCP "about 3 weeks ago". Said she took cipro. However, review of outside medications lists Nitrofurantonin on 8/7/19 and Amoxicillin on 8/15/19. Asymptomatic at this time. Denies taking d-mannose, probiotic or using estrogen cream. Also, denies taking Myrbetriq and Oxybutinin for bladder control. Told me to call her back in about a month to discuss completion of this study.   "

## 2019-10-18 ENCOUNTER — LAB VISIT (OUTPATIENT)
Dept: LAB | Facility: HOSPITAL | Age: 74
End: 2019-10-18
Attending: STUDENT IN AN ORGANIZED HEALTH CARE EDUCATION/TRAINING PROGRAM
Payer: MEDICARE

## 2019-10-18 DIAGNOSIS — I25.5 ISCHEMIC CARDIOMYOPATHY: ICD-10-CM

## 2019-10-18 DIAGNOSIS — N81.0 FEMALE URETHROCELE: ICD-10-CM

## 2019-10-18 DIAGNOSIS — I47.10 PAROXYSMAL SUPRAVENTRICULAR TACHYCARDIA: ICD-10-CM

## 2019-10-18 DIAGNOSIS — I12.9 PARENCHYMAL RENAL HYPERTENSION: Primary | ICD-10-CM

## 2019-10-18 DIAGNOSIS — I42.0 CONGESTIVE CARDIOMYOPATHY: ICD-10-CM

## 2019-10-18 DIAGNOSIS — R82.79 URINE CULTURE POSITIVE: ICD-10-CM

## 2019-10-18 DIAGNOSIS — I51.9 MYXEDEMA HEART DISEASE: ICD-10-CM

## 2019-10-18 DIAGNOSIS — E03.9 MYXEDEMA HEART DISEASE: ICD-10-CM

## 2019-10-18 DIAGNOSIS — N18.30 CHRONIC KIDNEY DISEASE, STAGE III (MODERATE): ICD-10-CM

## 2019-10-18 DIAGNOSIS — N25.81 SECONDARY HYPERPARATHYROIDISM OF RENAL ORIGIN: ICD-10-CM

## 2019-10-18 DIAGNOSIS — E55.9 AVITAMINOSIS D: ICD-10-CM

## 2019-10-18 DIAGNOSIS — Z85.850 PERSONAL HISTORY OF MALIGNANT NEOPLASM OF THYROID: ICD-10-CM

## 2019-10-18 DIAGNOSIS — E21.3 HYPERPARATHYROIDISM, UNSPECIFIED: ICD-10-CM

## 2019-10-18 LAB
25(OH)D3+25(OH)D2 SERPL-MCNC: 52 NG/ML (ref 30–96)
ALBUMIN SERPL BCP-MCNC: 3.6 G/DL (ref 3.5–5.2)
ALP SERPL-CCNC: 148 U/L (ref 55–135)
ALT SERPL W/O P-5'-P-CCNC: 31 U/L (ref 10–44)
ANION GAP SERPL CALC-SCNC: 16 MMOL/L (ref 8–16)
AST SERPL-CCNC: 32 U/L (ref 10–40)
BASOPHILS # BLD AUTO: 0.03 K/UL (ref 0–0.2)
BASOPHILS NFR BLD: 0.4 % (ref 0–1.9)
BILIRUB SERPL-MCNC: 0.5 MG/DL (ref 0.1–1)
BILIRUB UR QL STRIP: NEGATIVE
BUN SERPL-MCNC: 26 MG/DL (ref 8–23)
CALCIUM SERPL-MCNC: 7.9 MG/DL (ref 8.7–10.5)
CHLORIDE SERPL-SCNC: 102 MMOL/L (ref 95–110)
CLARITY UR: CLEAR
CO2 SERPL-SCNC: 22 MMOL/L (ref 23–29)
COLOR UR: YELLOW
CREAT SERPL-MCNC: 1.3 MG/DL (ref 0.5–1.4)
DIFFERENTIAL METHOD: ABNORMAL
EOSINOPHIL # BLD AUTO: 0.3 K/UL (ref 0–0.5)
EOSINOPHIL NFR BLD: 3.6 % (ref 0–8)
ERYTHROCYTE [DISTWIDTH] IN BLOOD BY AUTOMATED COUNT: 14.8 % (ref 11.5–14.5)
EST. GFR  (AFRICAN AMERICAN): 46.7 ML/MIN/1.73 M^2
EST. GFR  (NON AFRICAN AMERICAN): 40.5 ML/MIN/1.73 M^2
GLUCOSE SERPL-MCNC: 104 MG/DL (ref 70–110)
GLUCOSE UR QL STRIP: NEGATIVE
HCT VFR BLD AUTO: 39.1 % (ref 37–48.5)
HGB BLD-MCNC: 11.9 G/DL (ref 12–16)
HGB UR QL STRIP: NEGATIVE
IMM GRANULOCYTES # BLD AUTO: 0.02 K/UL (ref 0–0.04)
IMM GRANULOCYTES NFR BLD AUTO: 0.3 % (ref 0–0.5)
KETONES UR QL STRIP: NEGATIVE
LEUKOCYTE ESTERASE UR QL STRIP: NEGATIVE
LYMPHOCYTES # BLD AUTO: 1.3 K/UL (ref 1–4.8)
LYMPHOCYTES NFR BLD: 18 % (ref 18–48)
MAGNESIUM SERPL-MCNC: 1.8 MG/DL (ref 1.6–2.6)
MCH RBC QN AUTO: 28.7 PG (ref 27–31)
MCHC RBC AUTO-ENTMCNC: 30.4 G/DL (ref 32–36)
MCV RBC AUTO: 94 FL (ref 82–98)
MONOCYTES # BLD AUTO: 0.6 K/UL (ref 0.3–1)
MONOCYTES NFR BLD: 8.8 % (ref 4–15)
NEUTROPHILS # BLD AUTO: 5 K/UL (ref 1.8–7.7)
NEUTROPHILS NFR BLD: 68.9 % (ref 38–73)
NITRITE UR QL STRIP: NEGATIVE
NRBC BLD-RTO: 0 /100 WBC
PH UR STRIP: 6 [PH] (ref 5–8)
PHOSPHATE SERPL-MCNC: 3.9 MG/DL (ref 2.7–4.5)
PLATELET # BLD AUTO: 260 K/UL (ref 150–350)
PMV BLD AUTO: 10.7 FL (ref 9.2–12.9)
POTASSIUM SERPL-SCNC: 4.3 MMOL/L (ref 3.5–5.1)
PROT SERPL-MCNC: 7.3 G/DL (ref 6–8.4)
PROT UR QL STRIP: ABNORMAL
PTH-INTACT SERPL-MCNC: 126 PG/ML (ref 9–77)
RBC # BLD AUTO: 4.14 M/UL (ref 4–5.4)
SODIUM SERPL-SCNC: 140 MMOL/L (ref 136–145)
SP GR UR STRIP: 1.01 (ref 1–1.03)
URATE SERPL-MCNC: 5 MG/DL (ref 2.4–5.7)
URN SPEC COLLECT METH UR: ABNORMAL
UROBILINOGEN UR STRIP-ACNC: NEGATIVE EU/DL
WBC # BLD AUTO: 7.26 K/UL (ref 3.9–12.7)

## 2019-10-18 PROCEDURE — 83735 ASSAY OF MAGNESIUM: CPT

## 2019-10-18 PROCEDURE — 83970 ASSAY OF PARATHORMONE: CPT

## 2019-10-18 PROCEDURE — 84550 ASSAY OF BLOOD/URIC ACID: CPT

## 2019-10-18 PROCEDURE — 80053 COMPREHEN METABOLIC PANEL: CPT

## 2019-10-18 PROCEDURE — 36415 COLL VENOUS BLD VENIPUNCTURE: CPT

## 2019-10-18 PROCEDURE — 82306 VITAMIN D 25 HYDROXY: CPT

## 2019-10-18 PROCEDURE — 87086 URINE CULTURE/COLONY COUNT: CPT

## 2019-10-18 PROCEDURE — 85025 COMPLETE CBC W/AUTO DIFF WBC: CPT

## 2019-10-18 PROCEDURE — 84100 ASSAY OF PHOSPHORUS: CPT

## 2019-10-18 PROCEDURE — 81003 URINALYSIS AUTO W/O SCOPE: CPT

## 2019-10-20 LAB — BACTERIA UR CULT: NORMAL

## 2019-11-07 ENCOUNTER — LAB VISIT (OUTPATIENT)
Dept: LAB | Facility: HOSPITAL | Age: 74
End: 2019-11-07
Attending: INTERNAL MEDICINE
Payer: MEDICARE

## 2019-11-07 DIAGNOSIS — C73 MALIGNANT NEOPLASM OF THYROID GLAND: Primary | ICD-10-CM

## 2019-11-07 DIAGNOSIS — E55.9 AVITAMINOSIS D: ICD-10-CM

## 2019-11-07 LAB
25(OH)D3+25(OH)D2 SERPL-MCNC: 51 NG/ML (ref 30–96)
ALBUMIN SERPL BCP-MCNC: 3.5 G/DL (ref 3.5–5.2)
ALP SERPL-CCNC: 154 U/L (ref 55–135)
ALT SERPL W/O P-5'-P-CCNC: 25 U/L (ref 10–44)
ANION GAP SERPL CALC-SCNC: 10 MMOL/L (ref 8–16)
AST SERPL-CCNC: 24 U/L (ref 10–40)
BILIRUB SERPL-MCNC: 0.6 MG/DL (ref 0.1–1)
BUN SERPL-MCNC: 29 MG/DL (ref 8–23)
CALCIUM SERPL-MCNC: 8.4 MG/DL (ref 8.7–10.5)
CHLORIDE SERPL-SCNC: 110 MMOL/L (ref 95–110)
CO2 SERPL-SCNC: 20 MMOL/L (ref 23–29)
CREAT SERPL-MCNC: 1.4 MG/DL (ref 0.5–1.4)
EST. GFR  (AFRICAN AMERICAN): 42.7 ML/MIN/1.73 M^2
EST. GFR  (NON AFRICAN AMERICAN): 37 ML/MIN/1.73 M^2
GLUCOSE SERPL-MCNC: 96 MG/DL (ref 70–110)
POTASSIUM SERPL-SCNC: 4.1 MMOL/L (ref 3.5–5.1)
PROT SERPL-MCNC: 6.9 G/DL (ref 6–8.4)
SODIUM SERPL-SCNC: 140 MMOL/L (ref 136–145)
T3 SERPL-MCNC: 84 NG/DL (ref 60–180)
T4 FREE SERPL-MCNC: 1.51 NG/DL (ref 0.71–1.51)
T4 SERPL-MCNC: 10.4 UG/DL (ref 4.5–11.5)
TSH SERPL DL<=0.005 MIU/L-ACNC: <0.01 UIU/ML (ref 0.34–5.6)

## 2019-11-07 PROCEDURE — 36415 COLL VENOUS BLD VENIPUNCTURE: CPT

## 2019-11-07 PROCEDURE — 82306 VITAMIN D 25 HYDROXY: CPT

## 2019-11-07 PROCEDURE — 84436 ASSAY OF TOTAL THYROXINE: CPT

## 2019-11-07 PROCEDURE — 84443 ASSAY THYROID STIM HORMONE: CPT

## 2019-11-07 PROCEDURE — 84480 ASSAY TRIIODOTHYRONINE (T3): CPT

## 2019-11-07 PROCEDURE — 80053 COMPREHEN METABOLIC PANEL: CPT

## 2019-11-07 PROCEDURE — 84439 ASSAY OF FREE THYROXINE: CPT

## 2019-11-08 DIAGNOSIS — N17.9 AKI (ACUTE KIDNEY INJURY): ICD-10-CM

## 2019-11-08 RX ORDER — METOPROLOL SUCCINATE 50 MG/1
50 TABLET, EXTENDED RELEASE ORAL DAILY
Qty: 30 TABLET | Refills: 1 | Status: SHIPPED | OUTPATIENT
Start: 2019-11-08

## 2019-12-09 RX ORDER — ERGOCALCIFEROL 1.25 MG/1
CAPSULE ORAL
Qty: 12 CAPSULE | Refills: 1 | OUTPATIENT
Start: 2019-12-09

## 2019-12-11 ENCOUNTER — TELEPHONE (OUTPATIENT)
Dept: UROLOGY | Facility: CLINIC | Age: 74
End: 2019-12-11

## 2019-12-11 NOTE — TELEPHONE ENCOUNTER
DR. VRAGAS PROBIOTIC STUDY   SUBJECT 12  December 11, 2019    Spoke with patient regarding a follow up appointment in Urology for recurrent UTIs. She is past week 52 of the study but has been unavailable. States she never knows when she has an infection. Her current urological issues include mixed incontinence and nocturia x2. Offered her an appointment for an evaluation which she accepted. Will come into clinic next week.

## 2019-12-16 ENCOUNTER — OFFICE VISIT (OUTPATIENT)
Dept: UROLOGY | Facility: CLINIC | Age: 74
End: 2019-12-16
Payer: MEDICARE

## 2019-12-16 ENCOUNTER — RESEARCH ENCOUNTER (OUTPATIENT)
Dept: UROLOGY | Facility: CLINIC | Age: 74
End: 2019-12-16

## 2019-12-16 VITALS
HEIGHT: 69 IN | DIASTOLIC BLOOD PRESSURE: 62 MMHG | SYSTOLIC BLOOD PRESSURE: 136 MMHG | WEIGHT: 227.06 LBS | BODY MASS INDEX: 33.63 KG/M2 | HEART RATE: 72 BPM

## 2019-12-16 DIAGNOSIS — N39.0 RECURRENT UTI: ICD-10-CM

## 2019-12-16 DIAGNOSIS — N39.46 MIXED INCONTINENCE: Primary | ICD-10-CM

## 2019-12-16 DIAGNOSIS — R39.15 URGENCY OF URINATION: ICD-10-CM

## 2019-12-16 DIAGNOSIS — R35.0 FREQUENCY OF URINATION: ICD-10-CM

## 2019-12-16 LAB
BACTERIA #/AREA URNS AUTO: ABNORMAL /HPF
BILIRUB UR QL STRIP: NEGATIVE
CLARITY UR REFRACT.AUTO: CLEAR
COLOR UR AUTO: YELLOW
GLUCOSE UR QL STRIP: NEGATIVE
HGB UR QL STRIP: NEGATIVE
HYALINE CASTS UR QL AUTO: 3 /LPF
KETONES UR QL STRIP: NEGATIVE
LEUKOCYTE ESTERASE UR QL STRIP: ABNORMAL
MICROSCOPIC COMMENT: ABNORMAL
NITRITE UR QL STRIP: NEGATIVE
PH UR STRIP: 5 [PH] (ref 5–8)
PROT UR QL STRIP: NEGATIVE
RBC #/AREA URNS AUTO: 1 /HPF (ref 0–4)
SP GR UR STRIP: 1.01 (ref 1–1.03)
SQUAMOUS #/AREA URNS AUTO: 0 /HPF
URN SPEC COLLECT METH UR: ABNORMAL
WBC #/AREA URNS AUTO: 4 /HPF (ref 0–5)

## 2019-12-16 PROCEDURE — 51701 PR INSERTION OF NON-INDWELLING BLADDER CATHETERIZATION FOR RESIDUAL UR: ICD-10-PCS | Mod: S$PBB,,, | Performed by: PHYSICIAN ASSISTANT

## 2019-12-16 PROCEDURE — 51701 INSERT BLADDER CATHETER: CPT | Mod: S$PBB,,, | Performed by: PHYSICIAN ASSISTANT

## 2019-12-16 PROCEDURE — 1159F MED LIST DOCD IN RCRD: CPT | Mod: ,,, | Performed by: PHYSICIAN ASSISTANT

## 2019-12-16 PROCEDURE — 99214 OFFICE O/P EST MOD 30 MIN: CPT | Mod: S$PBB,25,, | Performed by: PHYSICIAN ASSISTANT

## 2019-12-16 PROCEDURE — 1159F PR MEDICATION LIST DOCUMENTED IN MEDICAL RECORD: ICD-10-PCS | Mod: ,,, | Performed by: PHYSICIAN ASSISTANT

## 2019-12-16 PROCEDURE — 1126F AMNT PAIN NOTED NONE PRSNT: CPT | Mod: ,,, | Performed by: PHYSICIAN ASSISTANT

## 2019-12-16 PROCEDURE — 99214 PR OFFICE/OUTPT VISIT, EST, LEVL IV, 30-39 MIN: ICD-10-PCS | Mod: S$PBB,25,, | Performed by: PHYSICIAN ASSISTANT

## 2019-12-16 PROCEDURE — 87077 CULTURE AEROBIC IDENTIFY: CPT

## 2019-12-16 PROCEDURE — 99999 PR PBB SHADOW E&M-EST. PATIENT-LVL V: ICD-10-PCS | Mod: PBBFAC,,, | Performed by: PHYSICIAN ASSISTANT

## 2019-12-16 PROCEDURE — 87186 SC STD MICRODIL/AGAR DIL: CPT

## 2019-12-16 PROCEDURE — 99999 PR PBB SHADOW E&M-EST. PATIENT-LVL V: CPT | Mod: PBBFAC,,, | Performed by: PHYSICIAN ASSISTANT

## 2019-12-16 PROCEDURE — 87088 URINE BACTERIA CULTURE: CPT

## 2019-12-16 PROCEDURE — 1126F PR PAIN SEVERITY QUANTIFIED, NO PAIN PRESENT: ICD-10-PCS | Mod: ,,, | Performed by: PHYSICIAN ASSISTANT

## 2019-12-16 PROCEDURE — 51701 INSERT BLADDER CATHETER: CPT | Mod: PBBFAC | Performed by: PHYSICIAN ASSISTANT

## 2019-12-16 PROCEDURE — 81001 URINALYSIS AUTO W/SCOPE: CPT

## 2019-12-16 PROCEDURE — 99215 OFFICE O/P EST HI 40 MIN: CPT | Mod: PBBFAC,25 | Performed by: PHYSICIAN ASSISTANT

## 2019-12-16 PROCEDURE — 87086 URINE CULTURE/COLONY COUNT: CPT

## 2019-12-16 RX ORDER — FUROSEMIDE 20 MG/1
20 TABLET ORAL
COMMUNITY
Start: 2019-05-13

## 2019-12-16 RX ORDER — ERGOCALCIFEROL 1.25 MG/1
50000 CAPSULE ORAL
COMMUNITY

## 2019-12-16 RX ORDER — METOPROLOL SUCCINATE 25 MG/1
50 TABLET, EXTENDED RELEASE ORAL
COMMUNITY

## 2019-12-16 RX ORDER — FUROSEMIDE 20 MG/1
20 TABLET ORAL DAILY
Refills: 11 | COMMUNITY
Start: 2019-10-30

## 2019-12-16 RX ORDER — CALCITRIOL 0.5 UG/1
CAPSULE ORAL
Refills: 6 | COMMUNITY
Start: 2019-10-20

## 2019-12-16 RX ORDER — LEVOTHYROXINE SODIUM 300 UG/1
TABLET ORAL
Refills: 1 | COMMUNITY
Start: 2019-12-12

## 2019-12-16 RX ORDER — SODIUM BICARBONATE 325 MG/1
325 TABLET ORAL
COMMUNITY
Start: 2019-08-22

## 2019-12-16 RX ORDER — FLUOXETINE HYDROCHLORIDE 20 MG/1
20 CAPSULE ORAL
COMMUNITY

## 2019-12-16 RX ORDER — QUETIAPINE FUMARATE 25 MG/1
25 TABLET, FILM COATED ORAL NIGHTLY PRN
Refills: 0 | COMMUNITY
Start: 2019-12-12

## 2019-12-16 RX ORDER — CALCITRIOL 0.5 UG/1
0.5 CAPSULE ORAL
COMMUNITY

## 2019-12-16 RX ORDER — MEMANTINE HYDROCHLORIDE 10 MG/1
TABLET ORAL
Refills: 1 | COMMUNITY
Start: 2019-12-09

## 2019-12-16 NOTE — LETTER
December 17, 2019      Lion Champion MD  [address]  [address]  [facility] [address]           [facility] - Urology 4th Floor  [address]  [place] [address]  Phone: [phone]          Patient: Luisana Chun   MR Number: [id number]   YOB: 1945   Date of Visit: 12/16/2019       Dear Dr. Lion Champion:    Thank you for referring Luisana Chun to me for evaluation. Attached you will find relevant portions of my assessment and plan of care.    If you have questions, please do not hesitate to call me. I look forward to following Luisana Chun along with you.    Sincerely,    MIGUEL Murray  CC:  No Recipients    If you would like to receive this communication electronically, please contact [email or web address] or [phone] to request more information on [employer] access.    For providers and/or their staff who would like to refer a patient to [facility], please contact us through our one-stop-shop provider referral line, [facility], at [phone].    If you feel you have received this communication in error or would no longer like to receive these types of communications, please e-mail [email or web address]

## 2019-12-16 NOTE — PROGRESS NOTES
CHIEF COMPLAINT:    Mrs. Chun is a 74 y.o. female presenting for nocturia and urinary incontinence.  PRESENTING ILLNESS:    Luisana Chun is a 74 y.o. female with a PMH of HTN, CKD,  who presents for nocturia and urinary incontinence.   She has urinary incontinence with coughing or sneezing.  Sometimes she has urge incontinence.  She does not wear a pad but sometimes she puts a wad of toilet paper in her underwear.  No dysuria.  She reports increase frequency but states the cold weather stimulates her bladder.  She also has urge incontinence.    She is not taking oxybutynin, myrbetriq or vaginal estrogen.  she stopped taking oxybutynin due to it causing dry mouth.  She ran out of myrbetriq and never got her prescription renewed.     Very rarely does she get constipated.  She usually experiences diarrhea.     She is not taking d- mannose.  She is a research patient.      FUDS/cysto 10/1/18   Analysis:  1. Decreased bladder capacity  2. Normal bladder compliance  3. Increased sensation  4. No leak noted  5. Some increased sphincter activity with voiding, although appears to be voluntary  6. Mostly complete emptying  Normal cysto      Component      Latest Ref Rng & Units 10/18/2019 6/26/2019   Urine Culture, Routine       No significant growth ESCHERICHIA COLI (A) . . .     Component      Latest Ref Rng & Units 12/13/2018   Urine Culture, Routine       ESCHERICHIA COLI . . .     Component      Latest Ref Rng & Units 9/19/2018   Urine Culture, Routine       KLEBSIELLA PNEUMONIAE ESBL . . .     Component      Latest Ref Rng & Units 6/13/2018   Urine Culture, Routine       KLEBSIELLA PNEUMONIAE ESBL . . .     Component      Latest Ref Rng & Units 3/6/2018   Urine Culture, Routine       KLEBSIELLA PNEUMONIAE ESBL . . .     Component      Latest Ref Rng & Units 1/8/2018   Urine Culture, Routine       KLEBSIELLA PNEUMONIAE ESBL . . .     Component      Latest Ref Rng & Units 10/27/2017 9/29/2017   Urine Culture,  Routine       ESCHERICHIA COLI . . . ESCHERICHIA COLI . . .     Component      Latest Ref Rng & Units 6/12/2017 5/26/2017   Urine Culture, Routine       No significant growth ESCHERICHIA COLI . . .     REVIEW OF SYSTEMS:    Constitutional: Negative for fever and chills.   HENT: Positive for hearing loss.   Eyes: Negative for visual disturbance.   Respiratory: Negative for shortness of breath.   Cardiovascular: Negative for chest pain.   Gastrointestinal: Negative for vomiting, and constipation.   Genitourinary:  See HPI  Neurological: Negative for dizziness.   Hematological: Does not bruise/bleed easily.   Psychiatric/Behavioral: Negative for confusion.     PATIENT HISTORY:    Past Medical History:   Diagnosis Date    Hypertension 1968    Kidney disease, chronic, stage III (GFR 30-59 ml/min) 2010    Obesity     Osteoporosis 2016    Thyroid cancer 2017    Thyroid disease 2013       Past Surgical History:   Procedure Laterality Date    APPENDECTOMY  1985    BACK SURGERY  february 2016    COLONOSCOPY      DILATION AND CURETTAGE OF UTERUS  1996    ESOPHAGOGASTRODUODENOSCOPY      FACIAL COSMETIC SURGERY  1980, 2005    FLUOROSCOPIC URODYNAMIC STUDY N/A 10/1/2018    Procedure: URODYNAMIC STUDY, FLUOROSCOPIC;  Surgeon: Melany Anderson MD;  Location: [address];  Service: Urology;  Laterality: N/A;  1 hour    KNEE SURGERY Bilateral 1995, 2010    left toe Left 1995    SKIN SURGERY Right 05/2018    Right Shoulder    TONSILLECTOMY  1955    tumor biopsy thyroid         Family History   Problem Relation Age of Onset    Hypertension Mother     Heart disease Mother     Macular degeneration Mother     Hypertension Father     Cataracts Father     Glaucoma Father     Macular degeneration Father     Glaucoma Brother     Macular degeneration Brother     No Known Problems Sister     No Known Problems Maternal Aunt     No Known Problems Maternal Uncle     No Known Problems Paternal Aunt     No  Known Problems Paternal Uncle     Macular degeneration Maternal Grandmother     Macular degeneration Maternal Grandfather     Macular degeneration Paternal Grandmother     Glaucoma Paternal Grandfather     Macular degeneration Paternal Grandfather     Amblyopia Neg Hx     Blindness Neg Hx     Cancer Neg Hx     Diabetes Neg Hx     Retinal detachment Neg Hx     Strabismus Neg Hx     Stroke Neg Hx     Thyroid disease Neg Hx        Social History     Socioeconomic History    Marital status:      Spouse name: Not on file    Number of children: Not on file    Years of education: Not on file    Highest education level: Not on file   Occupational History    Not on file   Social Needs    Financial resource strain: Not on file    Food insecurity:     Worry: Not on file     Inability: Not on file    Transportation needs:     Medical: Not on file     Non-medical: Not on file   Tobacco Use    Smoking status: Former Smoker    Smokeless tobacco: Never Used   Substance and Sexual Activity    Alcohol use: Yes     Alcohol/week: 0.0 standard drinks     Comment: social    Drug use: No    Sexual activity: Not Currently     Partners: Male     Comment: 5/4/17 single   Lifestyle    Physical activity:     Days per week: Not on file     Minutes per session: Not on file    Stress: Not on file   Relationships    Social connections:     Talks on phone: Not on file     Gets together: Not on file     Attends Christian service: Not on file     Active member of club or organization: Not on file     Attends meetings of clubs or organizations: Not on file     Relationship status: Not on file   Other Topics Concern    Not on file   Social History Narrative    Not on file       Allergies:  Patient has no known allergies.    Medications:    Current Outpatient Medications:     calcitRIOL (ROCALTROL) 0.5 MCG Cap, Take 0.5 mcg by mouth., Disp: , Rfl:     calcitRIOL (ROCALTROL) 0.5 MCG Cap, TAKE 1 CAPSULE BY MOUTH  ONCE DAILY FOR 90 DAYS, Disp: , Rfl: 6    cholecalciferol, vitamin D3, 50,000 unit capsule, TAKE 1 CAPSULE BY MOUTH THREE TIMES A WEEK, Disp: , Rfl: 1    docosahexanoic acid/epa (FISH OIL ORAL), Take by mouth once daily., Disp: , Rfl:     ergocalciferol (ERGOCALCIFEROL) 50,000 unit Cap, Take 1 capsule (50,000 Units total) by mouth every 7 days., Disp: 12 capsule, Rfl: 1    ergocalciferol (ERGOCALCIFEROL) 50,000 unit Cap, Take 50,000 Units by mouth., Disp: , Rfl:     FERROUS SULFATE ORAL, Take 28 mg by mouth once daily., Disp: , Rfl:     FLUoxetine (PROZAC) 20 MG capsule, , Disp: , Rfl:     FLUoxetine 20 MG capsule, Take 20 mg by mouth., Disp: , Rfl:     fluticasone (FLONASE) 50 mcg/actuation nasal spray, 1 spray (50 mcg total) by Each Nare route 2 (two) times daily., Disp: 1 Bottle, Rfl: 11    FLUZONE HIGH-DOSE 2019-20, PF, 180 mcg/0.5 mL Syrg, PHARMACIST ADMINISTERED IMMUNIZATION ADMINISTERED AT TIME OF DISPENSING, Disp: , Rfl: 0    furosemide (LASIX) 20 MG tablet, Take 20 mg by mouth., Disp: , Rfl:     furosemide (LASIX) 20 MG tablet, Take 20 mg by mouth once daily., Disp: , Rfl: 11    glucosamine sulfate (SYNOVACIN ORAL), Glucosamine  one tablet q day, Disp: , Rfl:     hydroCHLOROthiazide (HYDRODIURIL) 25 MG tablet, Take 25 mg by mouth once daily., Disp: , Rfl:     irbesartan (AVAPRO) 300 MG tablet, TAKE ONE TABLET BY MOUTH IN THE EVENING, Disp: 90 tablet, Rfl: 3    iron,carb/vit C/vit B12/folic (ICAR-C PLUS ORAL), Iron 100 Plus  q day, Disp: , Rfl:     levothyroxine (SYNTHROID) 300 MCG Tab, TAKE 1 TABLET BY MOUTH ONCE DAILY IN THE MORNING ON AN EMPTY STOMACH FOR 30 DAYS, Disp: , Rfl: 1    memantine (NAMENDA) 10 MG Tab, TAKE 1 TABLET BY MOUTH TWICE DAILY WITH SUPPER FOR 30 DAYS, Disp: , Rfl: 1    metoprolol succinate (TOPROL-XL) 25 MG 24 hr tablet, Take 50 mg by mouth., Disp: , Rfl:     metoprolol succinate (TOPROL-XL) 50 MG 24 hr tablet, Take 1 tablet (50 mg total) by mouth once daily. For  high blood pressure, Disp: 30 tablet, Rfl: 1    multivit with minerals/lutein (MULTIVITAMIN 50 PLUS ORAL), multivitamin  q day, Disp: , Rfl:     omega-3 fatty acids fish oil (OMEGA-3 2100) 1,050-1,200 mg Cap capsule, Omega 3  q day, Disp: , Rfl:     PHENYLEPH/MINERAL OIL/PETROLAT (PREPARATION H RECT), Place rectally once daily., Disp: , Rfl:     QUEtiapine (SEROQUEL) 25 MG Tab, Take 25 mg by mouth nightly as needed., Disp: , Rfl: 0    sodium bicarbonate 325 MG tablet, Take 1 tablet (325 mg total) by mouth 4 (four) times daily., Disp: 120 tablet, Rfl: 6    sodium bicarbonate 325 MG tablet, Take 325 mg by mouth., Disp: , Rfl:     SPIRIVA RESPIMAT 2.5 mcg/actuation Mist, , Disp: , Rfl:     zinc (CHELATED ZINC) 50 mg Tab, Take by mouth once daily., Disp: , Rfl:     calcitRIOL (ROCALTROL) 0.25 MCG Cap, Take 1 capsule (0.25 mcg total) by mouth every Monday and Friday., Disp: 30 capsule, Rfl: 3    conjugated estrogens (PREMARIN) vaginal cream, Pea size amount to urethral area 3 nights out of the week. (M-W-F), Disp: 30 g, Rfl: 6    fluconazole (DIFLUCAN) 100 MG tablet, , Disp: , Rfl:     INCRUSE ELLIPTA 62.5 mcg/actuation DsDv, , Disp: , Rfl:     levocetirizine (XYZAL) 5 MG tablet, Take 1 tablet (5 mg total) by mouth every evening. (Patient not taking: Reported on 12/16/2019), Disp: 30 tablet, Rfl: 11    liothyronine (CYTOMEL) 5 MCG Tab, Take 5 mcg by mouth once daily., Disp: , Rfl:     losartan (COZAAR) 50 MG tablet, losartan 50 mg tablet  Take 1 tablet every day by oral route., Disp: , Rfl:     mirabegron (MYRBETRIQ) 25 mg Tb24 ER tablet, Take 1 tablet (25 mg total) by mouth once daily., Disp: 30 tablet, Rfl: 11    NIFEdipine (PROCARDIA-XL) 30 MG (OSM) 24 hr tablet, Take 1 tablet (30 mg total) by mouth once daily., Disp: 30 tablet, Rfl: 11    SYNTHROID 200 mcg tablet, , Disp: , Rfl:     traZODone (DESYREL) 300 MG tablet, TAKE 2 TABLETS BY MOUTH IN THE EVENING (Patient not taking: Reported on  12/16/2019), Disp: 60 tablet, Rfl: 3    triamcinolone acetonide 0.1% (KENALOG) 0.1 % cream, , Disp: , Rfl:     triamcinolone acetonide 0.1% (KENALOG) 0.1 % cream, APPLY TOPICALLY 2 TIMES DAILY (Patient not taking: Reported on 12/16/2019), Disp: 45 g, Rfl: 1    PHYSICAL EXAMINATION:    Constitutional: She appears well-developed and well-nourished.  She is in no apparent distress.    Eyes: No scleral icterus noted bilaterally. No discharge bilaterally.    Nose: No rhinorrhea    Cardiovascular: Normal rate.  No pitting edema noted in lower extremities bilaterally    Pulmonary/Chest: Effort normal. No respiratory distress.     Abdominal:  She exhibits no distension.  There is no CVA tenderness.     Lymphadenopathy:          Right: No supraclavicular adenopathy present.        Left: No supraclavicular adenopathy present.     Neurological: She is alert and oriented to person, place, and time.     Skin: Skin is warm and dry.     Psych: Cooperative with normal affect.    Genitourinary:   Vaginal atr  Normal external female genitalia  Urethral meatus is normal  Consent verbally obtained.  Betadine prep was applied to the urethral meatus. An in and out cath was performed after voiding.  The PVR was 40 ml.    Physical Exam      LABS:    U/a: 1.010, pH 5, negative.      IMPRESSION:    Encounter Diagnoses   Name Primary?    Mixed incontinence Yes    Recurrent UTI     Frequency of urination     Urgency of urination        PLAN:    Urine culture  Urinalysis  Refilled myrbetriq  Refilled estrace.   Patient met with research coordinator, FANNY Rudd today.      Monalisa Rolle PA-C

## 2019-12-19 LAB — BACTERIA UR CULT: ABNORMAL

## 2019-12-19 NOTE — PROGRESS NOTES
Supplement and Effects Probiotic Study  Dr. Melany Anderson  IRB# [id number]      Subject # 12  Date of Visit: December 16, 2019  Week 52 Visit/Final Visit    Once determining subject would like to continue participation in this study, The following procedures/tasks were completed:    1. Subject does still meet all Inclusion criteria and no Exclusion criteria.  2. Subject's vital signs were recorded: b/p 136/ 62, pulse 72, weight 103 kg, height 5 feet 9 inches.  3. Urine POCT test was completed. Test was negative. A catheterized urine specimen was sent to the lab for u/a and culture.   4. Urine pregnancy test was not applicable.   5. An assessment for any AEs/SAEs was performed. Subject has not reported any.  6. Concomitant medications were reviewed with the subject. There are changes to the medication list. Please see medicine list in Epic.  7. A review of any antibiotics used since the last visit was completed. Subject reports no antibiotic use.   8. An assessment of subject's stool was completed using the [facility] Stool Chart. Subject reports type 4.  9. A physical exam, including pelvis exam was performed by JIM Coates.  10. Subject has completed the Visit Questionnaire without assistance.   Patient was thanked for their participation.    Patient was past 52 weeks for this visit but per Dr. Anderson, was asked to return for final visit and conclusion of study.  Patient agreed.

## 2019-12-20 DIAGNOSIS — A49.9 BACTERIAL UTI: Primary | ICD-10-CM

## 2019-12-20 DIAGNOSIS — N39.0 BACTERIAL UTI: Primary | ICD-10-CM

## 2019-12-20 RX ORDER — AMOXICILLIN AND CLAVULANATE POTASSIUM 875; 125 MG/1; MG/1
1 TABLET, FILM COATED ORAL 2 TIMES DAILY
Qty: 14 TABLET | Refills: 0 | Status: SHIPPED | OUTPATIENT
Start: 2019-12-20 | End: 2019-12-27

## 2020-01-06 DIAGNOSIS — N17.9 AKI (ACUTE KIDNEY INJURY): ICD-10-CM

## 2020-01-09 RX ORDER — METOPROLOL SUCCINATE 50 MG/1
TABLET, EXTENDED RELEASE ORAL
Refills: 0 | OUTPATIENT
Start: 2020-01-09

## 2020-01-16 ENCOUNTER — PES CALL (OUTPATIENT)
Dept: ADMINISTRATIVE | Facility: CLINIC | Age: 75
End: 2020-01-16

## 2020-01-22 ENCOUNTER — TELEPHONE (OUTPATIENT)
Dept: RESEARCH | Facility: HOSPITAL | Age: 75
End: 2020-01-22

## 2020-03-02 ENCOUNTER — LAB VISIT (OUTPATIENT)
Dept: LAB | Facility: HOSPITAL | Age: 75
End: 2020-03-02
Attending: INTERNAL MEDICINE
Payer: MEDICARE

## 2020-03-02 DIAGNOSIS — N18.30 CHRONIC KIDNEY DISEASE, STAGE III (MODERATE): ICD-10-CM

## 2020-03-02 DIAGNOSIS — N25.81 SECONDARY HYPERPARATHYROIDISM OF RENAL ORIGIN: ICD-10-CM

## 2020-03-02 DIAGNOSIS — Z85.850 PERSONAL HISTORY OF MALIGNANT NEOPLASM OF THYROID: ICD-10-CM

## 2020-03-02 DIAGNOSIS — I25.5 ISCHEMIC DILATED CARDIOMYOPATHY: ICD-10-CM

## 2020-03-02 DIAGNOSIS — I47.10 SUPRAVENTRICULAR TACHYCARDIA: ICD-10-CM

## 2020-03-02 DIAGNOSIS — E55.9 VITAMIN D DEFICIENCY: ICD-10-CM

## 2020-03-02 DIAGNOSIS — I12.9 BENIGN HYPERTENSIVE RENAL DISEASE: Primary | ICD-10-CM

## 2020-03-02 DIAGNOSIS — E21.3 HYPERPARATHYROIDISM: ICD-10-CM

## 2020-03-02 DIAGNOSIS — E03.9 HYPOTHYROIDISM: ICD-10-CM

## 2020-03-02 DIAGNOSIS — M81.0 OSTEOPOROSIS: ICD-10-CM

## 2020-03-02 DIAGNOSIS — I42.0 ISCHEMIC DILATED CARDIOMYOPATHY: ICD-10-CM

## 2020-03-02 LAB
ANION GAP SERPL CALC-SCNC: 9 MMOL/L (ref 8–16)
BASOPHILS # BLD AUTO: 0.02 K/UL (ref 0–0.2)
BASOPHILS NFR BLD: 0.4 % (ref 0–1.9)
BUN SERPL-MCNC: 12 MG/DL (ref 8–23)
CALCIUM SERPL-MCNC: 8.5 MG/DL (ref 8.7–10.5)
CHLORIDE SERPL-SCNC: 113 MMOL/L (ref 95–110)
CO2 SERPL-SCNC: 21 MMOL/L (ref 23–29)
CREAT SERPL-MCNC: 1.5 MG/DL (ref 0.5–1.4)
DIFFERENTIAL METHOD: ABNORMAL
EOSINOPHIL # BLD AUTO: 0.2 K/UL (ref 0–0.5)
EOSINOPHIL NFR BLD: 3.3 % (ref 0–8)
ERYTHROCYTE [DISTWIDTH] IN BLOOD BY AUTOMATED COUNT: 15 % (ref 11.5–14.5)
EST. GFR  (AFRICAN AMERICAN): 39.3 ML/MIN/1.73 M^2
EST. GFR  (NON AFRICAN AMERICAN): 34.1 ML/MIN/1.73 M^2
GLUCOSE SERPL-MCNC: 96 MG/DL (ref 70–110)
HCT VFR BLD AUTO: 37.3 % (ref 37–48.5)
HGB BLD-MCNC: 11 G/DL (ref 12–16)
IMM GRANULOCYTES # BLD AUTO: 0.01 K/UL (ref 0–0.04)
IMM GRANULOCYTES NFR BLD AUTO: 0.2 % (ref 0–0.5)
LYMPHOCYTES # BLD AUTO: 1.5 K/UL (ref 1–4.8)
LYMPHOCYTES NFR BLD: 26.8 % (ref 18–48)
MCH RBC QN AUTO: 29.2 PG (ref 27–31)
MCHC RBC AUTO-ENTMCNC: 29.5 G/DL (ref 32–36)
MCV RBC AUTO: 99 FL (ref 82–98)
MONOCYTES # BLD AUTO: 0.5 K/UL (ref 0.3–1)
MONOCYTES NFR BLD: 8.2 % (ref 4–15)
NEUTROPHILS # BLD AUTO: 3.5 K/UL (ref 1.8–7.7)
NEUTROPHILS NFR BLD: 61.1 % (ref 38–73)
NRBC BLD-RTO: 0 /100 WBC
PLATELET # BLD AUTO: 232 K/UL (ref 150–350)
PMV BLD AUTO: 11.3 FL (ref 9.2–12.9)
POTASSIUM SERPL-SCNC: 4.1 MMOL/L (ref 3.5–5.1)
RBC # BLD AUTO: 3.77 M/UL (ref 4–5.4)
SODIUM SERPL-SCNC: 143 MMOL/L (ref 136–145)
WBC # BLD AUTO: 5.7 K/UL (ref 3.9–12.7)

## 2020-03-02 PROCEDURE — 85025 COMPLETE CBC W/AUTO DIFF WBC: CPT

## 2020-03-02 PROCEDURE — 80048 BASIC METABOLIC PNL TOTAL CA: CPT

## 2020-03-02 PROCEDURE — 36415 COLL VENOUS BLD VENIPUNCTURE: CPT | Mod: PO

## 2020-03-03 ENCOUNTER — LAB VISIT (OUTPATIENT)
Dept: LAB | Facility: HOSPITAL | Age: 75
End: 2020-03-03
Attending: INTERNAL MEDICINE
Payer: MEDICARE

## 2020-03-03 DIAGNOSIS — M81.0 OSTEOPOROSIS: ICD-10-CM

## 2020-03-03 DIAGNOSIS — E03.9 HYPOTHYROIDISM: ICD-10-CM

## 2020-03-03 DIAGNOSIS — Z85.850 PERSONAL HISTORY OF MALIGNANT NEOPLASM OF THYROID: ICD-10-CM

## 2020-03-03 DIAGNOSIS — I47.10 SUPRAVENTRICULAR TACHYCARDIA: ICD-10-CM

## 2020-03-03 DIAGNOSIS — E21.3 HYPERPARATHYROIDISM: ICD-10-CM

## 2020-03-03 DIAGNOSIS — E55.9 VITAMIN D DEFICIENCY: ICD-10-CM

## 2020-03-03 DIAGNOSIS — I12.9 BENIGN HYPERTENSIVE RENAL DISEASE: ICD-10-CM

## 2020-03-03 DIAGNOSIS — N18.30 CHRONIC KIDNEY DISEASE, STAGE III (MODERATE): ICD-10-CM

## 2020-03-03 DIAGNOSIS — I25.5 ISCHEMIC DILATED CARDIOMYOPATHY: ICD-10-CM

## 2020-03-03 DIAGNOSIS — I42.0 ISCHEMIC DILATED CARDIOMYOPATHY: ICD-10-CM

## 2020-03-03 DIAGNOSIS — N25.81 SECONDARY HYPERPARATHYROIDISM OF RENAL ORIGIN: ICD-10-CM

## 2020-03-03 LAB
CREAT UR-MCNC: 90 MG/DL (ref 15–325)
PROT UR-MCNC: 19 MG/DL (ref 0–15)
PROT/CREAT UR: 0.21 MG/G{CREAT} (ref 0–0.2)

## 2020-03-03 PROCEDURE — 84156 ASSAY OF PROTEIN URINE: CPT

## 2020-04-14 DIAGNOSIS — N32.81 OVERACTIVE BLADDER: Primary | ICD-10-CM

## 2020-04-15 RX ORDER — OXYBUTYNIN CHLORIDE 15 MG/1
TABLET, EXTENDED RELEASE ORAL
Qty: 90 TABLET | Refills: 0 | Status: SHIPPED | OUTPATIENT
Start: 2020-04-15

## 2020-05-19 ENCOUNTER — LAB VISIT (OUTPATIENT)
Dept: LAB | Facility: HOSPITAL | Age: 75
End: 2020-05-19
Attending: INTERNAL MEDICINE
Payer: MEDICARE

## 2020-05-19 DIAGNOSIS — I42.0 CONGESTIVE CARDIOMYOPATHY: ICD-10-CM

## 2020-05-19 DIAGNOSIS — M81.0 OSTEOPOROSIS: ICD-10-CM

## 2020-05-19 DIAGNOSIS — E03.9 HYPOTHYROIDISM: ICD-10-CM

## 2020-05-19 DIAGNOSIS — E21.3 HYPERPARATHYROIDISM, UNSPECIFIED: ICD-10-CM

## 2020-05-19 DIAGNOSIS — I12.9 HYPERTENSIVE RENAL DISEASE, BENIGN: Primary | ICD-10-CM

## 2020-05-19 DIAGNOSIS — I25.5 ISCHEMIC CARDIOMYOPATHY: ICD-10-CM

## 2020-05-19 DIAGNOSIS — E55.9 VITAMIN D DEFICIENCY, UNSPECIFIED: ICD-10-CM

## 2020-05-19 DIAGNOSIS — N18.30 CHRONIC KIDNEY DISEASE, STAGE III (MODERATE): ICD-10-CM

## 2020-05-19 DIAGNOSIS — N25.81 SECONDARY HYPERPARATHYROIDISM OF RENAL ORIGIN: ICD-10-CM

## 2020-05-19 DIAGNOSIS — Z85.850 PERSONAL HISTORY OF MALIGNANT NEOPLASM OF THYROID: ICD-10-CM

## 2020-05-19 DIAGNOSIS — I47.10 SVT (SUPRAVENTRICULAR TACHYCARDIA): ICD-10-CM

## 2020-05-19 LAB
ANION GAP SERPL CALC-SCNC: 7 MMOL/L (ref 8–16)
BACTERIA #/AREA URNS AUTO: ABNORMAL /HPF
BASOPHILS # BLD AUTO: 0.03 K/UL (ref 0–0.2)
BASOPHILS NFR BLD: 0.4 % (ref 0–1.9)
BILIRUB UR QL STRIP: NEGATIVE
BUN SERPL-MCNC: 14 MG/DL (ref 8–23)
CALCIUM SERPL-MCNC: 7.8 MG/DL (ref 8.7–10.5)
CHLORIDE SERPL-SCNC: 109 MMOL/L (ref 95–110)
CLARITY UR REFRACT.AUTO: ABNORMAL
CO2 SERPL-SCNC: 24 MMOL/L (ref 23–29)
COLOR UR AUTO: YELLOW
CREAT SERPL-MCNC: 1.5 MG/DL (ref 0.5–1.4)
CREAT UR-MCNC: 76 MG/DL (ref 15–325)
DIFFERENTIAL METHOD: ABNORMAL
EOSINOPHIL # BLD AUTO: 0.2 K/UL (ref 0–0.5)
EOSINOPHIL NFR BLD: 2.4 % (ref 0–8)
ERYTHROCYTE [DISTWIDTH] IN BLOOD BY AUTOMATED COUNT: 14.2 % (ref 11.5–14.5)
EST. GFR  (AFRICAN AMERICAN): 39.3 ML/MIN/1.73 M^2
EST. GFR  (NON AFRICAN AMERICAN): 34.1 ML/MIN/1.73 M^2
GLUCOSE SERPL-MCNC: 89 MG/DL (ref 70–110)
GLUCOSE UR QL STRIP: NEGATIVE
HCT VFR BLD AUTO: 35.8 % (ref 37–48.5)
HGB BLD-MCNC: 10.7 G/DL (ref 12–16)
HGB UR QL STRIP: ABNORMAL
IMM GRANULOCYTES # BLD AUTO: 0.02 K/UL (ref 0–0.04)
IMM GRANULOCYTES NFR BLD AUTO: 0.3 % (ref 0–0.5)
KETONES UR QL STRIP: NEGATIVE
LEUKOCYTE ESTERASE UR QL STRIP: NEGATIVE
LYMPHOCYTES # BLD AUTO: 1.3 K/UL (ref 1–4.8)
LYMPHOCYTES NFR BLD: 18.3 % (ref 18–48)
MCH RBC QN AUTO: 30 PG (ref 27–31)
MCHC RBC AUTO-ENTMCNC: 29.9 G/DL (ref 32–36)
MCV RBC AUTO: 100 FL (ref 82–98)
MICROSCOPIC COMMENT: ABNORMAL
MONOCYTES # BLD AUTO: 0.7 K/UL (ref 0.3–1)
MONOCYTES NFR BLD: 9.1 % (ref 4–15)
NEUTROPHILS # BLD AUTO: 5 K/UL (ref 1.8–7.7)
NEUTROPHILS NFR BLD: 69.5 % (ref 38–73)
NITRITE UR QL STRIP: POSITIVE
NRBC BLD-RTO: 0 /100 WBC
PH UR STRIP: 5 [PH] (ref 5–8)
PLATELET # BLD AUTO: 264 K/UL (ref 150–350)
PMV BLD AUTO: 11.5 FL (ref 9.2–12.9)
POTASSIUM SERPL-SCNC: 4.1 MMOL/L (ref 3.5–5.1)
PROT UR QL STRIP: NEGATIVE
PROT UR-MCNC: 9 MG/DL (ref 0–15)
PROT/CREAT UR: 0.12 MG/G{CREAT} (ref 0–0.2)
RBC # BLD AUTO: 3.57 M/UL (ref 4–5.4)
RBC #/AREA URNS AUTO: 2 /HPF (ref 0–4)
SODIUM SERPL-SCNC: 140 MMOL/L (ref 136–145)
SP GR UR STRIP: 1.01 (ref 1–1.03)
SQUAMOUS #/AREA URNS AUTO: 1 /HPF
URN SPEC COLLECT METH UR: ABNORMAL
WBC # BLD AUTO: 7.14 K/UL (ref 3.9–12.7)
WBC #/AREA URNS AUTO: 6 /HPF (ref 0–5)

## 2020-05-19 PROCEDURE — 85025 COMPLETE CBC W/AUTO DIFF WBC: CPT

## 2020-05-19 PROCEDURE — 81001 URINALYSIS AUTO W/SCOPE: CPT

## 2020-05-19 PROCEDURE — 80048 BASIC METABOLIC PNL TOTAL CA: CPT

## 2020-05-19 PROCEDURE — 84156 ASSAY OF PROTEIN URINE: CPT

## 2020-05-19 PROCEDURE — 36415 COLL VENOUS BLD VENIPUNCTURE: CPT | Mod: PO

## 2021-08-04 ENCOUNTER — LAB VISIT (OUTPATIENT)
Dept: LAB | Facility: OTHER | Age: 76
End: 2021-08-04
Payer: MEDICARE

## 2021-08-04 DIAGNOSIS — Z20.822 ENCOUNTER FOR LABORATORY TESTING FOR COVID-19 VIRUS: ICD-10-CM

## 2021-08-04 PROCEDURE — U0003 INFECTIOUS AGENT DETECTION BY NUCLEIC ACID (DNA OR RNA); SEVERE ACUTE RESPIRATORY SYNDROME CORONAVIRUS 2 (SARS-COV-2) (CORONAVIRUS DISEASE [COVID-19]), AMPLIFIED PROBE TECHNIQUE, MAKING USE OF HIGH THROUGHPUT TECHNOLOGIES AS DESCRIBED BY CMS-2020-01-R: HCPCS | Performed by: NURSE PRACTITIONER

## 2021-08-05 LAB
SARS-COV-2 RNA RESP QL NAA+PROBE: NORMAL
TEST PERFORMANCE INFO SPEC: NORMAL

## 2021-08-11 ENCOUNTER — LAB VISIT (OUTPATIENT)
Dept: LAB | Facility: OTHER | Age: 76
End: 2021-08-11
Payer: MEDICARE

## 2021-08-11 DIAGNOSIS — Z20.822 ENCOUNTER FOR LABORATORY TESTING FOR COVID-19 VIRUS: ICD-10-CM

## 2021-08-11 PROCEDURE — U0003 INFECTIOUS AGENT DETECTION BY NUCLEIC ACID (DNA OR RNA); SEVERE ACUTE RESPIRATORY SYNDROME CORONAVIRUS 2 (SARS-COV-2) (CORONAVIRUS DISEASE [COVID-19]), AMPLIFIED PROBE TECHNIQUE, MAKING USE OF HIGH THROUGHPUT TECHNOLOGIES AS DESCRIBED BY CMS-2020-01-R: HCPCS | Performed by: NURSE PRACTITIONER

## 2021-08-12 LAB
SARS-COV-2 RNA RESP QL NAA+PROBE: NOT DETECTED
SARS-COV-2- CYCLE NUMBER: -1

## 2021-08-18 ENCOUNTER — LAB VISIT (OUTPATIENT)
Dept: LAB | Facility: OTHER | Age: 76
End: 2021-08-18
Payer: COMMERCIAL

## 2021-08-18 DIAGNOSIS — Z20.822 ENCOUNTER FOR LABORATORY TESTING FOR COVID-19 VIRUS: ICD-10-CM

## 2021-08-18 PROCEDURE — U0003 INFECTIOUS AGENT DETECTION BY NUCLEIC ACID (DNA OR RNA); SEVERE ACUTE RESPIRATORY SYNDROME CORONAVIRUS 2 (SARS-COV-2) (CORONAVIRUS DISEASE [COVID-19]), AMPLIFIED PROBE TECHNIQUE, MAKING USE OF HIGH THROUGHPUT TECHNOLOGIES AS DESCRIBED BY CMS-2020-01-R: HCPCS | Performed by: NURSE PRACTITIONER

## 2021-08-20 LAB
SARS-COV-2 RNA RESP QL NAA+PROBE: NORMAL
TEST PERFORMANCE INFO SPEC: NORMAL

## 2022-06-04 ENCOUNTER — TELEPHONE ENCOUNTER (OUTPATIENT)
Dept: URBAN - METROPOLITAN AREA CLINIC 68 | Facility: CLINIC | Age: 77
End: 2022-06-04

## 2022-06-05 ENCOUNTER — TELEPHONE ENCOUNTER (OUTPATIENT)
Dept: URBAN - METROPOLITAN AREA CLINIC 68 | Facility: CLINIC | Age: 77
End: 2022-06-05

## 2022-06-25 ENCOUNTER — TELEPHONE ENCOUNTER (OUTPATIENT)
Age: 77
End: 2022-06-25

## 2022-06-26 ENCOUNTER — TELEPHONE ENCOUNTER (OUTPATIENT)
Age: 77
End: 2022-06-26